# Patient Record
Sex: MALE | Race: WHITE | NOT HISPANIC OR LATINO | Employment: OTHER | URBAN - NONMETROPOLITAN AREA
[De-identification: names, ages, dates, MRNs, and addresses within clinical notes are randomized per-mention and may not be internally consistent; named-entity substitution may affect disease eponyms.]

---

## 2017-01-20 DIAGNOSIS — E11.9 TYPE 2 DIABETES MELLITUS WITHOUT COMPLICATIONS (HCC): ICD-10-CM

## 2017-02-09 ENCOUNTER — OFFICE VISIT (OUTPATIENT)
Dept: FAMILY MEDICINE CLINIC | Facility: CLINIC | Age: 72
End: 2017-02-09

## 2017-02-09 VITALS
OXYGEN SATURATION: 97 % | HEART RATE: 87 BPM | TEMPERATURE: 98.6 F | RESPIRATION RATE: 12 BRPM | WEIGHT: 143 LBS | DIASTOLIC BLOOD PRESSURE: 80 MMHG | HEIGHT: 68 IN | BODY MASS INDEX: 21.67 KG/M2 | SYSTOLIC BLOOD PRESSURE: 160 MMHG

## 2017-02-09 DIAGNOSIS — M54.59 MECHANICAL LOW BACK PAIN: ICD-10-CM

## 2017-02-09 DIAGNOSIS — F17.200 CURRENT SMOKER: ICD-10-CM

## 2017-02-09 DIAGNOSIS — Z00.00 HEALTHCARE MAINTENANCE: ICD-10-CM

## 2017-02-09 DIAGNOSIS — E29.1 ANDROGEN DEFICIENCY: ICD-10-CM

## 2017-02-09 DIAGNOSIS — I10 ESSENTIAL HYPERTENSION: ICD-10-CM

## 2017-02-09 DIAGNOSIS — E78.2 MIXED HYPERLIPIDEMIA: ICD-10-CM

## 2017-02-09 DIAGNOSIS — E78.49 OTHER HYPERLIPIDEMIA: ICD-10-CM

## 2017-02-09 DIAGNOSIS — E11.9 TYPE 2 DIABETES MELLITUS WITHOUT COMPLICATION, WITHOUT LONG-TERM CURRENT USE OF INSULIN (HCC): ICD-10-CM

## 2017-02-09 DIAGNOSIS — I70.0 ATHEROSCLEROSIS OF AORTA (HCC): Primary | ICD-10-CM

## 2017-02-09 DIAGNOSIS — K21.9 GASTROESOPHAGEAL REFLUX DISEASE WITHOUT ESOPHAGITIS: ICD-10-CM

## 2017-02-09 DIAGNOSIS — F41.8 ANXIETY ASSOCIATED WITH DEPRESSION: ICD-10-CM

## 2017-02-09 LAB
ALBUMIN SERPL-MCNC: 4.5 G/DL (ref 3.4–4.8)
ALBUMIN UR-MCNC: 3.5 MG/L
ALBUMIN/GLOB SERPL: 1.8 G/DL (ref 1.5–2.5)
ALP SERPL-CCNC: 57 U/L (ref 46–116)
ALT SERPL W P-5'-P-CCNC: 17 U/L (ref 10–44)
ANION GAP SERPL CALCULATED.3IONS-SCNC: 2.9 MMOL/L (ref 3.6–11.2)
AST SERPL-CCNC: 24 U/L (ref 10–34)
BASOPHILS # BLD AUTO: 0.02 10*3/MM3 (ref 0–0.3)
BASOPHILS NFR BLD AUTO: 0.3 % (ref 0–2)
BILIRUB SERPL-MCNC: 0.5 MG/DL (ref 0.2–1.8)
BUN BLD-MCNC: 11 MG/DL (ref 7–21)
BUN/CREAT SERPL: 14.1 (ref 7–25)
CALCIUM SPEC-SCNC: 9.6 MG/DL (ref 7.7–10)
CHLORIDE SERPL-SCNC: 108 MMOL/L (ref 99–112)
CHOLEST SERPL-MCNC: 146 MG/DL (ref 0–200)
CO2 SERPL-SCNC: 31.1 MMOL/L (ref 24.3–31.9)
CREAT BLD-MCNC: 0.78 MG/DL (ref 0.43–1.29)
DEPRECATED RDW RBC AUTO: 45.4 FL (ref 37–54)
EOSINOPHIL # BLD AUTO: 0.07 10*3/MM3 (ref 0–0.7)
EOSINOPHIL NFR BLD AUTO: 1 % (ref 0–7)
ERYTHROCYTE [DISTWIDTH] IN BLOOD BY AUTOMATED COUNT: 14.2 % (ref 11.5–14.5)
GFR SERPL CREATININE-BSD FRML MDRD: 99 ML/MIN/1.73
GLOBULIN UR ELPH-MCNC: 2.5 GM/DL
GLUCOSE BLD-MCNC: 155 MG/DL (ref 70–110)
HBA1C MFR BLD: 8.3 % (ref 4.5–5.7)
HCT VFR BLD AUTO: 45.2 % (ref 42–52)
HDLC SERPL-MCNC: 61 MG/DL (ref 60–100)
HGB BLD-MCNC: 14.8 G/DL (ref 14–18)
IMM GRANULOCYTES # BLD: 0.01 10*3/MM3 (ref 0–0.03)
IMM GRANULOCYTES NFR BLD: 0.1 % (ref 0–0.5)
LDLC SERPL CALC-MCNC: 73 MG/DL (ref 0–100)
LDLC/HDLC SERPL: 1.2 {RATIO}
LYMPHOCYTES # BLD AUTO: 2.17 10*3/MM3 (ref 1–3)
LYMPHOCYTES NFR BLD AUTO: 29.7 % (ref 16–46)
MCH RBC QN AUTO: 29.1 PG (ref 27–33)
MCHC RBC AUTO-ENTMCNC: 32.7 G/DL (ref 33–37)
MCV RBC AUTO: 88.8 FL (ref 80–94)
MONOCYTES # BLD AUTO: 0.44 10*3/MM3 (ref 0.1–0.9)
MONOCYTES NFR BLD AUTO: 6 % (ref 0–12)
NEUTROPHILS # BLD AUTO: 4.59 10*3/MM3 (ref 1.4–6.5)
NEUTROPHILS NFR BLD AUTO: 62.9 % (ref 40–75)
OSMOLALITY SERPL CALC.SUM OF ELEC: 285.7 MOSM/KG (ref 273–305)
PLATELET # BLD AUTO: 204 10*3/MM3 (ref 130–400)
PMV BLD AUTO: 10.3 FL (ref 6–10)
POTASSIUM BLD-SCNC: 5 MMOL/L (ref 3.5–5.3)
PROT SERPL-MCNC: 7 G/DL (ref 6–8)
RBC # BLD AUTO: 5.09 10*6/MM3 (ref 4.7–6.1)
SODIUM BLD-SCNC: 142 MMOL/L (ref 135–153)
TRIGL SERPL-MCNC: 59 MG/DL (ref 0–150)
VLDLC SERPL-MCNC: 11.8 MG/DL
WBC NRBC COR # BLD: 7.3 10*3/MM3 (ref 4.5–12.5)

## 2017-02-09 PROCEDURE — 85025 COMPLETE CBC W/AUTO DIFF WBC: CPT | Performed by: GENERAL PRACTICE

## 2017-02-09 PROCEDURE — 80061 LIPID PANEL: CPT | Performed by: GENERAL PRACTICE

## 2017-02-09 PROCEDURE — 80053 COMPREHEN METABOLIC PANEL: CPT | Performed by: GENERAL PRACTICE

## 2017-02-09 PROCEDURE — 82043 UR ALBUMIN QUANTITATIVE: CPT | Performed by: GENERAL PRACTICE

## 2017-02-09 PROCEDURE — 99214 OFFICE O/P EST MOD 30 MIN: CPT | Performed by: GENERAL PRACTICE

## 2017-02-09 PROCEDURE — 83036 HEMOGLOBIN GLYCOSYLATED A1C: CPT | Performed by: GENERAL PRACTICE

## 2017-02-09 PROCEDURE — 36415 COLL VENOUS BLD VENIPUNCTURE: CPT | Performed by: GENERAL PRACTICE

## 2017-02-09 RX ORDER — TRAMADOL HYDROCHLORIDE 50 MG/1
50 TABLET ORAL EVERY 6 HOURS PRN
Qty: 360 TABLET | Refills: 1 | Status: SHIPPED | OUTPATIENT
Start: 2017-02-09 | End: 2017-08-14 | Stop reason: SDUPTHER

## 2017-02-09 RX ORDER — LANSOPRAZOLE 30 MG/1
30 CAPSULE, DELAYED RELEASE ORAL DAILY
Qty: 90 CAPSULE | Refills: 3 | Status: SHIPPED | OUTPATIENT
Start: 2017-02-09 | End: 2018-02-12 | Stop reason: SDUPTHER

## 2017-02-09 RX ORDER — SIMVASTATIN 20 MG
20 TABLET ORAL NIGHTLY
Qty: 90 TABLET | Refills: 3 | Status: SHIPPED | OUTPATIENT
Start: 2017-02-09 | End: 2017-09-11 | Stop reason: SDUPTHER

## 2017-02-09 RX ORDER — MELOXICAM 15 MG/1
15 TABLET ORAL DAILY
Qty: 90 TABLET | Refills: 3 | Status: SHIPPED | OUTPATIENT
Start: 2017-02-09 | End: 2017-09-11 | Stop reason: SDUPTHER

## 2017-02-09 RX ORDER — DULOXETIN HYDROCHLORIDE 60 MG/1
120 CAPSULE, DELAYED RELEASE ORAL DAILY
Qty: 180 CAPSULE | Refills: 3 | Status: SHIPPED | OUTPATIENT
Start: 2017-02-09 | End: 2018-08-06 | Stop reason: SDUPTHER

## 2017-02-09 RX ORDER — EZETIMIBE 10 MG/1
10 TABLET ORAL DAILY
Qty: 90 TABLET | Refills: 3 | Status: SHIPPED | OUTPATIENT
Start: 2017-02-09 | End: 2017-09-11 | Stop reason: SDUPTHER

## 2017-02-09 NOTE — PROGRESS NOTES
Subjective   Torin Granados is a 71 y.o. male.     History of Present Illness     Smoking  Since last here been using nicotine gum and has been able to reduce his smoking from 2 packs per day to 2 cigarettes a day!    Diabetes  Current symptoms include none. Patient denies paresthesia of the feet, visual disturbances, polydipsia and polyuria. Evaluation to date has been: fasting blood sugar and hemoglobin A1C. Home sugars: BGs are running  consistent with Hgb A1C. Current treatments: metformin and CLAY - glipizide. Last dilated eye exam within one year.  He has not had any recent lab work     Dyslipidemia  Compliance with treatment has been excellent. The patient exercises daily. He is currently being prescribed the following medication for his dyslipidemia - simvastatin, and ezetimibe. Patient denies side effects associated with his medications.     Hypertension  Home blood pressure readings: not doing. Associated signs and symptoms: none. Patient denies: chest pain, palpitations, dyspnea, orthopnea, paroxysmal nocturnal dyspnea and peripheral edema. Current antihypertensive medications includes lisinopril. Medication compliance: taking as prescribed.     Back Pain  The patient has had chronic back pain. Symptoms have been present for a number of  years and are unchanged.  Onset was related to / precipitated by a remote injury. The pain is located in the bilateral lumbar area and does not radiate. The pain is described as aching and stiffness and occurs intermittently. He is currently in no pain. Symptoms are exacerbated by nothing in particular. Symptoms are improved by gentle exercise/stretches, heat, NSAIDs and tramadol. He has also tried TENS unit, PT treatment and pain management treatment. He has no other symptoms associated with the back pain. He denies any weakness in the right leg, weakness in the left leg, tingling in the right leg, tingling in the left leg, change in bladder function and change in bowel  function    The following portions of the patient's history were reviewed and updated as appropriate: allergies, current medications, past medical history, past social history and problem list.    Review of Systems   Constitutional: Negative for appetite change, chills, fatigue, fever and unexpected weight change.   HENT: Negative for congestion, ear pain, rhinorrhea, sneezing, sore throat and voice change.    Eyes: Negative for visual disturbance.   Respiratory: Negative for cough, shortness of breath and wheezing.    Cardiovascular: Negative for chest pain, palpitations and leg swelling.   Gastrointestinal: Negative for abdominal pain, blood in stool, constipation, diarrhea, nausea and vomiting.   Endocrine: Negative for polydipsia and polyuria.   Genitourinary: Negative for difficulty urinating, dysuria, frequency, hematuria and urgency.   Musculoskeletal: Positive for arthralgias and back pain. Negative for joint swelling, myalgias and neck pain.   Skin: Negative for color change.   Neurological: Negative for tremors, weakness, numbness and headaches.   Psychiatric/Behavioral: Negative for dysphoric mood, sleep disturbance and suicidal ideas. The patient is not nervous/anxious.      Objective   Physical Exam   Constitutional: He is oriented to person, place, and time. He appears well-developed and well-nourished. He is cooperative. He does not have a sickly appearance. No distress.   HENT:   Head: Atraumatic.   Right Ear: Tympanic membrane, external ear and ear canal normal.   Left Ear: Tympanic membrane, external ear and ear canal normal.   Mouth/Throat: Oropharynx is clear and moist.   Eyes: EOM are normal. Pupils are equal, round, and reactive to light. No scleral icterus.   Neck: No JVD present. Carotid bruit is not present. No tracheal deviation present. No thyromegaly present.   Cardiovascular: Normal rate, regular rhythm, S1 normal, S2 normal, normal heart sounds and intact distal pulses.  Exam reveals no  gallop.    No murmur heard.  Pulmonary/Chest: Breath sounds normal. He has no wheezes. He has no rales.   Abdominal: Soft. Normal aorta and bowel sounds are normal. He exhibits no abdominal bruit and no mass. There is no hepatosplenomegaly. There is no tenderness. No hernia.   Musculoskeletal: He exhibits no deformity.        Lumbar back: He exhibits tenderness (mild bilateral lumbar paraspinal muscle tenderness). He exhibits normal range of motion, no bony tenderness, no edema and no deformity.   Negative straight leg raise   Lymphadenopathy:        Head (right side): No submandibular adenopathy present.        Head (left side): No submandibular adenopathy present.     He has no cervical adenopathy.   Neurological: He is alert and oriented to person, place, and time. He has normal strength and normal reflexes. He displays normal reflexes. No cranial nerve deficit. He exhibits normal muscle tone. Coordination normal.   Skin: Skin is warm and dry. No rash noted. He is not diaphoretic. No pallor. Nails show no clubbing.   Psychiatric: He has a normal mood and affect. His behavior is normal.     Assessment/Plan   Problems Addressed this Visit        Cardiovascular and Mediastinum    Atherosclerosis of aorta - Primary    Essential hypertension  Hypertension: elevated today. Evidence of target organ damage: peripheral artery disease.  Encouraged to continue to work on diet and exercise plan.   Continue current medication  Updated lab work drawn   We'll consider modifying his antihypertensive regimen if his blood pressure remains elevated at his return     Relevant Orders    CBC & Differential (Completed)    Comprehensive Metabolic Panel (Completed)    CBC Auto Differential (Completed)    Osmolality, Calculated (Completed)    Mixed hyperlipidemia  As above. Continue current medication.    Relevant Medications    simvastatin (ZOCOR) 20 MG tablet    ezetimibe (ZETIA) 10 MG tablet       Digestive    Gastroesophageal reflux  disease without esophagitis    Relevant Medications    lansoprazole (PREVACID) 30 MG capsule       Endocrine    Type 2 diabetes mellitus without complication, without long-term current use of insulin  Diabetes mellitus Type II, under fair control.   Encouraged to continue to pursue ADA diet  Encouraged aerobic exercise.  Glimepiride will be replaced once more with a trial of Farxiga     Relevant Medications    metFORMIN (GLUCOPHAGE) 1000 MG tablet    dapagliflozin (FARXIGA) 5 MG tablet tablet    Other Relevant Orders    Hemoglobin A1c (Completed)    MicroAlbumin, Urine, Random (Completed)    Androgen deficiency       Nervous and Auditory    Mechanical low back pain  Reminded regarding symptomatic treatment  Will continue current treatment       Relevant Medications    traMADol (ULTRAM) 50 MG tablet    meloxicam (MOBIC) 15 MG tablet       Other    Anxiety associated with depression    Relevant Medications    DULoxetine (CYMBALTA) 60 MG capsule    Current smoker  Doing well.  Encouraged to quit altogether     Healthcare maintenance  Patient was unable to follow through with his low-dose CT of the chest.  This will be rescheduled and he would also like an updated screening colonoscopy arranged     Relevant Orders    Ambulatory Referral to Gastroenterology

## 2017-03-01 ENCOUNTER — HOSPITAL ENCOUNTER (OUTPATIENT)
Dept: CT IMAGING | Facility: HOSPITAL | Age: 72
Discharge: HOME | End: 2017-03-01
Admitting: GENERAL PRACTICE

## 2017-03-01 DIAGNOSIS — Z00.00 HEALTHCARE MAINTENANCE: ICD-10-CM

## 2017-03-01 DIAGNOSIS — Z87.891 PERSONAL HISTORY OF NICOTINE DEPENDENCE: ICD-10-CM

## 2017-03-01 DIAGNOSIS — F17.200 CURRENT SMOKER: ICD-10-CM

## 2017-03-01 PROCEDURE — G0297 LDCT FOR LUNG CA SCREEN: HCPCS

## 2017-03-01 PROCEDURE — 71250 CT THORAX DX C-: CPT | Performed by: RADIOLOGY

## 2017-03-13 ENCOUNTER — CONSULT (OUTPATIENT)
Dept: GASTROENTEROLOGY | Facility: CLINIC | Age: 72
End: 2017-03-13

## 2017-03-13 VITALS
OXYGEN SATURATION: 97 % | BODY MASS INDEX: 21.58 KG/M2 | SYSTOLIC BLOOD PRESSURE: 167 MMHG | DIASTOLIC BLOOD PRESSURE: 87 MMHG | WEIGHT: 142.4 LBS | HEIGHT: 68 IN | HEART RATE: 80 BPM

## 2017-03-13 DIAGNOSIS — Z12.11 ENCOUNTER FOR SCREENING FOR MALIGNANT NEOPLASM OF COLON: Primary | ICD-10-CM

## 2017-03-13 DIAGNOSIS — Z86.010 HISTORY OF COLONIC POLYPS: ICD-10-CM

## 2017-03-13 PROBLEM — Z86.0100 HISTORY OF COLONIC POLYPS: Status: ACTIVE | Noted: 2017-03-13

## 2017-03-13 PROCEDURE — 99202 OFFICE O/P NEW SF 15 MIN: CPT | Performed by: PHYSICIAN ASSISTANT

## 2017-03-13 NOTE — PROGRESS NOTES
Chief Complaint   Patient presents with   • Colon Cancer Screening     history of polyps     Torin Granados is a 71 y.o. male who presents to the office today at the request of Dr. Jan Dudley for Colon Cancer Screening (history of polyps).    HPI  The patient is here to discuss a screening colonoscopy.  Fifteen years ago he had colon polyps removed.  His last colonoscopy was in [place] 8 years ago.  Patient has a history of hemorrhoids.  He denies nausea, vomiting, abdominal pain, constipation, diarrhea, hematochezia, and melena.  Medical, surgical, family, and social histories were reviewed and are listed below.        Review of Systems   Constitutional: Negative.    HENT: Positive for congestion.    Eyes: Negative.    Respiratory: Positive for shortness of breath.    Cardiovascular: Negative.    Gastrointestinal: Negative for abdominal distention, abdominal pain, anal bleeding, blood in stool, constipation, diarrhea, nausea, rectal pain and vomiting.   Endocrine: Negative.    Genitourinary: Negative.    Musculoskeletal: Positive for arthralgias, back pain and myalgias.   Skin: Negative.    Allergic/Immunologic: Positive for environmental allergies.   Neurological: Positive for headaches.   Hematological: Bruises/bleeds easily.   Psychiatric/Behavioral: Positive for sleep disturbance.       ACTIVE PROBLEMS:   Specialty Problems        Gastroenterology Problems    Gastroesophageal reflux disease without esophagitis              PAST MEDICAL HISTORY:  Past Medical History   Diagnosis Date   • Depression    • Hyperthyroidism        SURGICAL HISTORY:  Past Surgical History   Procedure Laterality Date   • Fracture surgery     • Colonoscopy     • Knee surgery         FAMILY HISTORY:  Family History   Problem Relation Age of Onset   • Diabetes Mother    • Heart disease Mother    • Stroke Mother    • Diabetes Father    • Heart disease Father        SOCIAL HISTORY:  Social History   Substance Use Topics   • Smoking  "status: Former Smoker     Packs/day: 1.00     Years: 40.00     Types: Cigarettes     Quit date: 7/1/2016   • Smokeless tobacco: Never Used      Comment: estimated quit date   • Alcohol use No       CURRENT MEDICATION:    Current Outpatient Prescriptions:   •  dapagliflozin (FARXIGA) 5 MG tablet tablet, Take 1 tablet by mouth Every Morning., Disp: 30 tablet, Rfl: 5  •  DULoxetine (CYMBALTA) 60 MG capsule, Take 2 capsules by mouth Daily., Disp: 180 capsule, Rfl: 3  •  ezetimibe (ZETIA) 10 MG tablet, Take 1 tablet by mouth Daily., Disp: 90 tablet, Rfl: 3  •  glimepiride (AMARYL) 4 MG tablet, Take 1 tablet by mouth Every Morning Before Breakfast., Disp: 90 tablet, Rfl: 3  •  lansoprazole (PREVACID) 30 MG capsule, Take 1 capsule by mouth Daily., Disp: 90 capsule, Rfl: 3  •  meloxicam (MOBIC) 15 MG tablet, Take 1 tablet by mouth Daily., Disp: 90 tablet, Rfl: 3  •  metFORMIN (GLUCOPHAGE) 1000 MG tablet, Take 1 tablet by mouth 2 (Two) Times a Day With Meals., Disp: 180 tablet, Rfl: 3  •  simvastatin (ZOCOR) 20 MG tablet, Take 1 tablet by mouth Every Night., Disp: 90 tablet, Rfl: 3  •  Testosterone 20.25 MG/ACT (1.62%) gel, Place 40.5 mg on the skin daily., Disp: 225 g, Rfl: 5  •  traMADol (ULTRAM) 50 MG tablet, Take 1 tablet by mouth Every 6 (Six) Hours As Needed for moderate pain (4-6)., Disp: 360 tablet, Rfl: 1  •  lisinopril (PRINIVIL,ZESTRIL) 10 MG tablet, Take 1 tablet by mouth daily., Disp: 90 tablet, Rfl: 3  •  polyethylene glycol (GoLYTELY) 236 G solution, Starting at 6 pm on day prior to procedure, drink 8 ounces every 15 minutes until all gone or stools are clear. May add flavor packet., Disp: 4000 mL, Rfl: 0    ALLERGIES:  Review of patient's allergies indicates no known allergies.    VISIT VITALS:  Visit Vitals   • /87   • Pulse 80   • Ht 68\" (172.7 cm)   • Wt 142 lb 6.4 oz (64.6 kg)   • SpO2 97%   • BMI 21.65 kg/m2       PHYSICAL EXAMINATION:  Physical Exam   Constitutional: He is oriented to person, " place, and time. He appears well-developed and well-nourished. No distress.   HENT:   Head: Normocephalic and atraumatic.   Right Ear: External ear normal.   Left Ear: External ear normal.   Nose: Nose normal.   Mouth/Throat: Oropharynx is clear and moist. No oropharyngeal exudate.   Eyes: Conjunctivae and EOM are normal. Pupils are equal, round, and reactive to light. Right eye exhibits no discharge. Left eye exhibits no discharge. No scleral icterus.   Neck: Normal range of motion. Neck supple. No JVD present. No tracheal deviation present. No thyromegaly present.   Cardiovascular: Normal rate, regular rhythm, normal heart sounds and intact distal pulses.  Exam reveals no gallop and no friction rub.    No murmur heard.  Pulmonary/Chest: Effort normal and breath sounds normal. No stridor. No respiratory distress. He has no wheezes. He has no rales. He exhibits no tenderness.   Abdominal: Soft. Bowel sounds are normal. He exhibits no distension and no mass. There is no tenderness. There is no rebound and no guarding. No hernia.   Musculoskeletal: He exhibits no edema, tenderness or deformity.   Lymphadenopathy:     He has no cervical adenopathy.   Neurological: He is alert and oriented to person, place, and time. He has normal reflexes. He displays normal reflexes. No cranial nerve deficit. He exhibits normal muscle tone. Coordination normal.   Skin: Skin is warm and dry. No rash noted. He is not diaphoretic. No erythema. No pallor.   Psychiatric: He has a normal mood and affect. His behavior is normal. Judgment and thought content normal.       Assessment/Plan      Diagnosis Plan   1. Encounter for screening for malignant neoplasm of colon  Case request   2. History of colonic polyps  Case request     It is recommended that the patient have a screening colonoscopy due to history of colonic polyps.  Patient voiced understanding and agreement of recommendations.  Return if symptoms worsen or fail to improve.            MICHAEL Blair

## 2017-03-29 ENCOUNTER — HOSPITAL ENCOUNTER (OUTPATIENT)
Facility: HOSPITAL | Age: 72
Setting detail: HOSPITAL OUTPATIENT SURGERY
Discharge: HOME | End: 2017-03-29
Attending: INTERNAL MEDICINE | Admitting: INTERNAL MEDICINE

## 2017-03-29 ENCOUNTER — ANESTHESIA EVENT (OUTPATIENT)
Dept: PERIOP | Facility: HOSPITAL | Age: 72
End: 2017-03-29

## 2017-03-29 ENCOUNTER — ANESTHESIA (OUTPATIENT)
Dept: PERIOP | Facility: HOSPITAL | Age: 72
End: 2017-03-29

## 2017-03-29 VITALS
WEIGHT: 140 LBS | OXYGEN SATURATION: 96 % | HEART RATE: 78 BPM | TEMPERATURE: 98.2 F | HEIGHT: 68 IN | SYSTOLIC BLOOD PRESSURE: 134 MMHG | RESPIRATION RATE: 20 BRPM | BODY MASS INDEX: 21.22 KG/M2 | DIASTOLIC BLOOD PRESSURE: 74 MMHG

## 2017-03-29 LAB — GLUCOSE BLDC GLUCOMTR-MCNC: 107 MG/DL (ref 70–130)

## 2017-03-29 PROCEDURE — 25010000002 PROPOFOL 1000 MG/ML EMULSION: Performed by: NURSE ANESTHETIST, CERTIFIED REGISTERED

## 2017-03-29 PROCEDURE — 25010000002 PROPOFOL 10 MG/ML EMULSION: Performed by: NURSE ANESTHETIST, CERTIFIED REGISTERED

## 2017-03-29 PROCEDURE — 25010000002 MIDAZOLAM PER 1 MG: Performed by: NURSE ANESTHETIST, CERTIFIED REGISTERED

## 2017-03-29 PROCEDURE — G0121 COLON CA SCRN NOT HI RSK IND: HCPCS | Performed by: INTERNAL MEDICINE

## 2017-03-29 PROCEDURE — 25010000002 FENTANYL CITRATE (PF) 100 MCG/2ML SOLUTION: Performed by: NURSE ANESTHETIST, CERTIFIED REGISTERED

## 2017-03-29 RX ORDER — SODIUM CHLORIDE, SODIUM LACTATE, POTASSIUM CHLORIDE, CALCIUM CHLORIDE 600; 310; 30; 20 MG/100ML; MG/100ML; MG/100ML; MG/100ML
125 INJECTION, SOLUTION INTRAVENOUS CONTINUOUS
Status: DISCONTINUED | OUTPATIENT
Start: 2017-03-29 | End: 2017-03-29 | Stop reason: HOSPADM

## 2017-03-29 RX ORDER — LIDOCAINE HYDROCHLORIDE 20 MG/ML
INJECTION, SOLUTION INFILTRATION; PERINEURAL AS NEEDED
Status: DISCONTINUED | OUTPATIENT
Start: 2017-03-29 | End: 2017-03-29 | Stop reason: SURG

## 2017-03-29 RX ORDER — OXYCODONE HYDROCHLORIDE AND ACETAMINOPHEN 5; 325 MG/1; MG/1
1 TABLET ORAL ONCE AS NEEDED
Status: DISCONTINUED | OUTPATIENT
Start: 2017-03-29 | End: 2017-03-29 | Stop reason: HOSPADM

## 2017-03-29 RX ORDER — MEPERIDINE HYDROCHLORIDE 25 MG/ML
12.5 INJECTION INTRAMUSCULAR; INTRAVENOUS; SUBCUTANEOUS
Status: DISCONTINUED | OUTPATIENT
Start: 2017-03-29 | End: 2017-03-29 | Stop reason: HOSPADM

## 2017-03-29 RX ORDER — IPRATROPIUM BROMIDE AND ALBUTEROL SULFATE 2.5; .5 MG/3ML; MG/3ML
3 SOLUTION RESPIRATORY (INHALATION) ONCE AS NEEDED
Status: DISCONTINUED | OUTPATIENT
Start: 2017-03-29 | End: 2017-03-29 | Stop reason: HOSPADM

## 2017-03-29 RX ORDER — SODIUM CHLORIDE 0.9 % (FLUSH) 0.9 %
1-10 SYRINGE (ML) INJECTION AS NEEDED
Status: DISCONTINUED | OUTPATIENT
Start: 2017-03-29 | End: 2017-03-29 | Stop reason: HOSPADM

## 2017-03-29 RX ORDER — MIDAZOLAM HYDROCHLORIDE 1 MG/ML
INJECTION INTRAMUSCULAR; INTRAVENOUS AS NEEDED
Status: DISCONTINUED | OUTPATIENT
Start: 2017-03-29 | End: 2017-03-29 | Stop reason: SURG

## 2017-03-29 RX ORDER — PROPOFOL 10 MG/ML
VIAL (ML) INTRAVENOUS AS NEEDED
Status: DISCONTINUED | OUTPATIENT
Start: 2017-03-29 | End: 2017-03-29 | Stop reason: SURG

## 2017-03-29 RX ORDER — FENTANYL CITRATE 50 UG/ML
50 INJECTION, SOLUTION INTRAMUSCULAR; INTRAVENOUS
Status: DISCONTINUED | OUTPATIENT
Start: 2017-03-29 | End: 2017-03-29 | Stop reason: HOSPADM

## 2017-03-29 RX ORDER — FENTANYL CITRATE 50 UG/ML
INJECTION, SOLUTION INTRAMUSCULAR; INTRAVENOUS AS NEEDED
Status: DISCONTINUED | OUTPATIENT
Start: 2017-03-29 | End: 2017-03-29 | Stop reason: SURG

## 2017-03-29 RX ORDER — ONDANSETRON 2 MG/ML
4 INJECTION INTRAMUSCULAR; INTRAVENOUS ONCE AS NEEDED
Status: DISCONTINUED | OUTPATIENT
Start: 2017-03-29 | End: 2017-03-29 | Stop reason: HOSPADM

## 2017-03-29 RX ADMIN — FENTANYL CITRATE 100 MCG: 50 INJECTION INTRAMUSCULAR; INTRAVENOUS at 08:32

## 2017-03-29 RX ADMIN — SODIUM CHLORIDE, POTASSIUM CHLORIDE, SODIUM LACTATE AND CALCIUM CHLORIDE: 600; 310; 30; 20 INJECTION, SOLUTION INTRAVENOUS at 08:32

## 2017-03-29 RX ADMIN — PROPOFOL 30 MG: 10 INJECTION, EMULSION INTRAVENOUS at 08:35

## 2017-03-29 RX ADMIN — PROPOFOL 150 MCG/KG/MIN: 10 INJECTION, EMULSION INTRAVENOUS at 08:35

## 2017-03-29 RX ADMIN — LIDOCAINE HYDROCHLORIDE 60 MG: 20 INJECTION, SOLUTION INFILTRATION; PERINEURAL at 08:35

## 2017-03-29 RX ADMIN — MIDAZOLAM HYDROCHLORIDE 2 MG: 1 INJECTION, SOLUTION INTRAMUSCULAR; INTRAVENOUS at 08:32

## 2017-03-29 NOTE — PLAN OF CARE
Problem: GI Endoscopy (Adult)  Goal: Signs and Symptoms of Listed Potential Problems Will be Absent or Manageable (GI Endoscopy)  Outcome: Ongoing (interventions implemented as appropriate)    03/29/17 0790   GI Endoscopy   Problems Assessed (GI Endoscopy) all   Problems Present (GI Endoscopy) none

## 2017-03-29 NOTE — ANESTHESIA POSTPROCEDURE EVALUATION
Patient: Torin Granados    Procedure Summary     Date Anesthesia Start Anesthesia Stop Room / Location    03/29/17 0832 0851 [facility] / [facility]       Procedure Diagnosis Surgeon Provider    Colonoscopy  CPTCODE: 12775 (N/A ) History of colonic polyps; Encounter for screening for malignant neoplasm of colon  (History of colonic polyps [Z86.010]; Encounter for screening for malignant neoplasm of colon [Z12.11]) MD Sammy Dickson III, MD          Anesthesia Type: general  Last vitals  BP      Temp 98.2 °F (36.8 °C) (03/29/17 0853)    Pulse 82 (03/29/17 0853)   Resp 20 (03/29/17 0853)    SpO2 95 % (03/29/17 0853)      Post Anesthesia Care and Evaluation    Patient location during evaluation: bedside  Patient participation: complete - patient participated  Level of consciousness: awake and alert  Pain score: 1  Pain management: adequate  Airway patency: patent  Anesthetic complications: No anesthetic complications  PONV Status: none  Cardiovascular status: acceptable  Respiratory status: acceptable  Hydration status: acceptable

## 2017-03-29 NOTE — PLAN OF CARE
Problem: GI Endoscopy (Adult)  Goal: Signs and Symptoms of Listed Potential Problems Will be Absent or Manageable (GI Endoscopy)  Outcome: Ongoing (interventions implemented as appropriate)    03/29/17 0841   GI Endoscopy   Problems Assessed (GI Endoscopy) all   Problems Present (GI Endoscopy) none

## 2017-03-29 NOTE — PLAN OF CARE
Problem: Patient Care Overview (Adult)  Goal: Plan of Care Review  Outcome: Ongoing (interventions implemented as appropriate)    03/29/17 4504   Coping/Psychosocial Response Interventions   Plan Of Care Reviewed With patient   Patient Care Overview   Progress progress toward functional goals as expected

## 2017-03-29 NOTE — ANESTHESIA PREPROCEDURE EVALUATION
Anesthesia Evaluation     NPO Status: > 8 hours   Airway   Mallampati: II  TM distance: >3 FB  Neck ROM: full  no difficulty expected  Dental    (+) poor dentition    Pulmonary - normal exam   Cardiovascular     (+) hypertension,       Neuro/Psych  GI/Hepatic/Renal/Endo    (+)  GERD, diabetes mellitus, hyperthyroidism    Musculoskeletal     Abdominal  - normal exam   Substance History      OB/GYN          Other                                    Anesthesia Plan    ASA 3     general     intravenous induction   Anesthetic plan and risks discussed with patient.

## 2017-03-29 NOTE — H&P (VIEW-ONLY)
Chief Complaint   Patient presents with   • Colon Cancer Screening     history of polyps     Torin Granados is a 71 y.o. male who presents to the office today at the request of Dr. Jan Dudley for Colon Cancer Screening (history of polyps).    HPI  The patient is here to discuss a screening colonoscopy.  Fifteen years ago he had colon polyps removed.  His last colonoscopy was in [place] 8 years ago.  Patient has a history of hemorrhoids.  He denies nausea, vomiting, abdominal pain, constipation, diarrhea, hematochezia, and melena.  Medical, surgical, family, and social histories were reviewed and are listed below.        Review of Systems   Constitutional: Negative.    HENT: Positive for congestion.    Eyes: Negative.    Respiratory: Positive for shortness of breath.    Cardiovascular: Negative.    Gastrointestinal: Negative for abdominal distention, abdominal pain, anal bleeding, blood in stool, constipation, diarrhea, nausea, rectal pain and vomiting.   Endocrine: Negative.    Genitourinary: Negative.    Musculoskeletal: Positive for arthralgias, back pain and myalgias.   Skin: Negative.    Allergic/Immunologic: Positive for environmental allergies.   Neurological: Positive for headaches.   Hematological: Bruises/bleeds easily.   Psychiatric/Behavioral: Positive for sleep disturbance.       ACTIVE PROBLEMS:   Specialty Problems        Gastroenterology Problems    Gastroesophageal reflux disease without esophagitis              PAST MEDICAL HISTORY:  Past Medical History   Diagnosis Date   • Depression    • Hyperthyroidism        SURGICAL HISTORY:  Past Surgical History   Procedure Laterality Date   • Fracture surgery     • Colonoscopy     • Knee surgery         FAMILY HISTORY:  Family History   Problem Relation Age of Onset   • Diabetes Mother    • Heart disease Mother    • Stroke Mother    • Diabetes Father    • Heart disease Father        SOCIAL HISTORY:  Social History   Substance Use Topics   • Smoking  "status: Former Smoker     Packs/day: 1.00     Years: 40.00     Types: Cigarettes     Quit date: 7/1/2016   • Smokeless tobacco: Never Used      Comment: estimated quit date   • Alcohol use No       CURRENT MEDICATION:    Current Outpatient Prescriptions:   •  dapagliflozin (FARXIGA) 5 MG tablet tablet, Take 1 tablet by mouth Every Morning., Disp: 30 tablet, Rfl: 5  •  DULoxetine (CYMBALTA) 60 MG capsule, Take 2 capsules by mouth Daily., Disp: 180 capsule, Rfl: 3  •  ezetimibe (ZETIA) 10 MG tablet, Take 1 tablet by mouth Daily., Disp: 90 tablet, Rfl: 3  •  glimepiride (AMARYL) 4 MG tablet, Take 1 tablet by mouth Every Morning Before Breakfast., Disp: 90 tablet, Rfl: 3  •  lansoprazole (PREVACID) 30 MG capsule, Take 1 capsule by mouth Daily., Disp: 90 capsule, Rfl: 3  •  meloxicam (MOBIC) 15 MG tablet, Take 1 tablet by mouth Daily., Disp: 90 tablet, Rfl: 3  •  metFORMIN (GLUCOPHAGE) 1000 MG tablet, Take 1 tablet by mouth 2 (Two) Times a Day With Meals., Disp: 180 tablet, Rfl: 3  •  simvastatin (ZOCOR) 20 MG tablet, Take 1 tablet by mouth Every Night., Disp: 90 tablet, Rfl: 3  •  Testosterone 20.25 MG/ACT (1.62%) gel, Place 40.5 mg on the skin daily., Disp: 225 g, Rfl: 5  •  traMADol (ULTRAM) 50 MG tablet, Take 1 tablet by mouth Every 6 (Six) Hours As Needed for moderate pain (4-6)., Disp: 360 tablet, Rfl: 1  •  lisinopril (PRINIVIL,ZESTRIL) 10 MG tablet, Take 1 tablet by mouth daily., Disp: 90 tablet, Rfl: 3  •  polyethylene glycol (GoLYTELY) 236 G solution, Starting at 6 pm on day prior to procedure, drink 8 ounces every 15 minutes until all gone or stools are clear. May add flavor packet., Disp: 4000 mL, Rfl: 0    ALLERGIES:  Review of patient's allergies indicates no known allergies.    VISIT VITALS:  Visit Vitals   • /87   • Pulse 80   • Ht 68\" (172.7 cm)   • Wt 142 lb 6.4 oz (64.6 kg)   • SpO2 97%   • BMI 21.65 kg/m2       PHYSICAL EXAMINATION:  Physical Exam   Constitutional: He is oriented to person, " place, and time. He appears well-developed and well-nourished. No distress.   HENT:   Head: Normocephalic and atraumatic.   Right Ear: External ear normal.   Left Ear: External ear normal.   Nose: Nose normal.   Mouth/Throat: Oropharynx is clear and moist. No oropharyngeal exudate.   Eyes: Conjunctivae and EOM are normal. Pupils are equal, round, and reactive to light. Right eye exhibits no discharge. Left eye exhibits no discharge. No scleral icterus.   Neck: Normal range of motion. Neck supple. No JVD present. No tracheal deviation present. No thyromegaly present.   Cardiovascular: Normal rate, regular rhythm, normal heart sounds and intact distal pulses.  Exam reveals no gallop and no friction rub.    No murmur heard.  Pulmonary/Chest: Effort normal and breath sounds normal. No stridor. No respiratory distress. He has no wheezes. He has no rales. He exhibits no tenderness.   Abdominal: Soft. Bowel sounds are normal. He exhibits no distension and no mass. There is no tenderness. There is no rebound and no guarding. No hernia.   Musculoskeletal: He exhibits no edema, tenderness or deformity.   Lymphadenopathy:     He has no cervical adenopathy.   Neurological: He is alert and oriented to person, place, and time. He has normal reflexes. He displays normal reflexes. No cranial nerve deficit. He exhibits normal muscle tone. Coordination normal.   Skin: Skin is warm and dry. No rash noted. He is not diaphoretic. No erythema. No pallor.   Psychiatric: He has a normal mood and affect. His behavior is normal. Judgment and thought content normal.       Assessment/Plan      Diagnosis Plan   1. Encounter for screening for malignant neoplasm of colon  Case request   2. History of colonic polyps  Case request     It is recommended that the patient have a screening colonoscopy due to history of colonic polyps.  Patient voiced understanding and agreement of recommendations.  Return if symptoms worsen or fail to improve.            MICHAEL Blair

## 2017-03-29 NOTE — OP NOTE
03/29/17    COLONOSCOPY  Procedure Note    Torin Granados  3/29/2017    Pre-op Diagnosis: History of colon polyps, colon cancer screening, last colonoscopy was performed about 8 years ago      Post-op Diagnosis: Diverticulosis, sigmoid        Anesthesia: Per Anesthesia service, general anesthesia      Estimated Blood Loss: None      Findings: Rectal examination revealed no mass.  The prostate was somewhat prominent.  Colonoscopy was performed to the cecum, confirmed by identification of typical cecal anatomy.  Bowel preparation was adequate.  Scope was retroflexed within the rectum.  All areas were examined carefully.  Moderate diverticulosis was found in the sigmoid colon without obvious evidence of diverticulitis.  No other abnormality was seen.  The examination well and there were no immediate complications.  He left the OR in stable and satisfactory condition.      Complications: None      Recommendations:   Findings were discussed with the patient  Repeat screening/surveillance colonoscopy in about 5 years      Rudy Velez III, MD     Date: 3/29/2017  Time: 8:51 AM     CC:  Dr. Jan Dudley

## 2017-06-08 ENCOUNTER — OFFICE VISIT (OUTPATIENT)
Dept: FAMILY MEDICINE CLINIC | Facility: CLINIC | Age: 72
End: 2017-06-08

## 2017-06-08 VITALS
WEIGHT: 140 LBS | HEART RATE: 87 BPM | OXYGEN SATURATION: 95 % | DIASTOLIC BLOOD PRESSURE: 80 MMHG | HEIGHT: 68 IN | BODY MASS INDEX: 21.22 KG/M2 | SYSTOLIC BLOOD PRESSURE: 125 MMHG | RESPIRATION RATE: 12 BRPM | TEMPERATURE: 98.8 F

## 2017-06-08 DIAGNOSIS — I70.0 ATHEROSCLEROSIS OF AORTA (HCC): Primary | ICD-10-CM

## 2017-06-08 DIAGNOSIS — F17.200 CURRENT SMOKER: ICD-10-CM

## 2017-06-08 DIAGNOSIS — I25.10 CORONARY ARTERY CALCIFICATION SEEN ON CT SCAN: ICD-10-CM

## 2017-06-08 DIAGNOSIS — E78.2 MIXED HYPERLIPIDEMIA: ICD-10-CM

## 2017-06-08 DIAGNOSIS — F41.8 ANXIETY ASSOCIATED WITH DEPRESSION: ICD-10-CM

## 2017-06-08 DIAGNOSIS — I10 ESSENTIAL HYPERTENSION: ICD-10-CM

## 2017-06-08 DIAGNOSIS — E29.1 ANDROGEN DEFICIENCY: ICD-10-CM

## 2017-06-08 DIAGNOSIS — K21.9 GASTROESOPHAGEAL REFLUX DISEASE WITHOUT ESOPHAGITIS: ICD-10-CM

## 2017-06-08 DIAGNOSIS — M54.59 MECHANICAL LOW BACK PAIN: ICD-10-CM

## 2017-06-08 DIAGNOSIS — Z00.00 HEALTHCARE MAINTENANCE: ICD-10-CM

## 2017-06-08 DIAGNOSIS — Z86.010 HISTORY OF COLONIC POLYPS: ICD-10-CM

## 2017-06-08 DIAGNOSIS — E11.9 TYPE 2 DIABETES MELLITUS WITHOUT COMPLICATION, WITHOUT LONG-TERM CURRENT USE OF INSULIN (HCC): ICD-10-CM

## 2017-06-08 PROBLEM — Z12.11 ENCOUNTER FOR SCREENING FOR MALIGNANT NEOPLASM OF COLON: Status: RESOLVED | Noted: 2017-03-13 | Resolved: 2017-06-08

## 2017-06-08 PROCEDURE — 99214 OFFICE O/P EST MOD 30 MIN: CPT | Performed by: GENERAL PRACTICE

## 2017-06-08 RX ORDER — BLOOD SUGAR DIAGNOSTIC
STRIP MISCELLANEOUS
Refills: 5 | COMMUNITY
Start: 2017-05-19 | End: 2020-01-31

## 2017-06-08 RX ORDER — BLOOD-GLUCOSE METER
EACH MISCELLANEOUS
Refills: 0 | COMMUNITY
Start: 2017-03-08 | End: 2020-01-31

## 2017-06-08 RX ORDER — LANCETS
EACH MISCELLANEOUS
Refills: 3 | COMMUNITY
Start: 2017-05-19 | End: 2020-01-31

## 2017-06-08 RX ORDER — TESTOSTERONE 16.2 MG/G
40.5 GEL TRANSDERMAL DAILY
Qty: 225 G | Refills: 5 | Status: SHIPPED | OUTPATIENT
Start: 2017-06-08 | End: 2018-01-03 | Stop reason: SDUPTHER

## 2017-06-08 RX ORDER — TESTOSTERONE 16.2 MG/G
GEL TRANSDERMAL
Qty: 225 G | Refills: 1 | Status: CANCELLED | OUTPATIENT
Start: 2017-06-08

## 2017-06-08 NOTE — PROGRESS NOTES
Subjective   Torin Granados is a 71 y.o. male.     History of Present Illness     Diabetes  Current symptoms include none. Patient denies paresthesia of the feet, visual disturbances, polydipsia, polyuria, hypoglycemia and foot ulcerations. Evaluation to date has been: hemoglobin A1C. Home sugars: have generally been in the mid 100s fasting. Current treatments: metformin and CLAY - glipizide.  He took farxiga for one month until running out of samples with no apparent side effects but resumed glipizide afterward as no prescription was apparently available at his pharmacy.  Last dilated eye exam within one year. Most recent hemoglobin A1c   Lab Results   Component Value Date    HGBA1C 8.30 (H) 02/09/2017    HGBA1C 8.10 (H) 07/28/2016    HGBA1C 7.2 (H) 01/10/2014      Dyslipidemia  Compliance with treatment has been excellent. The patient exercises daily. He is currently being prescribed the following medication for his dyslipidemia - simvastatin, ezetimibe. Patient denies side effects associated with his medications. Most recent lipids include  Lab Results   Component Value Date    TRIG 59 02/09/2017    TRIG 67 01/10/2014    HDL 61 02/09/2017    HDL 53 (L) 01/10/2014    LDLCALC 73 02/09/2017    LDL 66 01/10/2014     Hypertension  Home blood pressure readings: not doing. Associated signs and symptoms: none. Patient denies: chest pain, palpitations, dyspnea, orthopnea, paroxysmal nocturnal dyspnea and peripheral edema. Current antihypertensive medications includes lisinopril. Medication compliance: taking as prescribed. Most recent creatinine   Lab Results   Component Value Date    CREATININE 0.78 02/09/2017     Back Pain  The patient has had chronic back pain. Symptoms have been present for a number of  years and are unchanged.  Onset was related to / precipitated by a remote injury. The pain is located in the bilateral lumbar area and does not radiate. The pain is described as aching and stiffness and occurs  intermittently. He is currently in no pain. Symptoms are exacerbated by nothing in particular. Symptoms are improved by gentle exercise/stretches, heat, NSAIDs and tramadol. He has also tried TENS unit, PT treatment and pain management treatment. He has no other symptoms associated with the back pain. He denies any weakness in the right leg, weakness in the left leg, tingling in the right leg, tingling in the left leg, change in bladder function and change in bowel function    Imaging  Low dose CT of the chest performed on 3/1/17 was reported as showing moderate COPD, mild coronary artery calcifications, and cholelithiasis    The following portions of the patient's history were reviewed and updated as appropriate: allergies, current medications, past medical history, past social history and problem list.    Review of Systems   Constitutional: Negative for appetite change, chills, fatigue, fever and unexpected weight change.   HENT: Negative for congestion, ear pain, rhinorrhea, sneezing, sore throat and voice change.    Eyes: Negative for visual disturbance.   Respiratory: Negative for cough, shortness of breath and wheezing.    Cardiovascular: Negative for chest pain, palpitations and leg swelling.   Gastrointestinal: Negative for abdominal pain, blood in stool, constipation, diarrhea, nausea and vomiting.   Endocrine: Negative for polydipsia and polyuria.   Genitourinary: Negative for difficulty urinating, dysuria, frequency, hematuria and urgency.   Musculoskeletal: Positive for arthralgias and back pain. Negative for joint swelling, myalgias and neck pain.   Skin: Negative for color change.   Neurological: Negative for tremors, weakness, numbness and headaches.   Psychiatric/Behavioral: Negative for dysphoric mood, sleep disturbance and suicidal ideas. The patient is not nervous/anxious.      Objective   Physical Exam   Constitutional: He is oriented to person, place, and time. He appears well-developed and  well-nourished. He is cooperative. He does not have a sickly appearance. No distress.   Bright and in good spirits. No apparent distress. No pallor, jaundice, diaphoresis, or cyanosis.   HENT:   Head: Atraumatic.   Right Ear: Tympanic membrane, external ear and ear canal normal.   Left Ear: Tympanic membrane, external ear and ear canal normal.   Mouth/Throat: Oropharynx is clear and moist.   Eyes: EOM are normal. Pupils are equal, round, and reactive to light. No scleral icterus.   Neck: No JVD present. Carotid bruit is not present. No tracheal deviation present. No thyromegaly present.   Cardiovascular: Normal rate, regular rhythm, S1 normal, S2 normal, normal heart sounds and intact distal pulses.  Exam reveals no gallop.    No murmur heard.  Pulmonary/Chest: Breath sounds normal. He has no wheezes. He has no rales.   Abdominal: Soft. Normal aorta and bowel sounds are normal. He exhibits no abdominal bruit and no mass. There is no hepatosplenomegaly. There is no tenderness. No hernia.   Musculoskeletal: He exhibits no deformity.   Lymphadenopathy:        Head (right side): No submandibular adenopathy present.        Head (left side): No submandibular adenopathy present.     He has no cervical adenopathy.   Neurological: He is alert and oriented to person, place, and time. He has normal strength and normal reflexes. He displays normal reflexes. No cranial nerve deficit. He exhibits normal muscle tone. Coordination normal.   Skin: Skin is warm and dry. No rash noted. He is not diaphoretic. No pallor. Nails show no clubbing.   Psychiatric: He has a normal mood and affect. His behavior is normal.     Assessment/Plan   Problems Addressed this Visit        Cardiovascular and Mediastinum    Atherosclerosis of aorta   Reminded of the importance of risk factor modification.  Encouraged remain off tobacco    Essential hypertension  Hypertension: at goal. Evidence of target organ damage: atherosclerosis of the aorta and  coronary artery calcifications on imaging.  Reminded of the importance of salt avoidance.  Continue current medication    Mixed hyperlipidemia  As above. Continue current medication.    Coronary artery calcification seen on CT scan       Digestive    Gastroesophageal reflux disease without esophagitis       Endocrine    Type 2 diabetes mellitus without complication, without long-term current use of insulin  Diabetes mellitus Type II, under fair control.   Encouraged to continue to pursue ADA diet  Encouraged aerobic exercise.  Farxiga will be resumed in place of glipizide   Updated labs will be drawn at his return in 3 months    Relevant Medications    dapagliflozin (FARXIGA) 5 MG tablet tablet    Androgen deficiency    Relevant Medications    Testosterone 20.25 MG/ACT (1.62%) gel       Nervous and Auditory    Mechanical low back pain       Other    Anxiety associated with depression    Former smoker  Encouraged remain off tobacco    Healthcare maintenance  Reminded to get a flu shot when available. Hepatitis C screen and a DEXA scan will be discussed at his return     History of colonic polyps  Recent colonoscopy revealed diverticulosis but was otherwise normal.  He was advised to have his next study in 5 years

## 2017-08-14 DIAGNOSIS — E11.9 TYPE 2 DIABETES MELLITUS WITHOUT COMPLICATION, WITHOUT LONG-TERM CURRENT USE OF INSULIN (HCC): ICD-10-CM

## 2017-08-14 DIAGNOSIS — M54.59 MECHANICAL LOW BACK PAIN: ICD-10-CM

## 2017-08-14 RX ORDER — TRAMADOL HYDROCHLORIDE 50 MG/1
50 TABLET ORAL EVERY 6 HOURS PRN
Qty: 360 TABLET | Refills: 1 | Status: SHIPPED | OUTPATIENT
Start: 2017-08-14 | End: 2018-04-05 | Stop reason: SDUPTHER

## 2017-08-21 ENCOUNTER — TELEPHONE (OUTPATIENT)
Dept: FAMILY MEDICINE CLINIC | Facility: CLINIC | Age: 72
End: 2017-08-21

## 2017-08-21 NOTE — TELEPHONE ENCOUNTER
----- Message from Jan Dudley MD sent at 8/19/2017  9:31 AM EDT -----  Regarding: RE: Pt called with reaction to Farxiga  Patient can stop farxiga  Mariam emailed a script to his pharm for trulicity shots once weekly  If he needs to be walked through his first injection or if he has any difficulty getting it though his insurance he should let us know      ----- Message -----     From: Jan Hicks MA     Sent: 8/17/2017  10:43 AM       To: Jan Dudley MD  Subject: Pt called with reaction to Farxiga               Pt is wanting to know if you could change the Farxiga over to another med. He said he had yeast infection really bad. Please advise.  Thanks  Jan ALSTON

## 2017-09-11 ENCOUNTER — OFFICE VISIT (OUTPATIENT)
Dept: FAMILY MEDICINE CLINIC | Facility: CLINIC | Age: 72
End: 2017-09-11

## 2017-09-11 VITALS
BODY MASS INDEX: 20.31 KG/M2 | WEIGHT: 134 LBS | OXYGEN SATURATION: 98 % | SYSTOLIC BLOOD PRESSURE: 125 MMHG | HEART RATE: 81 BPM | TEMPERATURE: 97.6 F | DIASTOLIC BLOOD PRESSURE: 80 MMHG | HEIGHT: 68 IN | RESPIRATION RATE: 12 BRPM

## 2017-09-11 DIAGNOSIS — I70.0 ATHEROSCLEROSIS OF AORTA (HCC): Primary | ICD-10-CM

## 2017-09-11 DIAGNOSIS — I10 ESSENTIAL HYPERTENSION: ICD-10-CM

## 2017-09-11 DIAGNOSIS — M54.59 MECHANICAL LOW BACK PAIN: ICD-10-CM

## 2017-09-11 DIAGNOSIS — K21.9 GASTROESOPHAGEAL REFLUX DISEASE WITHOUT ESOPHAGITIS: ICD-10-CM

## 2017-09-11 DIAGNOSIS — I25.10 CORONARY ARTERY CALCIFICATION SEEN ON CT SCAN: ICD-10-CM

## 2017-09-11 DIAGNOSIS — Z00.00 HEALTHCARE MAINTENANCE: ICD-10-CM

## 2017-09-11 DIAGNOSIS — F41.8 ANXIETY ASSOCIATED WITH DEPRESSION: ICD-10-CM

## 2017-09-11 DIAGNOSIS — E78.49 OTHER HYPERLIPIDEMIA: ICD-10-CM

## 2017-09-11 DIAGNOSIS — E78.2 MIXED HYPERLIPIDEMIA: ICD-10-CM

## 2017-09-11 DIAGNOSIS — F17.200 CURRENT SMOKER: ICD-10-CM

## 2017-09-11 DIAGNOSIS — E11.9 TYPE 2 DIABETES MELLITUS WITHOUT COMPLICATION, WITHOUT LONG-TERM CURRENT USE OF INSULIN (HCC): ICD-10-CM

## 2017-09-11 DIAGNOSIS — F43.21 GRIEF REACTION: ICD-10-CM

## 2017-09-11 PROCEDURE — 99214 OFFICE O/P EST MOD 30 MIN: CPT | Performed by: GENERAL PRACTICE

## 2017-09-11 RX ORDER — MELOXICAM 15 MG/1
15 TABLET ORAL DAILY
Qty: 90 TABLET | Refills: 3 | Status: SHIPPED | OUTPATIENT
Start: 2017-09-11 | End: 2018-11-26 | Stop reason: SDUPTHER

## 2017-09-11 RX ORDER — EZETIMIBE 10 MG/1
10 TABLET ORAL DAILY
Qty: 90 TABLET | Refills: 3 | Status: SHIPPED | OUTPATIENT
Start: 2017-09-11 | End: 2018-10-18 | Stop reason: SDUPTHER

## 2017-09-11 RX ORDER — SIMVASTATIN 20 MG
20 TABLET ORAL NIGHTLY
Qty: 90 TABLET | Refills: 3 | Status: SHIPPED | OUTPATIENT
Start: 2017-09-11 | End: 2018-09-24 | Stop reason: SDUPTHER

## 2017-09-11 RX ORDER — LISINOPRIL 10 MG/1
10 TABLET ORAL DAILY
Qty: 90 TABLET | Refills: 3 | Status: SHIPPED | OUTPATIENT
Start: 2017-09-11 | End: 2018-10-10 | Stop reason: SDUPTHER

## 2017-09-11 NOTE — PROGRESS NOTES
Subjective   Torin Granados is a 71 y.o. male.     History of Present Illness     Diabetes  Current symptoms include none. Patient denies paresthesia of the feet, visual disturbances, polydipsia, polyuria, hypoglycemia and foot ulcerations. Evaluation to date has been: hemoglobin A1C. Home sugars: have generally been in the mid 100s fasting. Current treatments: metformin and GLP agonist - trulicity.  He had to stop farxiga due to intolerable side effects. He has not experienced any problems with the trulicity thus far  Last dilated eye exam within one year. He has not had any recent labs     Dyslipidemia  Compliance with treatment has been excellent. The patient exercises daily. He is currently being prescribed the following medication for his dyslipidemia - simvastatin, ezetimibe. Patient denies side effects associated with his medications.     Hypertension  Home blood pressure readings: not doing. Associated signs and symptoms: none. Patient denies: chest pain, palpitations, dyspnea, orthopnea, paroxysmal nocturnal dyspnea and peripheral edema. Current antihypertensive medications includes lisinopril. Medication compliance: taking as prescribed.     Depression  His mother  since her was last here. This has been difficult but he feels that he is coping. He remains off alcohol. He denies any suicidal ideation. He has a supportive family and group of friends    Back Pain  The patient has had chronic back pain. Symptoms have been present for a number of  years and are unchanged.  Onset was related to / precipitated by a remote injury. The pain is located in the bilateral lumbar area and does not radiate. The pain is described as aching and stiffness and occurs intermittently. He is currently in no pain. Symptoms are exacerbated by nothing in particular. Symptoms are improved by gentle exercise/stretches, heat, NSAIDs and tramadol. He has also tried TENS unit, PT treatment and pain management treatment. He has no  other symptoms associated with the back pain. He denies any weakness in the right leg, weakness in the left leg, tingling in the right leg, tingling in the left leg, change in bladder function and change in bowel function    The following portions of the patient's history were reviewed and updated as appropriate: allergies, current medications, past medical history, past social history and problem list.    Review of Systems   Constitutional: Negative for appetite change, chills, fatigue, fever and unexpected weight change.   HENT: Negative for congestion, ear pain, rhinorrhea, sneezing, sore throat and voice change.    Eyes: Negative for visual disturbance.   Respiratory: Negative for cough, shortness of breath and wheezing.    Cardiovascular: Negative for chest pain, palpitations and leg swelling.   Gastrointestinal: Negative for abdominal pain, blood in stool, constipation, diarrhea, nausea and vomiting.   Endocrine: Negative for polydipsia and polyuria.   Genitourinary: Negative for difficulty urinating, dysuria, frequency, hematuria and urgency.   Musculoskeletal: Positive for arthralgias and back pain. Negative for joint swelling, myalgias and neck pain.   Skin: Negative for color change.   Neurological: Negative for tremors, weakness, numbness and headaches.   Psychiatric/Behavioral: Positive for dysphoric mood. Negative for sleep disturbance and suicidal ideas. The patient is not nervous/anxious.      Objective   Physical Exam   Constitutional: He is oriented to person, place, and time. He appears well-developed and well-nourished. He is cooperative. He does not have a sickly appearance. No distress.   Bright and in fair spirits. No apparent distress. No pallor, jaundice, diaphoresis, or cyanosis.   HENT:   Head: Atraumatic.   Right Ear: Tympanic membrane, external ear and ear canal normal.   Left Ear: Tympanic membrane, external ear and ear canal normal.   Mouth/Throat: Oropharynx is clear and moist.   Eyes:  EOM are normal. Pupils are equal, round, and reactive to light. No scleral icterus.   Neck: No JVD present. Carotid bruit is not present. No tracheal deviation present. No thyromegaly present.   Cardiovascular: Normal rate, regular rhythm, S1 normal, S2 normal, normal heart sounds and intact distal pulses.  Exam reveals no gallop.    No murmur heard.  Pulmonary/Chest: Breath sounds normal. He has no wheezes. He has no rales.   Abdominal: Soft. Normal aorta and bowel sounds are normal. He exhibits no abdominal bruit and no mass. There is no hepatosplenomegaly. There is no tenderness. No hernia.   Musculoskeletal: He exhibits no deformity.   Lymphadenopathy:        Head (right side): No submandibular adenopathy present.        Head (left side): No submandibular adenopathy present.     He has no cervical adenopathy.   Neurological: He is alert and oriented to person, place, and time. He has normal strength and normal reflexes. He displays normal reflexes. No cranial nerve deficit. He exhibits normal muscle tone. Coordination normal.   Skin: Skin is warm and dry. No rash noted. He is not diaphoretic. No pallor. Nails show no clubbing.   Psychiatric: He has a normal mood and affect. His behavior is normal. Thought content normal.     Assessment/Plan   Problems Addressed this Visit        Cardiovascular and Mediastinum    Atherosclerosis of aorta  Reminded regarding risk factor modification with an emphasis on tobacco cessation.    Essential hypertension  Hypertension: at goal. Evidence of target organ damage: atherosclerosis of the aorta on imaging.  Encouraged to continue to work on diet and exercise plan.   Continue current medication  Fasting labs will be updated just prior to his return in 3-4 months    Relevant Medications    lisinopril (PRINIVIL,ZESTRIL) 10 MG tablet    Other Relevant Orders    CBC & Differential    Comprehensive Metabolic Panel    Mixed hyperlipidemia  As above.   Continue current medication.     Relevant Medications    ezetimibe (ZETIA) 10 MG tablet    simvastatin (ZOCOR) 20 MG tablet    Coronary artery calcification seen on CT scan       Digestive    Gastroesophageal reflux disease without esophagitis       Endocrine    Type 2 diabetes mellitus without complication, without long-term current use of insulin  Diabetes mellitus Type II, under unknown control.   Encouraged to continue to pursue ADA diet  Encouraged aerobic exercise.  Reminded to get yearly retinal exam.    Relevant Orders    Hemoglobin A1c    MicroAlbumin, Urine, Random       Nervous and Auditory    Mechanical low back pain    Relevant Medications    meloxicam (MOBIC) 15 MG tablet       Other    Anxiety associated with depression    Relevant Orders    TSH    Current smoker    Healthcare maintenance    Relevant Orders    Hepatitis C Antibody    Grief reaction  Uncomplicated at present  Supportive therapy  Encouraged to report if any worse or if any new symptoms or concerns.

## 2017-12-04 ENCOUNTER — LAB (OUTPATIENT)
Dept: FAMILY MEDICINE CLINIC | Facility: CLINIC | Age: 72
End: 2017-12-04

## 2017-12-04 DIAGNOSIS — I10 ESSENTIAL HYPERTENSION: ICD-10-CM

## 2017-12-04 DIAGNOSIS — E11.9 TYPE 2 DIABETES MELLITUS WITHOUT COMPLICATION, WITHOUT LONG-TERM CURRENT USE OF INSULIN (HCC): ICD-10-CM

## 2017-12-04 DIAGNOSIS — F41.8 ANXIETY ASSOCIATED WITH DEPRESSION: ICD-10-CM

## 2017-12-04 DIAGNOSIS — Z00.00 HEALTHCARE MAINTENANCE: ICD-10-CM

## 2017-12-04 DIAGNOSIS — E78.49 OTHER HYPERLIPIDEMIA: ICD-10-CM

## 2017-12-05 LAB
ALBUMIN SERPL-MCNC: 4.6 G/DL (ref 3.4–4.8)
ALBUMIN/GLOB SERPL: 1.9 G/DL (ref 1.5–2.5)
ALP SERPL-CCNC: 65 U/L (ref 40–129)
ALT SERPL-CCNC: 22 U/L (ref 10–44)
AST SERPL-CCNC: 25 U/L (ref 10–34)
BASOPHILS # BLD AUTO: 0.01 10*3/MM3 (ref 0–0.3)
BASOPHILS NFR BLD AUTO: 0.1 % (ref 0–2)
BILIRUB SERPL-MCNC: 0.5 MG/DL (ref 0.2–1.8)
BUN SERPL-MCNC: 15 MG/DL (ref 7–21)
BUN/CREAT SERPL: 18.1 (ref 7–25)
CALCIUM SERPL-MCNC: 9.4 MG/DL (ref 7.7–10)
CHLORIDE SERPL-SCNC: 103 MMOL/L (ref 99–112)
CHOLEST SERPL-MCNC: 132 MG/DL (ref 0–200)
CO2 SERPL-SCNC: 27.1 MMOL/L (ref 24.3–31.9)
CREAT SERPL-MCNC: 0.83 MG/DL (ref 0.43–1.29)
EOSINOPHIL # BLD AUTO: 0.04 10*3/MM3 (ref 0–0.7)
EOSINOPHIL NFR BLD AUTO: 0.6 % (ref 0–7)
ERYTHROCYTE [DISTWIDTH] IN BLOOD BY AUTOMATED COUNT: 14.2 % (ref 11.5–14.5)
GFR SERPLBLD CREATININE-BSD FMLA CKD-EPI: 111 ML/MIN/1.73
GFR SERPLBLD CREATININE-BSD FMLA CKD-EPI: 92 ML/MIN/1.73
GLOBULIN SER CALC-MCNC: 2.4 GM/DL
GLUCOSE SERPL-MCNC: 162 MG/DL (ref 70–110)
HBA1C MFR BLD: 8.1 % (ref 4.5–5.7)
HCT VFR BLD AUTO: 44.9 % (ref 42–52)
HCV AB S/CO SERPL IA: <0.1 S/CO RATIO (ref 0–0.9)
HDLC SERPL-MCNC: 50 MG/DL (ref 60–100)
HGB BLD-MCNC: 15.3 G/DL (ref 14–18)
IMM GRANULOCYTES # BLD: 0.01 10*3/MM3 (ref 0–0.03)
IMM GRANULOCYTES NFR BLD: 0.1 % (ref 0–0.5)
LDLC SERPL CALC-MCNC: 71 MG/DL (ref 0–100)
LYMPHOCYTES # BLD AUTO: 2.21 10*3/MM3 (ref 1–3)
LYMPHOCYTES NFR BLD AUTO: 33.1 % (ref 16–46)
MCH RBC QN AUTO: 31 PG (ref 27–33)
MCHC RBC AUTO-ENTMCNC: 34.1 G/DL (ref 33–37)
MCV RBC AUTO: 90.9 FL (ref 80–94)
MICROALBUMIN UR-MCNC: <3 UG/ML
MONOCYTES # BLD AUTO: 0.42 10*3/MM3 (ref 0.1–0.9)
MONOCYTES NFR BLD AUTO: 6.3 % (ref 0–12)
NEUTROPHILS # BLD AUTO: 3.98 10*3/MM3 (ref 1.4–6.5)
NEUTROPHILS NFR BLD AUTO: 59.8 % (ref 40–75)
PLATELET # BLD AUTO: 218 10*3/MM3 (ref 130–400)
POTASSIUM SERPL-SCNC: 4.5 MMOL/L (ref 3.5–5.3)
PROT SERPL-MCNC: 7 G/DL (ref 6–8)
RBC # BLD AUTO: 4.94 10*6/MM3 (ref 4.7–6.1)
SODIUM SERPL-SCNC: 136 MMOL/L (ref 135–153)
TRIGL SERPL-MCNC: 55 MG/DL (ref 0–150)
TSH SERPL DL<=0.005 MIU/L-ACNC: 1.27 MIU/ML (ref 0.55–4.78)
VLDLC SERPL CALC-MCNC: 11 MG/DL
WBC # BLD AUTO: 6.67 10*3/MM3 (ref 4.5–12.5)

## 2018-01-03 DIAGNOSIS — E29.1 ANDROGEN DEFICIENCY: ICD-10-CM

## 2018-01-03 RX ORDER — TESTOSTERONE 16.2 MG/G
40.5 GEL TRANSDERMAL DAILY
Qty: 225 G | Refills: 5 | Status: SHIPPED | OUTPATIENT
Start: 2018-01-03 | End: 2018-10-18 | Stop reason: SDUPTHER

## 2018-01-15 ENCOUNTER — OFFICE VISIT (OUTPATIENT)
Dept: FAMILY MEDICINE CLINIC | Facility: CLINIC | Age: 73
End: 2018-01-15

## 2018-01-15 VITALS
TEMPERATURE: 97.3 F | WEIGHT: 131 LBS | BODY MASS INDEX: 19.85 KG/M2 | HEIGHT: 68 IN | DIASTOLIC BLOOD PRESSURE: 65 MMHG | SYSTOLIC BLOOD PRESSURE: 130 MMHG | HEART RATE: 76 BPM | OXYGEN SATURATION: 98 % | RESPIRATION RATE: 12 BRPM

## 2018-01-15 DIAGNOSIS — I10 ESSENTIAL HYPERTENSION: ICD-10-CM

## 2018-01-15 DIAGNOSIS — I25.10 CORONARY ARTERY CALCIFICATION SEEN ON CT SCAN: ICD-10-CM

## 2018-01-15 DIAGNOSIS — F17.200 CURRENT SMOKER: ICD-10-CM

## 2018-01-15 DIAGNOSIS — M54.59 MECHANICAL LOW BACK PAIN: ICD-10-CM

## 2018-01-15 DIAGNOSIS — I70.0 ATHEROSCLEROSIS OF AORTA (HCC): Primary | ICD-10-CM

## 2018-01-15 DIAGNOSIS — E78.2 MIXED HYPERLIPIDEMIA: ICD-10-CM

## 2018-01-15 DIAGNOSIS — M81.0 AGE-RELATED OSTEOPOROSIS WITHOUT CURRENT PATHOLOGICAL FRACTURE: ICD-10-CM

## 2018-01-15 DIAGNOSIS — Z00.00 HEALTHCARE MAINTENANCE: ICD-10-CM

## 2018-01-15 DIAGNOSIS — K21.9 GASTROESOPHAGEAL REFLUX DISEASE WITHOUT ESOPHAGITIS: ICD-10-CM

## 2018-01-15 DIAGNOSIS — Z86.010 HISTORY OF COLONIC POLYPS: ICD-10-CM

## 2018-01-15 DIAGNOSIS — E11.9 TYPE 2 DIABETES MELLITUS WITHOUT COMPLICATION, WITHOUT LONG-TERM CURRENT USE OF INSULIN (HCC): ICD-10-CM

## 2018-01-15 DIAGNOSIS — Z87.891 PERSONAL HISTORY OF NICOTINE DEPENDENCE: ICD-10-CM

## 2018-01-15 DIAGNOSIS — F41.8 ANXIETY ASSOCIATED WITH DEPRESSION: ICD-10-CM

## 2018-01-15 PROCEDURE — 99214 OFFICE O/P EST MOD 30 MIN: CPT | Performed by: GENERAL PRACTICE

## 2018-01-15 NOTE — PROGRESS NOTES
Subjective   Torin Granados is a 72 y.o. male.     History of Present Illness     Diabetes  Current symptoms include none. Patient denies paresthesia of the feet, visual disturbances, polydipsia, polyuria, hypoglycemia and foot ulcerations. Evaluation to date has been: hemoglobin A1C. Home sugars: have generally been in the mid 100s fasting. Current treatments: metformin and GLP agonist - trulicity.  He denies any apparent side effects.  Most recent hemoglobin A1c  Lab Results   Component Value Date    HGBA1C 8.10 (H) 12/04/2017      Dyslipidemia  Compliance with treatment has been excellent. The patient exercises daily. He is currently being prescribed the following medication for his dyslipidemia - simvastatin and ezetimibe. Patient denies side effects associated with his medications. Most recent lipid profile  Lab Results   Component Value Date    CHOL 146 02/09/2017    CHLPL 132 12/04/2017    TRIG 55 12/04/2017    HDL 50 (L) 12/04/2017    LDLCALC 73 02/09/2017    LDL 71 12/04/2017     Hypertension  Home blood pressure readings: not doing. Associated signs and symptoms: none. Patient denies: chest pain, palpitations, dyspnea, orthopnea, paroxysmal nocturnal dyspnea and peripheral edema. Current antihypertensive medications includes lisinopril. Medication compliance: taking as prescribed.   Lab Results   Component Value Date    CREATININE 0.83 12/04/2017     Back Pain  The patient has had chronic back pain. Symptoms have been present for a number of  years and are unchanged.  Onset was related to / precipitated by a remote injury. The pain is located in the bilateral lumbar area and does not radiate. The pain is described as aching and stiffness and occurs intermittently. He is currently in no pain. Symptoms are exacerbated by nothing in particular. Symptoms are improved by gentle exercise/stretches, heat, NSAIDs and tramadol. He has also tried TENS unit, PT treatment and pain management treatment. He has no other  symptoms associated with the back pain. He denies any weakness in the right leg, weakness in the left leg, tingling in the right leg, tingling in the left leg, change in bladder function and change in bowel function    The following portions of the patient's history were reviewed and updated as appropriate: allergies, current medications, past medical history, past social history and problem list.    Review of Systems   Constitutional: Negative for appetite change, chills, fatigue, fever and unexpected weight change.   HENT: Negative for congestion, ear pain, rhinorrhea, sneezing, sore throat and voice change.    Eyes: Negative for visual disturbance.   Respiratory: Negative for cough, shortness of breath and wheezing.    Cardiovascular: Negative for chest pain, palpitations and leg swelling.   Gastrointestinal: Negative for abdominal pain, blood in stool, constipation, diarrhea, nausea and vomiting.   Endocrine: Negative for polydipsia and polyuria.   Genitourinary: Negative for difficulty urinating, dysuria, frequency, hematuria and urgency.   Musculoskeletal: Positive for arthralgias and back pain. Negative for joint swelling, myalgias and neck pain.   Skin: Negative for color change.   Neurological: Negative for tremors, weakness, numbness and headaches.   Psychiatric/Behavioral: Negative for dysphoric mood, sleep disturbance and suicidal ideas. The patient is not nervous/anxious.      Objective   Physical Exam   Constitutional: He is oriented to person, place, and time. He appears well-developed and well-nourished. He is cooperative. He does not have a sickly appearance. No distress.   Bright and in fair spirits. No apparent distress. No pallor, jaundice, diaphoresis, or cyanosis.   HENT:   Head: Atraumatic.   Right Ear: Tympanic membrane, external ear and ear canal normal.   Left Ear: Tympanic membrane, external ear and ear canal normal.   Mouth/Throat: Oropharynx is clear and moist.   Eyes: EOM are normal.  Pupils are equal, round, and reactive to light. No scleral icterus.   Neck: No JVD present. Carotid bruit is not present. No tracheal deviation present. No thyromegaly present.   Cardiovascular: Normal rate, regular rhythm, S1 normal, S2 normal, normal heart sounds and intact distal pulses.  Exam reveals no gallop.    No murmur heard.  Pulmonary/Chest: Breath sounds normal. He has no wheezes. He has no rales.   Abdominal: Soft. Normal aorta and bowel sounds are normal. He exhibits no abdominal bruit and no mass. There is no hepatosplenomegaly. There is no tenderness. No hernia.   Musculoskeletal: He exhibits no deformity.   Lymphadenopathy:        Head (right side): No submandibular adenopathy present.        Head (left side): No submandibular adenopathy present.     He has no cervical adenopathy.   Neurological: He is alert and oriented to person, place, and time. He has normal strength and normal reflexes. He displays normal reflexes. No cranial nerve deficit. He exhibits normal muscle tone. Coordination normal.   Skin: Skin is warm and dry. No rash noted. He is not diaphoretic. No pallor. Nails show no clubbing.   Psychiatric: He has a normal mood and affect. His behavior is normal. Thought content normal.     Assessment/Plan   Problems Addressed this Visit        Cardiovascular and Mediastinum    Atherosclerosis of aorta  Reminded regarding risk factor modification with an emphasis on tobacco cessation.    Essential hypertension  Encouraged to continue to work on his diet and exercise plan.  Continue current medication  Fasting lab work will be updated just prior to his return in 3-4 months     Relevant Orders    CBC & Differential    Comprehensive Metabolic Panel    Mixed hyperlipidemia  As above.   Continue current medication.    Relevant Orders    Lipid Panel    Coronary artery calcification seen on CT scan  As above.        Digestive    Gastroesophageal reflux disease without esophagitis       Endocrine     Type 2 diabetes mellitus without complication, without long-term current use of insulin  Diabetes mellitus Type II, under fair control.   Encouraged to continue to pursue ADA diet  Encouraged aerobic exercise.  Increased dose of GLP agonist; see medication orders.  Reminded to get yearly retinal exam.    Relevant Medications    Dulaglutide (TRULICITY) 1.5 MG/0.5ML solution pen-injector    Other Relevant Orders    Hemoglobin A1c    MicroAlbumin, Urine, Random - Urine, Clean Catch       Nervous and Auditory    Mechanical low back pain  Reminded regarding symptomatic treatment.   Continue current medication       Other    Anxiety associated with depression    Current smoker    Relevant Orders    CT Chest Low Dose Wo    Healthcare maintenance  Reviewed the potential benefits of shingrix. Prescription written.  We'll arrange an updated low-dose CT of the chest for lung cancer screening this spring along with a screening DEXA scan     Relevant Medications    Zoster Vac Recomb Adjuvanted (SHINGRIX) 50 MCG reconstituted suspension    Other Relevant Orders    CT Chest Low Dose Wo    DEXA Bone Density Axial    History of colonic polyps

## 2018-01-26 ENCOUNTER — HOSPITAL ENCOUNTER (OUTPATIENT)
Dept: CT IMAGING | Facility: HOSPITAL | Age: 73
Discharge: HOME | End: 2018-01-26

## 2018-02-12 DIAGNOSIS — K21.9 GASTROESOPHAGEAL REFLUX DISEASE WITHOUT ESOPHAGITIS: ICD-10-CM

## 2018-02-12 RX ORDER — LANSOPRAZOLE 30 MG/1
CAPSULE, DELAYED RELEASE ORAL
Qty: 90 CAPSULE | Refills: 3 | Status: SHIPPED | OUTPATIENT
Start: 2018-02-12 | End: 2019-02-11 | Stop reason: SDUPTHER

## 2018-02-28 DIAGNOSIS — E11.9 TYPE 2 DIABETES MELLITUS WITHOUT COMPLICATION, WITHOUT LONG-TERM CURRENT USE OF INSULIN (HCC): ICD-10-CM

## 2018-03-02 ENCOUNTER — HOSPITAL ENCOUNTER (OUTPATIENT)
Dept: BONE DENSITY | Facility: HOSPITAL | Age: 73
Discharge: HOME | End: 2018-03-02

## 2018-03-02 ENCOUNTER — HOSPITAL ENCOUNTER (OUTPATIENT)
Dept: CT IMAGING | Facility: HOSPITAL | Age: 73
Discharge: HOME | End: 2018-03-02
Admitting: GENERAL PRACTICE

## 2018-03-02 DIAGNOSIS — F17.200 CURRENT SMOKER: ICD-10-CM

## 2018-03-02 DIAGNOSIS — Z00.00 HEALTHCARE MAINTENANCE: ICD-10-CM

## 2018-03-02 DIAGNOSIS — Z87.891 PERSONAL HISTORY OF NICOTINE DEPENDENCE: ICD-10-CM

## 2018-03-02 DIAGNOSIS — M81.0 AGE-RELATED OSTEOPOROSIS WITHOUT CURRENT PATHOLOGICAL FRACTURE: ICD-10-CM

## 2018-03-02 PROCEDURE — G0297 LDCT FOR LUNG CA SCREEN: HCPCS

## 2018-03-02 PROCEDURE — 71250 CT THORAX DX C-: CPT | Performed by: RADIOLOGY

## 2018-03-02 PROCEDURE — 77080 DXA BONE DENSITY AXIAL: CPT

## 2018-03-02 PROCEDURE — 77080 DXA BONE DENSITY AXIAL: CPT | Performed by: RADIOLOGY

## 2018-03-02 NOTE — PROGRESS NOTES
Sent letter to pt about the following per Dr. Dudley. [facility]    -- Please let patient know that his chest ct was ok - will be due again in one year

## 2018-04-05 DIAGNOSIS — M54.59 MECHANICAL LOW BACK PAIN: ICD-10-CM

## 2018-04-05 RX ORDER — TRAMADOL HYDROCHLORIDE 50 MG/1
TABLET ORAL
Qty: 360 TABLET | Refills: 1 | Status: SHIPPED | OUTPATIENT
Start: 2018-04-05 | End: 2018-10-10 | Stop reason: SDUPTHER

## 2018-04-09 ENCOUNTER — LAB (OUTPATIENT)
Dept: FAMILY MEDICINE CLINIC | Facility: CLINIC | Age: 73
End: 2018-04-09

## 2018-04-09 DIAGNOSIS — E78.2 MIXED HYPERLIPIDEMIA: ICD-10-CM

## 2018-04-09 DIAGNOSIS — I10 ESSENTIAL HYPERTENSION: ICD-10-CM

## 2018-04-09 DIAGNOSIS — E11.9 TYPE 2 DIABETES MELLITUS WITHOUT COMPLICATION, WITHOUT LONG-TERM CURRENT USE OF INSULIN (HCC): ICD-10-CM

## 2018-04-10 LAB
ALBUMIN SERPL-MCNC: 4.3 G/DL (ref 3.4–4.8)
ALBUMIN/GLOB SERPL: 2 G/DL (ref 1.5–2.5)
ALP SERPL-CCNC: 54 U/L (ref 40–129)
ALT SERPL-CCNC: 22 U/L (ref 10–44)
AST SERPL-CCNC: 20 U/L (ref 10–34)
BASOPHILS # BLD AUTO: 0.02 10*3/MM3 (ref 0–0.3)
BASOPHILS NFR BLD AUTO: 0.3 % (ref 0–2)
BILIRUB SERPL-MCNC: 0.5 MG/DL (ref 0.2–1.8)
BUN SERPL-MCNC: 13 MG/DL (ref 7–21)
BUN/CREAT SERPL: 17.6 (ref 7–25)
CALCIUM SERPL-MCNC: 8.8 MG/DL (ref 7.7–10)
CHLORIDE SERPL-SCNC: 101 MMOL/L (ref 99–112)
CHOLEST SERPL-MCNC: 140 MG/DL (ref 0–200)
CO2 SERPL-SCNC: 25.7 MMOL/L (ref 24.3–31.9)
CREAT SERPL-MCNC: 0.74 MG/DL (ref 0.43–1.29)
EOSINOPHIL # BLD AUTO: 0.07 10*3/MM3 (ref 0–0.7)
EOSINOPHIL NFR BLD AUTO: 0.9 % (ref 0–7)
ERYTHROCYTE [DISTWIDTH] IN BLOOD BY AUTOMATED COUNT: 13.9 % (ref 11.5–14.5)
GFR SERPLBLD CREATININE-BSD FMLA CKD-EPI: 105 ML/MIN/1.73
GFR SERPLBLD CREATININE-BSD FMLA CKD-EPI: 127 ML/MIN/1.73
GLOBULIN SER CALC-MCNC: 2.2 GM/DL
GLUCOSE SERPL-MCNC: 171 MG/DL (ref 70–110)
HBA1C MFR BLD: 7.9 % (ref 4.5–5.7)
HCT VFR BLD AUTO: 44.4 % (ref 42–52)
HDLC SERPL-MCNC: 55 MG/DL (ref 60–100)
HGB BLD-MCNC: 15.4 G/DL (ref 14–18)
IMM GRANULOCYTES # BLD: 0.01 10*3/MM3 (ref 0–0.03)
IMM GRANULOCYTES NFR BLD: 0.1 % (ref 0–0.5)
LDLC SERPL CALC-MCNC: 75 MG/DL (ref 0–100)
LYMPHOCYTES # BLD AUTO: 2.3 10*3/MM3 (ref 1–3)
LYMPHOCYTES NFR BLD AUTO: 29.2 % (ref 16–46)
MCH RBC QN AUTO: 31.1 PG (ref 27–33)
MCHC RBC AUTO-ENTMCNC: 34.7 G/DL (ref 33–37)
MCV RBC AUTO: 89.7 FL (ref 80–94)
MICROALBUMIN UR-MCNC: <3 UG/ML
MONOCYTES # BLD AUTO: 0.68 10*3/MM3 (ref 0.1–0.9)
MONOCYTES NFR BLD AUTO: 8.6 % (ref 0–12)
NEUTROPHILS # BLD AUTO: 4.81 10*3/MM3 (ref 1.4–6.5)
NEUTROPHILS NFR BLD AUTO: 60.9 % (ref 40–75)
PLATELET # BLD AUTO: 183 10*3/MM3 (ref 130–400)
POTASSIUM SERPL-SCNC: 4.5 MMOL/L (ref 3.5–5.3)
PROT SERPL-MCNC: 6.5 G/DL (ref 6–8)
RBC # BLD AUTO: 4.95 10*6/MM3 (ref 4.7–6.1)
SODIUM SERPL-SCNC: 134 MMOL/L (ref 135–153)
TRIGL SERPL-MCNC: 52 MG/DL (ref 0–150)
VLDLC SERPL CALC-MCNC: 10.4 MG/DL
WBC # BLD AUTO: 7.89 10*3/MM3 (ref 4.5–12.5)

## 2018-04-16 ENCOUNTER — OFFICE VISIT (OUTPATIENT)
Dept: FAMILY MEDICINE CLINIC | Facility: CLINIC | Age: 73
End: 2018-04-16

## 2018-04-16 VITALS
RESPIRATION RATE: 12 BRPM | HEIGHT: 68 IN | DIASTOLIC BLOOD PRESSURE: 80 MMHG | BODY MASS INDEX: 19.85 KG/M2 | HEART RATE: 87 BPM | WEIGHT: 131 LBS | SYSTOLIC BLOOD PRESSURE: 125 MMHG | OXYGEN SATURATION: 96 % | TEMPERATURE: 98.1 F

## 2018-04-16 DIAGNOSIS — M85.89 OSTEOPENIA OF MULTIPLE SITES: ICD-10-CM

## 2018-04-16 DIAGNOSIS — E78.2 MIXED HYPERLIPIDEMIA: ICD-10-CM

## 2018-04-16 DIAGNOSIS — E11.9 TYPE 2 DIABETES MELLITUS WITHOUT COMPLICATION, WITHOUT LONG-TERM CURRENT USE OF INSULIN (HCC): ICD-10-CM

## 2018-04-16 DIAGNOSIS — K21.9 GASTROESOPHAGEAL REFLUX DISEASE WITHOUT ESOPHAGITIS: ICD-10-CM

## 2018-04-16 DIAGNOSIS — M54.59 MECHANICAL LOW BACK PAIN: ICD-10-CM

## 2018-04-16 DIAGNOSIS — I10 ESSENTIAL HYPERTENSION: ICD-10-CM

## 2018-04-16 DIAGNOSIS — I70.0 ATHEROSCLEROSIS OF AORTA (HCC): Primary | ICD-10-CM

## 2018-04-16 DIAGNOSIS — Z00.00 HEALTHCARE MAINTENANCE: ICD-10-CM

## 2018-04-16 DIAGNOSIS — F17.200 CURRENT SMOKER: ICD-10-CM

## 2018-04-16 DIAGNOSIS — I25.10 CORONARY ARTERY CALCIFICATION SEEN ON CT SCAN: ICD-10-CM

## 2018-04-16 DIAGNOSIS — F41.8 ANXIETY ASSOCIATED WITH DEPRESSION: ICD-10-CM

## 2018-04-16 PROCEDURE — 99214 OFFICE O/P EST MOD 30 MIN: CPT | Performed by: GENERAL PRACTICE

## 2018-04-16 NOTE — PROGRESS NOTES
Subjective   Torin Granados is a 72 y.o. male.     History of Present Illness     Diabetes  Current symptoms include none. Patient denies paresthesia of the feet, visual disturbances, polydipsia, polyuria, hypoglycemia and foot ulcerations. Evaluation to date has been: hemoglobin A1C. Home sugars: have generally remained in the mid 100s fasting. Current treatments: metformin and GLP agonist - trulicity.  He started on the 1.5 mg dose of the latter over a month ago and denies any apparent side effects thus far.  Most recent hemoglobin A1c  Lab Results   Component Value Date    HGBA1C 7.90 (H) 04/09/2018      Dyslipidemia  Compliance with treatment has been excellent. The patient exercises daily. He is currently being prescribed the following medication for his dyslipidemia - simvastatin and ezetimibe. Patient denies side effects associated with his medications. Most recent lipid profile  Lab Results   Component Value Date    CHOL 146 02/09/2017    CHLPL 140 04/09/2018    TRIG 52 04/09/2018    HDL 55 (L) 04/09/2018    LDL 75 04/09/2018     Hypertension  Home blood pressure readings: not doing. Associated signs and symptoms: none. Patient denies: chest pain, palpitations, dyspnea, orthopnea, paroxysmal nocturnal dyspnea and peripheral edema. Current antihypertensive medications includes lisinopril. Medication compliance: taking as prescribed.   Lab Results   Component Value Date    CREATININE 0.74 04/09/2018     Back Pain  The patient has had chronic back pain. Symptoms have been present for a number of  years and remain unchanged.  Onset was related to / precipitated by a remote injury. The pain is located in the bilateral lumbar area and does not radiate. The pain is described as aching and stiffness and occurs intermittently. He is currently in no pain. Symptoms are exacerbated by nothing in particular. Symptoms are improved by gentle exercise/stretches, heat, NSAIDs and tramadol. He has also tried TENS unit, PT  treatment and pain management treatment. He has no other symptoms associated with the back pain. He denies any weakness in the right leg, weakness in the left leg, tingling in the right leg, tingling in the left leg, change in bladder function and change in bowel function    Imaging  DEXA scan performed on 1/15/18 returned with a T score as low as -1.9 at the spine    The following portions of the patient's history were reviewed and updated as appropriate: allergies, current medications, past medical history, past social history and problem list.    Review of Systems   Constitutional: Negative for appetite change, chills, fatigue, fever and unexpected weight change.   HENT: Negative for congestion, ear pain, rhinorrhea, sneezing, sore throat and voice change.    Eyes: Negative for visual disturbance.   Respiratory: Negative for cough, shortness of breath and wheezing.    Cardiovascular: Negative for chest pain, palpitations and leg swelling.   Gastrointestinal: Negative for abdominal pain, blood in stool, constipation, diarrhea, nausea and vomiting.   Endocrine: Negative for polydipsia and polyuria.   Genitourinary: Negative for difficulty urinating, dysuria, frequency, hematuria and urgency.   Musculoskeletal: Positive for arthralgias and back pain. Negative for joint swelling, myalgias and neck pain.   Skin: Negative for color change.   Neurological: Negative for tremors, weakness, numbness and headaches.   Psychiatric/Behavioral: Negative for dysphoric mood, sleep disturbance and suicidal ideas. The patient is not nervous/anxious.      Objective   Physical Exam   Constitutional: He is oriented to person, place, and time. He appears well-developed and well-nourished. He is cooperative. He does not have a sickly appearance. No distress.   Bright and in fair spirits. No apparent distress. No pallor, jaundice, diaphoresis, or cyanosis.   HENT:   Head: Atraumatic.   Right Ear: Tympanic membrane, external ear and ear  canal normal.   Left Ear: Tympanic membrane, external ear and ear canal normal.   Mouth/Throat: Oropharynx is clear and moist.   Eyes: EOM are normal. Pupils are equal, round, and reactive to light. No scleral icterus.   Neck: No JVD present. Carotid bruit is not present. No tracheal deviation present. No thyromegaly present.   Cardiovascular: Normal rate, regular rhythm, S1 normal, S2 normal, normal heart sounds and intact distal pulses.  Exam reveals no gallop.    No murmur heard.  Pulmonary/Chest: Breath sounds normal. He has no wheezes. He has no rales.   Abdominal: Soft. Normal aorta and bowel sounds are normal. He exhibits no abdominal bruit and no mass. There is no hepatosplenomegaly. There is no tenderness. No hernia.   Musculoskeletal: He exhibits no deformity.    Torin had a diabetic foot exam performed today.  Lymphadenopathy:        Head (right side): No submandibular adenopathy present.        Head (left side): No submandibular adenopathy present.     He has no cervical adenopathy.   Neurological: He is alert and oriented to person, place, and time. He has normal strength and normal reflexes. He displays normal reflexes. No cranial nerve deficit. He exhibits normal muscle tone. Coordination normal.   Skin: Skin is warm and dry. No rash noted. He is not diaphoretic. No pallor. Nails show no clubbing.   Psychiatric: He has a normal mood and affect. His behavior is normal. Thought content normal.     Assessment/Plan   Problems Addressed this Visit        Cardiovascular and Mediastinum    Atherosclerosis of aorta   Reminded regarding risk factor modification with an emphasis on smoking cessation, diet and exercise.    Essential hypertension  Encouraged to continue to work on his diet and exercise plan.  Continue current medication  Scheduled for updated labs just prior to her return.    Relevant Orders    CBC & Differential    Comprehensive Metabolic Panel    Mixed hyperlipidemia  As above.   Continue  current medication.    Relevant Orders    Lipid Panel    TSH    Coronary artery calcification seen on CT scan       Digestive    Gastroesophageal reflux disease without esophagitis       Endocrine    Type 2 diabetes mellitus without complication, without long-term current use of insulin  Diabetes mellitus Type II, under fair control.   Encouraged to continue to pursue ADA diet  Encouraged aerobic exercise.    Relevant Orders    Hemoglobin A1c    Vitamin B12    MicroAlbumin, Urine, Random - Urine, Clean Catch       Nervous and Auditory    Mechanical low back pain       Musculoskeletal and Integument    Osteopenia of multiple sites  Advise regarding the importance of weightbearing exercise  Vitamin D will be checked with his next labs     Relevant Orders    Vitamin D 25 Hydroxy       Other    Anxiety associated with depression    Current smoker    Healthcare maintenance  Patient received both doses of shingrix since last here    Relevant Orders    Vitamin D 25 Hydroxy

## 2018-07-26 DIAGNOSIS — E11.9 TYPE 2 DIABETES MELLITUS WITHOUT COMPLICATION, WITHOUT LONG-TERM CURRENT USE OF INSULIN (HCC): ICD-10-CM

## 2018-07-26 RX ORDER — DULAGLUTIDE 1.5 MG/.5ML
INJECTION, SOLUTION SUBCUTANEOUS
Qty: 2 ML | Refills: 5 | Status: SHIPPED | OUTPATIENT
Start: 2018-07-26 | End: 2019-03-04 | Stop reason: SDUPTHER

## 2018-08-06 DIAGNOSIS — F41.8 ANXIETY ASSOCIATED WITH DEPRESSION: ICD-10-CM

## 2018-08-06 RX ORDER — DULOXETIN HYDROCHLORIDE 60 MG/1
120 CAPSULE, DELAYED RELEASE ORAL DAILY
Qty: 180 CAPSULE | Refills: 3 | Status: SHIPPED | OUTPATIENT
Start: 2018-08-06 | End: 2019-12-16 | Stop reason: SDUPTHER

## 2018-08-10 ENCOUNTER — LAB (OUTPATIENT)
Dept: FAMILY MEDICINE CLINIC | Facility: CLINIC | Age: 73
End: 2018-08-10

## 2018-08-10 DIAGNOSIS — Z00.00 HEALTHCARE MAINTENANCE: ICD-10-CM

## 2018-08-10 DIAGNOSIS — E78.2 MIXED HYPERLIPIDEMIA: ICD-10-CM

## 2018-08-10 DIAGNOSIS — E11.9 TYPE 2 DIABETES MELLITUS WITHOUT COMPLICATION, WITHOUT LONG-TERM CURRENT USE OF INSULIN (HCC): ICD-10-CM

## 2018-08-10 DIAGNOSIS — M85.89 OSTEOPENIA OF MULTIPLE SITES: ICD-10-CM

## 2018-08-10 DIAGNOSIS — I10 ESSENTIAL HYPERTENSION: ICD-10-CM

## 2018-08-11 LAB
25(OH)D3+25(OH)D2 SERPL-MCNC: 35 NG/ML
ALBUMIN SERPL-MCNC: 4.3 G/DL (ref 3.4–4.8)
ALBUMIN/GLOB SERPL: 2.2 G/DL (ref 1.5–2.5)
ALP SERPL-CCNC: 56 U/L (ref 40–129)
ALT SERPL-CCNC: 21 U/L (ref 10–44)
AST SERPL-CCNC: 21 U/L (ref 10–34)
BASOPHILS # BLD AUTO: 0.02 10*3/MM3 (ref 0–0.3)
BASOPHILS NFR BLD AUTO: 0.2 % (ref 0–2)
BILIRUB SERPL-MCNC: 0.3 MG/DL (ref 0.2–1.8)
BUN SERPL-MCNC: 15 MG/DL (ref 7–21)
BUN/CREAT SERPL: 19.7 (ref 7–25)
CALCIUM SERPL-MCNC: 8.5 MG/DL (ref 7.7–10)
CHLORIDE SERPL-SCNC: 103 MMOL/L (ref 99–112)
CHOLEST SERPL-MCNC: 117 MG/DL (ref 0–200)
CO2 SERPL-SCNC: 27.3 MMOL/L (ref 24.3–31.9)
CREAT SERPL-MCNC: 0.76 MG/DL (ref 0.43–1.29)
EOSINOPHIL # BLD AUTO: 0.14 10*3/MM3 (ref 0–0.7)
EOSINOPHIL NFR BLD AUTO: 1.7 % (ref 0–7)
ERYTHROCYTE [DISTWIDTH] IN BLOOD BY AUTOMATED COUNT: 13.6 % (ref 11.5–14.5)
GLOBULIN SER CALC-MCNC: 2 GM/DL
GLUCOSE SERPL-MCNC: 144 MG/DL (ref 70–110)
HBA1C MFR BLD: 7.6 % (ref 4.5–5.7)
HCT VFR BLD AUTO: 43.6 % (ref 42–52)
HDLC SERPL-MCNC: 48 MG/DL (ref 60–100)
HGB BLD-MCNC: 15.1 G/DL (ref 14–18)
IMM GRANULOCYTES # BLD: 0.02 10*3/MM3 (ref 0–0.03)
IMM GRANULOCYTES NFR BLD: 0.2 % (ref 0–0.5)
LDLC SERPL CALC-MCNC: 60 MG/DL (ref 0–100)
LYMPHOCYTES # BLD AUTO: 2.31 10*3/MM3 (ref 1–3)
LYMPHOCYTES NFR BLD AUTO: 28 % (ref 16–46)
MCH RBC QN AUTO: 31.5 PG (ref 27–33)
MCHC RBC AUTO-ENTMCNC: 34.6 G/DL (ref 33–37)
MCV RBC AUTO: 90.8 FL (ref 80–94)
MICROALBUMIN UR-MCNC: <3 UG/ML
MONOCYTES # BLD AUTO: 0.65 10*3/MM3 (ref 0.1–0.9)
MONOCYTES NFR BLD AUTO: 7.9 % (ref 0–12)
NEUTROPHILS # BLD AUTO: 5.12 10*3/MM3 (ref 1.4–6.5)
NEUTROPHILS NFR BLD AUTO: 62 % (ref 40–75)
PLATELET # BLD AUTO: 207 10*3/MM3 (ref 130–400)
POTASSIUM SERPL-SCNC: 5 MMOL/L (ref 3.5–5.3)
PROT SERPL-MCNC: 6.3 G/DL (ref 6–8)
RBC # BLD AUTO: 4.8 10*6/MM3 (ref 4.7–6.1)
SODIUM SERPL-SCNC: 133 MMOL/L (ref 135–153)
TRIGL SERPL-MCNC: 44 MG/DL (ref 0–150)
TSH SERPL DL<=0.005 MIU/L-ACNC: 1.41 MIU/ML (ref 0.55–4.78)
VIT B12 SERPL-MCNC: 240 PG/ML (ref 211–911)
VLDLC SERPL CALC-MCNC: 8.8 MG/DL
WBC # BLD AUTO: 8.26 10*3/MM3 (ref 4.5–12.5)

## 2018-08-22 DIAGNOSIS — E11.9 TYPE 2 DIABETES MELLITUS WITHOUT COMPLICATION, WITHOUT LONG-TERM CURRENT USE OF INSULIN (HCC): ICD-10-CM

## 2018-08-31 ENCOUNTER — OFFICE VISIT (OUTPATIENT)
Dept: FAMILY MEDICINE CLINIC | Facility: CLINIC | Age: 73
End: 2018-08-31

## 2018-08-31 DIAGNOSIS — I10 ESSENTIAL HYPERTENSION: ICD-10-CM

## 2018-08-31 DIAGNOSIS — F41.8 ANXIETY ASSOCIATED WITH DEPRESSION: ICD-10-CM

## 2018-08-31 DIAGNOSIS — E78.2 MIXED HYPERLIPIDEMIA: ICD-10-CM

## 2018-08-31 DIAGNOSIS — I70.0 ATHEROSCLEROSIS OF AORTA (HCC): Primary | ICD-10-CM

## 2018-08-31 DIAGNOSIS — I25.10 CORONARY ARTERY CALCIFICATION SEEN ON CT SCAN: ICD-10-CM

## 2018-08-31 DIAGNOSIS — M54.59 MECHANICAL LOW BACK PAIN: ICD-10-CM

## 2018-08-31 DIAGNOSIS — F17.200 CURRENT SMOKER: ICD-10-CM

## 2018-08-31 DIAGNOSIS — Z00.00 HEALTHCARE MAINTENANCE: ICD-10-CM

## 2018-08-31 DIAGNOSIS — M85.89 OSTEOPENIA OF MULTIPLE SITES: ICD-10-CM

## 2018-08-31 DIAGNOSIS — E11.9 TYPE 2 DIABETES MELLITUS WITHOUT COMPLICATION, WITHOUT LONG-TERM CURRENT USE OF INSULIN (HCC): ICD-10-CM

## 2018-08-31 DIAGNOSIS — M54.17 LUMBOSACRAL RADICULOPATHY AT S1: ICD-10-CM

## 2018-08-31 DIAGNOSIS — K21.9 GASTROESOPHAGEAL REFLUX DISEASE WITHOUT ESOPHAGITIS: ICD-10-CM

## 2018-08-31 PROCEDURE — 99214 OFFICE O/P EST MOD 30 MIN: CPT | Performed by: GENERAL PRACTICE

## 2018-09-01 VITALS
OXYGEN SATURATION: 97 % | DIASTOLIC BLOOD PRESSURE: 65 MMHG | TEMPERATURE: 98.6 F | RESPIRATION RATE: 12 BRPM | WEIGHT: 131 LBS | HEART RATE: 86 BPM | BODY MASS INDEX: 19.85 KG/M2 | SYSTOLIC BLOOD PRESSURE: 155 MMHG | HEIGHT: 68 IN

## 2018-09-01 PROBLEM — M54.17 LUMBOSACRAL RADICULOPATHY AT S1: Status: ACTIVE | Noted: 2018-09-01

## 2018-09-01 PROBLEM — F43.21 GRIEF REACTION: Status: RESOLVED | Noted: 2017-09-11 | Resolved: 2018-09-01

## 2018-09-24 DIAGNOSIS — E78.49 OTHER HYPERLIPIDEMIA: ICD-10-CM

## 2018-09-24 RX ORDER — SIMVASTATIN 20 MG
20 TABLET ORAL NIGHTLY
Qty: 90 TABLET | Refills: 3 | Status: SHIPPED | OUTPATIENT
Start: 2018-09-24 | End: 2019-09-25 | Stop reason: SDUPTHER

## 2018-10-10 DIAGNOSIS — M54.59 MECHANICAL LOW BACK PAIN: ICD-10-CM

## 2018-10-10 DIAGNOSIS — I10 ESSENTIAL HYPERTENSION: ICD-10-CM

## 2018-10-10 RX ORDER — LISINOPRIL 10 MG/1
TABLET ORAL
Qty: 90 TABLET | Refills: 3 | Status: SHIPPED | OUTPATIENT
Start: 2018-10-10 | End: 2019-10-14 | Stop reason: SDUPTHER

## 2018-10-10 RX ORDER — TRAMADOL HYDROCHLORIDE 50 MG/1
TABLET ORAL
Qty: 360 TABLET | Refills: 1 | Status: SHIPPED | OUTPATIENT
Start: 2018-10-10 | End: 2019-04-11 | Stop reason: SDUPTHER

## 2018-10-18 DIAGNOSIS — E29.1 ANDROGEN DEFICIENCY: ICD-10-CM

## 2018-10-18 DIAGNOSIS — E78.49 OTHER HYPERLIPIDEMIA: ICD-10-CM

## 2018-10-18 RX ORDER — TESTOSTERONE 16.2 MG/G
GEL TRANSDERMAL
Qty: 225 G | Refills: 5 | Status: SHIPPED | OUTPATIENT
Start: 2018-10-18 | End: 2019-05-30 | Stop reason: SDUPTHER

## 2018-10-18 RX ORDER — EZETIMIBE 10 MG/1
TABLET ORAL
Qty: 90 TABLET | Refills: 3 | Status: SHIPPED | OUTPATIENT
Start: 2018-10-18 | End: 2019-07-26 | Stop reason: SDUPTHER

## 2018-11-26 DIAGNOSIS — M54.59 MECHANICAL LOW BACK PAIN: ICD-10-CM

## 2018-11-27 RX ORDER — MELOXICAM 15 MG/1
TABLET ORAL
Qty: 90 TABLET | Refills: 3 | Status: SHIPPED | OUTPATIENT
Start: 2018-11-27 | End: 2019-02-22

## 2018-11-30 ENCOUNTER — OFFICE VISIT (OUTPATIENT)
Dept: FAMILY MEDICINE CLINIC | Facility: CLINIC | Age: 73
End: 2018-11-30

## 2018-11-30 DIAGNOSIS — I25.10 CORONARY ARTERY CALCIFICATION SEEN ON CT SCAN: ICD-10-CM

## 2018-11-30 DIAGNOSIS — M54.59 MECHANICAL LOW BACK PAIN: ICD-10-CM

## 2018-11-30 DIAGNOSIS — F41.8 ANXIETY ASSOCIATED WITH DEPRESSION: ICD-10-CM

## 2018-11-30 DIAGNOSIS — F17.200 CURRENT SMOKER: ICD-10-CM

## 2018-11-30 DIAGNOSIS — M85.89 OSTEOPENIA OF MULTIPLE SITES: ICD-10-CM

## 2018-11-30 DIAGNOSIS — K21.9 GASTROESOPHAGEAL REFLUX DISEASE WITHOUT ESOPHAGITIS: ICD-10-CM

## 2018-11-30 DIAGNOSIS — I70.0 ATHEROSCLEROSIS OF AORTA (HCC): ICD-10-CM

## 2018-11-30 DIAGNOSIS — Z23 ENCOUNTER FOR IMMUNIZATION: ICD-10-CM

## 2018-11-30 DIAGNOSIS — Z00.00 HEALTHCARE MAINTENANCE: ICD-10-CM

## 2018-11-30 DIAGNOSIS — E11.9 TYPE 2 DIABETES MELLITUS WITHOUT COMPLICATION, WITHOUT LONG-TERM CURRENT USE OF INSULIN (HCC): ICD-10-CM

## 2018-11-30 DIAGNOSIS — E78.2 MIXED HYPERLIPIDEMIA: Primary | ICD-10-CM

## 2018-11-30 DIAGNOSIS — I10 ESSENTIAL HYPERTENSION: ICD-10-CM

## 2018-11-30 PROCEDURE — 90632 HEPA VACCINE ADULT IM: CPT | Performed by: GENERAL PRACTICE

## 2018-11-30 PROCEDURE — 90471 IMMUNIZATION ADMIN: CPT | Performed by: GENERAL PRACTICE

## 2018-11-30 PROCEDURE — 99214 OFFICE O/P EST MOD 30 MIN: CPT | Performed by: GENERAL PRACTICE

## 2018-12-01 VITALS
HEART RATE: 89 BPM | OXYGEN SATURATION: 98 % | BODY MASS INDEX: 19.25 KG/M2 | TEMPERATURE: 98 F | WEIGHT: 127 LBS | DIASTOLIC BLOOD PRESSURE: 75 MMHG | SYSTOLIC BLOOD PRESSURE: 125 MMHG | HEIGHT: 68 IN | RESPIRATION RATE: 12 BRPM

## 2019-02-11 DIAGNOSIS — K21.9 GASTROESOPHAGEAL REFLUX DISEASE WITHOUT ESOPHAGITIS: ICD-10-CM

## 2019-02-11 RX ORDER — LANSOPRAZOLE 30 MG/1
CAPSULE, DELAYED RELEASE ORAL
Qty: 90 CAPSULE | Refills: 3 | Status: SHIPPED | OUTPATIENT
Start: 2019-02-11 | End: 2019-11-18 | Stop reason: SDUPTHER

## 2019-02-14 ENCOUNTER — LAB (OUTPATIENT)
Dept: FAMILY MEDICINE CLINIC | Facility: CLINIC | Age: 74
End: 2019-02-14

## 2019-02-14 DIAGNOSIS — E11.9 TYPE 2 DIABETES MELLITUS WITHOUT COMPLICATION, WITHOUT LONG-TERM CURRENT USE OF INSULIN (HCC): ICD-10-CM

## 2019-02-14 DIAGNOSIS — E78.2 MIXED HYPERLIPIDEMIA: ICD-10-CM

## 2019-02-14 DIAGNOSIS — I10 ESSENTIAL HYPERTENSION: ICD-10-CM

## 2019-02-15 LAB
ALBUMIN SERPL-MCNC: 4.8 G/DL (ref 3.4–4.8)
ALBUMIN/GLOB SERPL: 2.2 G/DL (ref 1.5–2.5)
ALP SERPL-CCNC: 67 U/L (ref 40–129)
ALT SERPL-CCNC: 19 U/L (ref 10–44)
AST SERPL-CCNC: 21 U/L (ref 10–34)
BASOPHILS # BLD AUTO: 0.02 10*3/MM3 (ref 0–0.3)
BASOPHILS NFR BLD AUTO: 0.3 % (ref 0–2)
BILIRUB SERPL-MCNC: 0.5 MG/DL (ref 0.2–1.8)
BUN SERPL-MCNC: 15 MG/DL (ref 7–21)
BUN/CREAT SERPL: 17.9 (ref 7–25)
CALCIUM SERPL-MCNC: 9.2 MG/DL (ref 7.7–10)
CHLORIDE SERPL-SCNC: 102 MMOL/L (ref 99–112)
CHOLEST SERPL-MCNC: 149 MG/DL (ref 0–200)
CO2 SERPL-SCNC: 26.6 MMOL/L (ref 24.3–31.9)
CREAT SERPL-MCNC: 0.84 MG/DL (ref 0.43–1.29)
EOSINOPHIL # BLD AUTO: 0.07 10*3/MM3 (ref 0–0.7)
EOSINOPHIL NFR BLD AUTO: 0.9 % (ref 0–7)
ERYTHROCYTE [DISTWIDTH] IN BLOOD BY AUTOMATED COUNT: 12.8 % (ref 11.5–14.5)
GLOBULIN SER CALC-MCNC: 2.2 GM/DL
GLUCOSE SERPL-MCNC: 162 MG/DL (ref 70–110)
HBA1C MFR BLD: 8.5 % (ref 4.5–5.7)
HCT VFR BLD AUTO: 46.2 % (ref 42–52)
HDLC SERPL-MCNC: 57 MG/DL (ref 60–100)
HGB BLD-MCNC: 15.9 G/DL (ref 14–18)
IMM GRANULOCYTES # BLD AUTO: 0.01 10*3/MM3 (ref 0–0.03)
IMM GRANULOCYTES NFR BLD AUTO: 0.1 % (ref 0–0.5)
LDLC SERPL CALC-MCNC: 78 MG/DL (ref 0–100)
LYMPHOCYTES # BLD AUTO: 2.36 10*3/MM3 (ref 1–3)
LYMPHOCYTES NFR BLD AUTO: 30.6 % (ref 16–46)
MCH RBC QN AUTO: 31 PG (ref 27–33)
MCHC RBC AUTO-ENTMCNC: 34.4 G/DL (ref 33–37)
MCV RBC AUTO: 90.1 FL (ref 80–94)
MICROALBUMIN UR-MCNC: 3.9 UG/ML
MONOCYTES # BLD AUTO: 0.52 10*3/MM3 (ref 0.1–0.9)
MONOCYTES NFR BLD AUTO: 6.8 % (ref 0–12)
NEUTROPHILS # BLD AUTO: 4.72 10*3/MM3 (ref 1.4–6.5)
NEUTROPHILS NFR BLD AUTO: 61.3 % (ref 40–75)
PLATELET # BLD AUTO: 235 10*3/MM3 (ref 130–400)
POTASSIUM SERPL-SCNC: 4.8 MMOL/L (ref 3.5–5.3)
PROT SERPL-MCNC: 7 G/DL (ref 6–8)
RBC # BLD AUTO: 5.13 10*6/MM3 (ref 4.7–6.1)
SODIUM SERPL-SCNC: 133 MMOL/L (ref 135–153)
TRIGL SERPL-MCNC: 71 MG/DL (ref 0–150)
TSH SERPL DL<=0.005 MIU/L-ACNC: 2.42 MIU/ML (ref 0.55–4.78)
VLDLC SERPL CALC-MCNC: 14.2 MG/DL
WBC # BLD AUTO: 7.7 10*3/MM3 (ref 4.5–12.5)

## 2019-02-22 ENCOUNTER — OFFICE VISIT (OUTPATIENT)
Dept: FAMILY MEDICINE CLINIC | Facility: CLINIC | Age: 74
End: 2019-02-22

## 2019-02-22 DIAGNOSIS — F17.200 CURRENT SMOKER: ICD-10-CM

## 2019-02-22 DIAGNOSIS — M85.89 OSTEOPENIA OF MULTIPLE SITES: ICD-10-CM

## 2019-02-22 DIAGNOSIS — M54.59 MECHANICAL LOW BACK PAIN: ICD-10-CM

## 2019-02-22 DIAGNOSIS — Z87.891 PERSONAL HISTORY OF NICOTINE DEPENDENCE: ICD-10-CM

## 2019-02-22 DIAGNOSIS — R10.13 DYSPEPSIA: ICD-10-CM

## 2019-02-22 DIAGNOSIS — E78.2 MIXED HYPERLIPIDEMIA: Primary | ICD-10-CM

## 2019-02-22 DIAGNOSIS — Z00.00 HEALTHCARE MAINTENANCE: ICD-10-CM

## 2019-02-22 DIAGNOSIS — I25.10 CORONARY ARTERY CALCIFICATION SEEN ON CT SCAN: ICD-10-CM

## 2019-02-22 DIAGNOSIS — E11.9 TYPE 2 DIABETES MELLITUS WITHOUT COMPLICATION, WITHOUT LONG-TERM CURRENT USE OF INSULIN (HCC): ICD-10-CM

## 2019-02-22 DIAGNOSIS — I70.0 ATHEROSCLEROSIS OF AORTA (HCC): ICD-10-CM

## 2019-02-22 DIAGNOSIS — K21.9 GASTROESOPHAGEAL REFLUX DISEASE WITHOUT ESOPHAGITIS: ICD-10-CM

## 2019-02-22 DIAGNOSIS — I10 ESSENTIAL HYPERTENSION: ICD-10-CM

## 2019-02-22 DIAGNOSIS — M54.17 LUMBOSACRAL RADICULOPATHY AT S1: ICD-10-CM

## 2019-02-22 DIAGNOSIS — F41.8 ANXIETY ASSOCIATED WITH DEPRESSION: ICD-10-CM

## 2019-02-22 PROCEDURE — 99214 OFFICE O/P EST MOD 30 MIN: CPT | Performed by: GENERAL PRACTICE

## 2019-02-22 PROCEDURE — 36415 COLL VENOUS BLD VENIPUNCTURE: CPT | Performed by: GENERAL PRACTICE

## 2019-02-22 NOTE — PROGRESS NOTES
Subjective   Torin Granados is a 73 y.o. male.     History of Present Illness     Abdominal Pain  Returns with an approximate one month history of intermittent abdominal pain. Episodes have been occurring daily, most frequently in the morning. Episodes generally last until he takes an antiacid. The pain is described as a burning epigastric ache. This does not radiate and has been unassociated with any other symptoms. He denies any change in his appetite, difficulty swallowing, nausea, vomiting, change in his bowel habits, hematochezia or melena and there's no history of any fever, chills, or night sweats. He has lost 6 lbs over the last year. He recalls having similar pain years ago when under a lot of stress but denies any more then usual as of late    Diabetes  Current symptoms include numbness and tingling of the feet Patient denies visual disturbances, polydipsia, polyuria, hypoglycemia and foot ulcerations. Evaluation to date has been: hemoglobin A1C. Home sugars: have generally remained in the mid 100s fasting. Current treatments: metformin and GLP agonist - trulicity. He remains active but has not been following his diet as carefully through winter  Lab Results   Component Value Date    HGBA1C 8.50 (H) 02/14/2019     Dyslipidemia  Compliance with treatment has been good. He is currently being prescribed the following medication for his dyslipidemia - simvastatin and ezetimibe. Patient denies side effects associated with his medications.   Lab Results   Component Value Date    CHOL 146 02/09/2017    CHLPL 149 02/14/2019    TRIG 71 02/14/2019    HDL 57 (L) 02/14/2019    LDL 78 02/14/2019     Hypertension  Home blood pressure readings: not doing. Associated signs and symptoms: none. Patient denies: chest pain, palpitations, dyspnea, orthopnea, paroxysmal nocturnal dyspnea and peripheral edema. Current antihypertensive medications includes lisinopril. Medication compliance: taking as prescribed.   Lab Results    Component Value Date    CREATININE 0.84 02/14/2019     The following portions of the patient's history were reviewed and updated as appropriate: allergies, current medications, past medical history, past social history and problem list.    Review of Systems   Constitutional: Negative for appetite change, chills, fatigue, fever and unexpected weight change.   HENT: Negative for congestion, ear pain, rhinorrhea, sneezing, sore throat and voice change.    Eyes: Negative for visual disturbance.   Respiratory: Negative for cough, shortness of breath and wheezing.    Cardiovascular: Negative for chest pain, palpitations and leg swelling.   Gastrointestinal: Positive for abdominal pain. Negative for blood in stool, constipation, diarrhea, nausea and vomiting.   Endocrine: Negative for polydipsia and polyuria.   Genitourinary: Negative for difficulty urinating, dysuria, frequency, hematuria and urgency.   Musculoskeletal: Positive for arthralgias and back pain. Negative for joint swelling, myalgias and neck pain.   Skin: Negative for color change.   Neurological: Positive for numbness. Negative for tremors, weakness and headaches.   Psychiatric/Behavioral: Negative for dysphoric mood, sleep disturbance and suicidal ideas. The patient is not nervous/anxious.      Objective   Physical Exam   Constitutional: He is oriented to person, place, and time. He appears well-developed and well-nourished. He is cooperative. He does not have a sickly appearance. No distress.   Bright and in fair spirits. No apparent distress. No pallor, jaundice, diaphoresis, or cyanosis.   HENT:   Head: Atraumatic.   Right Ear: Tympanic membrane, external ear and ear canal normal.   Left Ear: Tympanic membrane, external ear and ear canal normal.   Mouth/Throat: Oropharynx is clear and moist.   Eyes: EOM are normal. Pupils are equal, round, and reactive to light. No scleral icterus.   Neck: No JVD present. Carotid bruit is not present. No tracheal  deviation present. No thyromegaly present.   Cardiovascular: Normal rate, regular rhythm, S1 normal, S2 normal, normal heart sounds and intact distal pulses. Exam reveals no gallop.   No murmur heard.  Pulmonary/Chest: Breath sounds normal. He has no wheezes. He has no rales.   Abdominal: Soft. Normal aorta and bowel sounds are normal. He exhibits no abdominal bruit and no mass. There is no hepatosplenomegaly. There is no tenderness. No hernia.   Musculoskeletal: He exhibits no deformity.     Vascular Status -  His right foot exhibits no edema. His left foot exhibits no edema.  Lymphadenopathy:        Head (right side): No submandibular adenopathy present.        Head (left side): No submandibular adenopathy present.     He has no cervical adenopathy.   Neurological: He is alert and oriented to person, place, and time. He has normal strength. A sensory deficit (decreased vibration sense both feet) is present. No cranial nerve deficit. He exhibits normal muscle tone. Coordination normal.   Skin: Skin is warm and dry. No rash noted. He is not diaphoretic. No pallor. Nails show no clubbing.   Psychiatric: He has a normal mood and affect. His behavior is normal. Thought content normal.     Assessment/Plan   Problems Addressed this Visit        Cardiovascular and Mediastinum    Atherosclerosis of aorta (CMS/HCC)  Reminded regarding risk factor modification with an emphasis on tobacco cessation.  Continue current medication    Essential hypertension   Hypertension: at goal. Evidence of target organ damage: atherosclerosis of the aorta and coronary artery calcifications on imaging.  Encouraged to continue to work on diet and exercise plan.   Continue current medication    Mixed hyperlipidemia  As above.   Continue current medication.    Coronary artery calcification seen on CT scan  As above.        Digestive    Gastroesophageal reflux disease without esophagitis    Dyspepsia  Advised to stop meloxicam for now  Continue  current medication  H pyrlori serology drawn  Encouraged to report if any worse, any new symptoms, or if not resolving over the next several weeks as a contrast CT of the abdomen and referral for EGD will then be made    Relevant Orders    H. Pylori Antibody, IgG (Completed)       Endocrine    Type 2 diabetes mellitus without complication, without long-term current use of insulin (CMS/[facility])  Diabetes mellitus Type II, under inadequate control.   Encouraged to continue to pursue ADA diet  Encouraged aerobic exercise.  Continue current medication  Updated labs will be drawn at return.       Nervous and Auditory    Mechanical low back pain    Lumbosacral radiculopathy at S1       Musculoskeletal and Integument    Osteopenia of multiple sites       Other    Anxiety associated with depression    Current smoker    Relevant Orders    CT Chest Low Dose Wo    Healthcare maintenance  Will arrange an updated low dose CT of the chest  Hep A #2 will be administered at his return    Relevant Orders    CT Chest Low Dose Wo

## 2019-02-23 VITALS
TEMPERATURE: 99.1 F | WEIGHT: 125 LBS | SYSTOLIC BLOOD PRESSURE: 125 MMHG | HEIGHT: 68 IN | OXYGEN SATURATION: 97 % | HEART RATE: 95 BPM | BODY MASS INDEX: 18.94 KG/M2 | DIASTOLIC BLOOD PRESSURE: 65 MMHG | RESPIRATION RATE: 12 BRPM

## 2019-02-23 LAB — H PYLORI IGG SER IA-ACNC: 0.37 INDEX VALUE (ref 0–0.79)

## 2019-03-04 DIAGNOSIS — E11.9 TYPE 2 DIABETES MELLITUS WITHOUT COMPLICATION, WITHOUT LONG-TERM CURRENT USE OF INSULIN (HCC): ICD-10-CM

## 2019-03-04 RX ORDER — DULAGLUTIDE 1.5 MG/.5ML
INJECTION, SOLUTION SUBCUTANEOUS
Qty: 2 ML | Refills: 5 | Status: SHIPPED | OUTPATIENT
Start: 2019-03-04 | End: 2019-09-16 | Stop reason: SDUPTHER

## 2019-03-21 ENCOUNTER — APPOINTMENT (OUTPATIENT)
Dept: CT IMAGING | Facility: HOSPITAL | Age: 74
End: 2019-03-21

## 2019-03-25 ENCOUNTER — HOSPITAL ENCOUNTER (OUTPATIENT)
Dept: CT IMAGING | Facility: HOSPITAL | Age: 74
Discharge: HOME | End: 2019-03-25
Admitting: GENERAL PRACTICE

## 2019-03-25 DIAGNOSIS — Z87.891 PERSONAL HISTORY OF NICOTINE DEPENDENCE: ICD-10-CM

## 2019-03-25 DIAGNOSIS — Z00.00 HEALTHCARE MAINTENANCE: ICD-10-CM

## 2019-03-25 DIAGNOSIS — F17.200 CURRENT SMOKER: ICD-10-CM

## 2019-03-25 PROCEDURE — G0297 LDCT FOR LUNG CA SCREEN: HCPCS

## 2019-03-25 PROCEDURE — G0297 LDCT FOR LUNG CA SCREEN: HCPCS | Performed by: RADIOLOGY

## 2019-04-11 DIAGNOSIS — M54.59 MECHANICAL LOW BACK PAIN: ICD-10-CM

## 2019-04-11 RX ORDER — TRAMADOL HYDROCHLORIDE 50 MG/1
TABLET ORAL
Qty: 360 TABLET | Refills: 1 | Status: SHIPPED | OUTPATIENT
Start: 2019-04-11 | End: 2020-02-17

## 2019-05-17 DIAGNOSIS — E11.9 TYPE 2 DIABETES MELLITUS WITHOUT COMPLICATION, WITHOUT LONG-TERM CURRENT USE OF INSULIN (HCC): ICD-10-CM

## 2019-05-30 ENCOUNTER — OFFICE VISIT (OUTPATIENT)
Dept: FAMILY MEDICINE CLINIC | Facility: CLINIC | Age: 74
End: 2019-05-30

## 2019-05-30 VITALS
HEART RATE: 80 BPM | OXYGEN SATURATION: 99 % | WEIGHT: 128 LBS | TEMPERATURE: 98.1 F | HEIGHT: 68 IN | BODY MASS INDEX: 19.4 KG/M2 | RESPIRATION RATE: 12 BRPM | SYSTOLIC BLOOD PRESSURE: 130 MMHG | DIASTOLIC BLOOD PRESSURE: 80 MMHG

## 2019-05-30 DIAGNOSIS — R10.13 DYSPEPSIA: ICD-10-CM

## 2019-05-30 DIAGNOSIS — Z00.00 HEALTHCARE MAINTENANCE: ICD-10-CM

## 2019-05-30 DIAGNOSIS — I70.0 ATHEROSCLEROSIS OF AORTA (HCC): ICD-10-CM

## 2019-05-30 DIAGNOSIS — F17.200 CURRENT SMOKER: ICD-10-CM

## 2019-05-30 DIAGNOSIS — E78.2 MIXED HYPERLIPIDEMIA: Primary | ICD-10-CM

## 2019-05-30 DIAGNOSIS — E29.1 ANDROGEN DEFICIENCY: ICD-10-CM

## 2019-05-30 DIAGNOSIS — K21.9 GASTROESOPHAGEAL REFLUX DISEASE WITHOUT ESOPHAGITIS: ICD-10-CM

## 2019-05-30 DIAGNOSIS — F41.8 ANXIETY ASSOCIATED WITH DEPRESSION: ICD-10-CM

## 2019-05-30 DIAGNOSIS — I25.10 CORONARY ARTERY CALCIFICATION SEEN ON CT SCAN: ICD-10-CM

## 2019-05-30 DIAGNOSIS — I10 ESSENTIAL HYPERTENSION: ICD-10-CM

## 2019-05-30 DIAGNOSIS — M85.89 OSTEOPENIA OF MULTIPLE SITES: ICD-10-CM

## 2019-05-30 DIAGNOSIS — E11.9 TYPE 2 DIABETES MELLITUS WITHOUT COMPLICATION, WITHOUT LONG-TERM CURRENT USE OF INSULIN (HCC): ICD-10-CM

## 2019-05-30 DIAGNOSIS — Z86.010 HISTORY OF COLONIC POLYPS: ICD-10-CM

## 2019-05-30 DIAGNOSIS — M54.59 MECHANICAL LOW BACK PAIN: ICD-10-CM

## 2019-05-30 PROCEDURE — 96160 PT-FOCUSED HLTH RISK ASSMT: CPT | Performed by: GENERAL PRACTICE

## 2019-05-30 PROCEDURE — G0439 PPPS, SUBSEQ VISIT: HCPCS | Performed by: GENERAL PRACTICE

## 2019-05-30 PROCEDURE — 99397 PER PM REEVAL EST PAT 65+ YR: CPT | Performed by: GENERAL PRACTICE

## 2019-05-30 RX ORDER — TESTOSTERONE 16.2 MG/G
2 GEL TRANSDERMAL DAILY
Qty: 225 G | Refills: 5 | Status: SHIPPED | OUTPATIENT
Start: 2019-05-30 | End: 2020-01-15

## 2019-05-30 NOTE — PATIENT INSTRUCTIONS
Heart Disease Prevention  Heart disease is a leading cause of death. There are many things you can do to help prevent heart disease.  Be physically active  Physical activity is good for your heart. It helps control your blood pressure, cholesterol levels, and weight. Try to be physically active every day. Ask your health care provider what activities are best for you.  Be a healthy weight  Extra weight can strain your heart and affect your blood pressure and cholesterol levels. Lose weight with diet and exercise if recommended by your health care provider.  Eat heart-healthy foods  Follow a healthy eating plan as recommended by your health care provider or dietitian. Heart-healthy foods include:  · High-fiber foods. These include oat bran, oatmeal, and whole-grain breads and cereals.  · Fruits and vegetables.    Avoid:  · Alcohol.  · Fried foods.  · Foods high in saturated fat. These include meats, butter, whole dairy products, shortening, and coconut or palm oil.  · Salty foods. These include canned food, luncheon meat, salty snacks, and fast food.    Keep your cholesterol levels under control  Cholesterol is a substance that is used for many important functions. When your cholesterol levels are high, cholesterol can stick to the insides of your blood vessels, making them narrow or clog. This can lead to chest pain (angina) and a heart attack.  Keep your cholesterol levels under control as recommended by your health care provider. Have your cholesterol checked at least once a year. Target cholesterol levels (in mg/dL) for most people are:  · Total cholesterol below 200.  · LDL cholesterol below 100.  · HDL cholesterol above 40 in men and above 50 in women.  · Triglycerides below 150.    Keep your blood pressure under control  Having high blood pressure (hypertension) puts you at risk for stroke and other forms of heart disease. Keep your blood pressure under control as recommended by your health care provider. Ask  your health care provider if you need treatment to lower your blood pressure. If you are 18-39 years of age, have your blood pressure checked every 3-5 years. If you are 40 years of age or older, have your blood pressure checked every year.  Do not use tobacco products  Tobacco smoke can damage your heart and blood vessels. Do not use any tobacco products including cigarettes, chewing tobacco, or electronic cigarettes. If you need help quitting, ask your health care provider.  Take medicines as directed  Take medicines only as directed by your health care provider. Ask your health care provider whether you should take an aspirin every day. Taking aspirin can help reduce your risk of heart disease and stroke.  Where to find more information:  To find out more about heart disease, visit the American Heart Association's website at www.americanheart.org  This information is not intended to replace advice given to you by your health care provider. Make sure you discuss any questions you have with your health care provider.  Document Released: 08/01/2005 Document Revised: 05/17/2017 Document Reviewed: 02/11/2015  [employer] Interactive Patient Education © 2019 [employer]      Smoking Tobacco Information, Adult  Smoking tobacco will very likely harm your health. Tobacco contains a poisonous (toxic), colorless chemical called nicotine. Nicotine affects the brain and makes tobacco addictive. This change in your brain can make it hard to stop smoking. Tobacco also has other toxic chemicals that can hurt your body and raise your risk of many cancers.  How can smoking tobacco affect me?  Smoking tobacco can increase your chances of having serious health conditions, such as:  · Cancer. Smoking is most commonly associated with lung cancer, but can lead to cancer in other parts of the body.  · Chronic obstructive pulmonary disease (COPD). This is a long-term lung condition that makes it hard to breathe. It also gets worse over  time.  · High blood pressure (hypertension), heart disease, stroke, or heart attack.  · Lung infections, such as pneumonia.  · Cataracts. This is when the lenses in the eyes become clouded.  · Digestive problems. This may include peptic ulcers, heartburn, and gastroesophageal reflux disease (GERD).  · Oral health problems, such as gum disease and tooth loss.  · Loss of taste and smell.    Smoking can affect your appearance by causing:  · Wrinkles.  · Yellow or stained teeth, fingers, and fingernails.    Smoking tobacco can also affect your social life.  · Many workplaces, restaurants, hotels, and public places are tobacco-free. This means that you may experience challenges in finding places to smoke when away from home.  · The cost of a smoking habit can be expensive. Expenses for someone who smokes come in two ways:  ? You spend money on a regular basis to buy tobacco.  ? Your health care costs in the long-term are higher if you smoke.  · Tobacco smoke can also affect the health of those around you. Children of smokers have greater chances of:  ? Sudden infant death syndrome (SIDS).  ? Ear infections.  ? Lung infections.    What lifestyle changes can be made?  · Do not start smoking. Quit if you already do.  · To quit smoking:  ? Make a plan to quit smoking and commit yourself to it. Look for programs to help you and ask your health care provider for recommendations and ideas.  ? Talk with your health care provider about using nicotine replacement medicines to help you quit. Medicine replacement medicines include gum, lozenges, patches, sprays, or pills.  ? Do not replace cigarette smoking with electronic cigarettes, which are commonly called e-cigarettes. The safety of e-cigarettes is not known, and some may contain harmful chemicals.  ? Avoid places, people, or situations that tempt you to smoke.  ? If you try to quit but return to smoking, don't give up hope. It is very common for people to try a number of times  before they fully succeed. When you feel ready again, give it another try.  · Quitting smoking might affect the way you eat as well as your weight. Be prepared to monitor your eating habits. Get support in planning and following a healthy diet.  · Ask your health care provider about having regular tests (screenings) to check for cancer. This may include blood tests, imaging tests, and other tests.  · Exercise regularly. Consider taking walks, joining a gym, or doing yoga or exercise classes.  · Develop skills to manage your stress. These skills include meditation.  What are the benefits of quitting smoking?  By quitting smoking, you may:  · Lower your risk of getting cancer and other diseases caused by smoking.  · Live longer.  · Breathe better.  · Lower your blood pressure and heart rate.  · Stop your addiction to tobacco.  · Stop creating secondhand smoke that hurts other people.  · Improve your sense of taste and smell.  · Look better over time, due to having fewer wrinkles and less staining.    What can happen if changes are not made?  If you do not stop smoking, you may:  · Get cancer and other diseases.  · Develop COPD or other long-term (chronic) lung conditions.  · Develop serious problems with your heart and blood vessels (cardiovascular system).  · Need more tests to screen for problems caused by smoking.  · Have higher, long-term healthcare costs from medicines or treatments related to smoking.  · Continue to have worsening changes in your lungs, mouth, and nose.    Where to find support  To get support to quit smoking, consider:  · Asking your health care provider for more information and resources.  · Taking classes to learn more about quitting smoking.  · Looking for local organizations that offer resources about quitting smoking.  · Joining a support group for people who want to quit smoking in your local community.    Where to find more information  You may find more information about quitting  smoking from:  · HelpGuide.org: www.helpguide.org/articles/addictions/how-to-quit-smoking.htm  · Smokefree.gov: smokefree.gov  · American Lung Association: www.lung.org    Contact a health care provider if:  · You have problems breathing.  · Your lips, nose, or fingers turn blue.  · You have chest pain.  · You are coughing up blood.  · You feel faint or you pass out.  · You have other noticeable changes that cause you to worry.  Summary  · Smoking tobacco can negatively affect your health, the health of those around you, your finances, and your social life.  · Do not start smoking. Quit if you already do. If you need help quitting, ask your health care provider.  · Think about joining a support group for people who want to quit smoking in your local community. There are many effective programs that will help you to quit this behavior.  This information is not intended to replace advice given to you by your health care provider. Make sure you discuss any questions you have with your health care provider.  Document Released: 01/02/2018 Document Revised: 01/02/2018 Document Reviewed: 01/02/2018  [employer] Interactive Patient Education © 2019 [employer]

## 2019-05-30 NOTE — PROGRESS NOTES
QUICK REFERENCE INFORMATION:  The ABCs of the Annual Wellness Visit    Subsequent Medicare Wellness Visit     HEALTH RISK ASSESSMENT    : 1945    Recent Hospitalizations:  No hospitalization(s) within the last year..    Current Medical Providers:  Patient Care Team:  Jan Dudley MD as PCP - General (Family Medicine)    Smoking Status:  Social History     Tobacco Use   Smoking Status Smoker   • Packs/day: 1.00   • Years: 40.00   • Pack years: 40.00   • Types: Cigarettes   • Last attempt to quit: 2016   • Years since quittin   Smokeless Tobacco Never Used   Tobacco Comment    estimated quit date       Alcohol Consumption:  Social History     Substance and Sexual Activity   Alcohol Use No       Depression Screen:   PHQ-2/PHQ-9 Depression Screening 2019   Little interest or pleasure in doing things 0   Feeling down, depressed, or hopeless 0   Trouble falling or staying asleep, or sleeping too much 0   Feeling tired or having little energy 0   Poor appetite or overeating 0   Feeling bad about yourself - or that you are a failure or have let yourself or your family down 0   Trouble concentrating on things, such as reading the newspaper or watching television 0   Moving or speaking so slowly that other people could have noticed. Or the opposite - being so fidgety or restless that you have been moving around a lot more than usual 0   Thoughts that you would be better off dead, or of hurting yourself in some way 0   Total Score 0   If you checked off any problems, how difficult have these problems made it for you to do your work, take care of things at home, or get along with other people? Not difficult at all       Health Habits and Functional and Cognitive Screening:  Functional & Cognitive Status 2019   Do you have difficulty preparing food and eating? No   Do you have difficulty bathing yourself, getting dressed or grooming yourself? No   Do you have difficulty using the toilet? No    Do you have difficulty moving around from place to place? No   Do you have trouble with steps or getting out of a bed or a chair? No   In the past year have you fallen or experienced a near fall? No   Current Diet Well Balanced Diet   Dental Exam Up to date   Eye Exam Up to date   Exercise (times per week) 0 times per week   Current Exercise Activities Include None   Do you need help using the phone?  No   Are you deaf or do you have serious difficulty hearing?  Yes   Do you need help with transportation? No   Do you need help shopping? No   Do you need help preparing meals?  No   Do you need help with housework?  No   Do you need help with laundry? No   Do you need help taking your medications? No   Do you need help managing money? No   Do you ever drive or ride in a car without wearing a seat belt? No   Have you felt unusual stress, anger or loneliness in the last month? No   Who do you live with? Alone   If you need help, do you have trouble finding someone available to you? No   Have you been bothered in the last four weeks by sexual problems? No   Do you have difficulty concentrating, remembering or making decisions? No     Does the patient have evidence of cognitive impairment? No    Asiprin use counseling: Taking ASA appropriately as indicated    Recent Lab Results:    Lab Results   Component Value Date     (H) 02/14/2019     Lab Results   Component Value Date    HGBA1C 8.50 (H) 02/14/2019     Lab Results   Component Value Date    CHOL 146 02/09/2017    TRIG 71 02/14/2019    HDL 57 (L) 02/14/2019    VLDL 14.2 02/14/2019    LDLHDL 1.20 02/09/2017     Age-appropriate Screening Schedule:  Refer to the list below for future screening recommendations based on patient's age, sex and/or medical conditions. Orders for these recommended tests are listed in the plan section. The patient has been provided with a written plan.    Health Maintenance   Topic Date Due   • INFLUENZA VACCINE  08/01/2019   •  HEMOGLOBIN A1C  08/14/2019   • DIABETIC FOOT EXAM  09/01/2019   • LIPID PANEL  02/14/2020   • URINE MICROALBUMIN  02/14/2020   • DXA SCAN  03/02/2020   • DIABETIC EYE EXAM  03/28/2020   • COLONOSCOPY  03/29/2022   • TDAP/TD VACCINES (2 - Td) 01/01/2024   • PNEUMOCOCCAL VACCINES (65+ LOW/MEDIUM RISK)  Completed   • ZOSTER VACCINE  Completed        Subjective   History of Present Illness    Torin Granados is a 73 y.o. male who presents for an Annual Wellness Visit.    Abdominal Pain  He continues to have intermittent abdominal pain. Episodes occur daily, most frequently in the morning. Episodes generally last until he takes an antiacid. The pain is described as a burning epigastric ache. This does not radiate and has been unassociated with any other symptoms. He denies any change in his appetite, difficulty swallowing, nausea, vomiting, change in his bowel habits, hematochezia or melena and there's no history of any fever, chills, or night sweats. He has regained 3 pounds since last here. He recalls having similar pain years ago when under a lot of stress but denies any more then usual as of late.  He is on no NSAIDs at present.  He is taking his lansoprazole as prescribed.  H. pylori antibodies drawn on 2/22/2019 were negative    Diabetes  Current symptoms include numbness and tingling of the feet Patient denies visual disturbances, polydipsia, polyuria, hypoglycemia and foot ulcerations. Evaluation to date has been: hemoglobin A1C. Home sugars: have generally remained in the mid 100s fasting. Current treatments: metformin and GLP agonist - trulicity. He remains quite active    Dyslipidemia  Compliance with treatment has been good. He is currently being prescribed the following medication for his dyslipidemia - simvastatin and ezetimibe. Patient denies side effects associated with his medications.     Hypertension  Home blood pressure readings: not doing. Associated signs and symptoms: none. Patient denies: chest  pain, palpitations, dyspnea, orthopnea, paroxysmal nocturnal dyspnea and peripheral edema. Current antihypertensive medications includes lisinopril. Medication compliance: taking as prescribed.     The following portions of the patient's history were reviewed and updated as appropriate: allergies, current medications, past family history, past medical history, past social history, past surgical history and problem list.    Outpatient Medications Prior to Visit   Medication Sig Dispense Refill   • ACCU-CHEK CLAUDIA PLUS test strip   5   • ACCU-CHEK SOFTCLIX LANCETS lancets   3   • Blood Glucose Monitoring Suppl (ACCU-CHEK CLAUDIA PLUS) W/DEVICE kit   0   • DULoxetine (CYMBALTA) 60 MG capsule TAKE 2 CAPSULES BY MOUTH DAILY. 180 capsule 3   • ezetimibe (ZETIA) 10 MG tablet TAKE ONE TABLET BY MOUTH EVERY DAY 90 tablet 3   • lansoprazole (PREVACID) 30 MG capsule TAKE 1 CAPSULE BY MOUTH DAILY. 90 capsule 3   • lisinopril (PRINIVIL,ZESTRIL) 10 MG tablet TAKE ONE TABLET BY MOUTH EVERY DAY 90 tablet 3   • metFORMIN (GLUCOPHAGE) 1000 MG tablet TAKE 1 TABLET BY MOUTH TWO TIMES A DAY WITH A MEAL 180 tablet 3   • simvastatin (ZOCOR) 20 MG tablet TAKE 1 TABLET BY MOUTH EVERY NIGHT. 90 tablet 3   • traMADol (ULTRAM) 50 MG tablet TAKE 1 TABLET BY MOUTH EVERY 6 HOURS AS NEEDED FOR MODERATE PAIN 360 tablet 1   • TRULICITY 1.5 MG/0.5ML solution pen-injector INJECT 1.5 MG UNDER THE SKIN 1 TIME PER WEEK 2 mL 5   • Zoster Vac Recomb Adjuvanted (SHINGRIX) 50 MCG reconstituted suspension Inject 50 mcg into the shoulder, thigh, or buttocks See Admin Instructions. Repeat in 2-6 months 1 each 1   • ANDROGEL PUMP 20.25 MG/ACT (1.62%) gel APPLY TWO PUMPS ON THE SKIN DAILY 225 g 5     No facility-administered medications prior to visit.        Patient Active Problem List   Diagnosis   • Gastroesophageal reflux disease without esophagitis   • Anxiety associated with depression   • Mechanical low back pain   • Atherosclerosis of aorta (CMS/HCC)   •  Current smoker   • Essential hypertension   • Mixed hyperlipidemia   • Type 2 diabetes mellitus without complication, without long-term current use of insulin (CMS/[facility])   • Androgen deficiency   • Healthcare maintenance   • History of colonic polyps   • Coronary artery calcification seen on CT scan   • Osteopenia of multiple sites   • Lumbosacral radiculopathy at S1   • Dyspepsia       Advance Care Planning:  Patient has an advance directive - a copy has not been provided. Have asked the patient to send this to us to add to record    Identification of Risk Factors:  Risk factors include: cardiovascular risk and tobacco use.    Review of Systems   Constitutional: Negative for appetite change, chills, fatigue, fever and unexpected weight change.   HENT: Negative for congestion, ear pain, rhinorrhea, sneezing, sore throat and voice change.    Eyes: Negative for visual disturbance.   Respiratory: Negative for cough, shortness of breath and wheezing.    Cardiovascular: Negative for chest pain, palpitations and leg swelling.   Gastrointestinal: Positive for abdominal pain. Negative for blood in stool, constipation, diarrhea, nausea and vomiting.   Endocrine: Negative for polydipsia and polyuria.   Genitourinary: Negative for difficulty urinating, dysuria, frequency, hematuria and urgency.   Musculoskeletal: Positive for arthralgias and back pain. Negative for joint swelling, myalgias and neck pain.   Skin: Negative for color change.   Neurological: Positive for numbness. Negative for tremors, weakness and headaches.   Psychiatric/Behavioral: Negative for dysphoric mood, sleep disturbance and suicidal ideas. The patient is not nervous/anxious.      Compared to one year ago, the patient feels his physical health is the same.  Compared to one year ago, the patient feels his mental health is the same.    Objective     Physical Exam   Constitutional: He is oriented to person, place, and time. He appears well-developed and  "well-nourished. He is cooperative. He does not have a sickly appearance. No distress.   Bright and in fair spirits. No apparent distress. No pallor, jaundice, diaphoresis, or cyanosis.   HENT:   Head: Atraumatic.   Right Ear: Tympanic membrane, external ear and ear canal normal.   Left Ear: Tympanic membrane, external ear and ear canal normal.   Mouth/Throat: Oropharynx is clear and moist.   Eyes: EOM are normal. Pupils are equal, round, and reactive to light. No scleral icterus.   Neck: No JVD present. Carotid bruit is not present. No tracheal deviation present. No thyromegaly present.   Cardiovascular: Normal rate, regular rhythm, S1 normal, S2 normal, normal heart sounds and intact distal pulses. Exam reveals no gallop.   No murmur heard.  Pulmonary/Chest: Breath sounds normal. He has no wheezes. He has no rales.   Abdominal: Soft. Normal aorta and bowel sounds are normal. He exhibits no abdominal bruit and no mass. There is no hepatosplenomegaly. There is no tenderness. No hernia.   Musculoskeletal: He exhibits no deformity.     Vascular Status -  His right foot exhibits no edema. His left foot exhibits no edema.  Lymphadenopathy:        Head (right side): No submandibular adenopathy present.        Head (left side): No submandibular adenopathy present.     He has no cervical adenopathy.   Neurological: He is alert and oriented to person, place, and time. He has normal strength. A sensory deficit (decreased vibration sense both feet) is present. No cranial nerve deficit. He exhibits normal muscle tone. Coordination normal.   Skin: Skin is warm and dry. No rash noted. He is not diaphoretic. No pallor. Nails show no clubbing.   Psychiatric: He has a normal mood and affect. His behavior is normal. Thought content normal.       Vitals:    05/30/19 1416   BP: 130/80   Pulse: 80   Resp: 12   Temp: 98.1 °F (36.7 °C)   TempSrc: Temporal   SpO2: 99%   Weight: 58.1 kg (128 lb)   Height: 172.7 cm (68\")       Patient's Body " mass index is 19.46 kg/m². BMI is below normal parameters. Recommendations include: continued monitoring.      Assessment/Plan   Patient Self-Management and Personalized Health Advice  The patient has been provided with information about: diet, exercise, prevention of cardiac or vascular disease and tobacco cessation and preventive services including:   · Exercise counseling provided, Nutrition counseling provided, hepatitis A #2.    Visit Diagnoses:    ICD-10-CM ICD-9-CM   1. Mixed hyperlipidemia  Encouraged to continue to work on his diet and exercise plan.  Continue current medication E78.2 272.2   2. Essential hypertension  As above.   Continue current medication. I10 401.9   3. Coronary artery calcification seen on CT scan  Reminded regarding risk factor modification with an emphasis on tobacco cessation.  Continue current medication I25.10 414.00   4. Atherosclerosis of aorta (CMS/HCC)  As above. I70.0 440.0   5. Gastroesophageal reflux disease without esophagitis  Reminded regarding lifestyle modification.  Continue current medication. K21.9 530.81   6. Type 2 diabetes mellitus without complication, without long-term current use of insulin (CMS/HCC)  Diabetes mellitus Type II, under inadequate control.   Encouraged to continue to pursue ADA diet  Encouraged aerobic exercise.  Continue current medication  Updated labs arranged E11.9 250.00   7. Mechanical low back pain M54.5 724.2   8. Osteopenia of multiple sites M85.89 733.90   9. History of colonic polyps Z86.010 V12.72   10. Healthcare maintenance  Hepatitis a #2 will be administered at return. Z00.00 V70.0   11. Current smoker F17.200 305.1   12. Anxiety associated with depression F41.8 300.4   13. Dyspepsia  Reminded to avoid NSAIDs and to continue his PPI  Scheduled for a CT of the abdomen and pelvis  We will arrange a surgery assessment with a view towards an EGD  Encouraged to report if any worse or if any new symptoms or concerns. R10.13 536.8   14.  Androgen deficiency E29.1 257.2       Orders Placed This Encounter   Procedures   • CT Abdomen Pelvis With Contrast     Standing Status:   Future     Standing Expiration Date:   5/30/2020     Order Specific Question:   Will Oral Contrast be needed for this procedure?     Answer:   Yes   • Comprehensive Metabolic Panel     Standing Status:   Future   • Hemoglobin A1c     Standing Status:   Future   • Testosterone (Free & Total), LC / MS     Standing Status:   Future     Standing Expiration Date:   5/30/2020   • Ambulatory Referral to General Surgery     Referral Priority:   Routine     Referral Type:   Consultation     Referral Reason:   Specialty Services Required     Referred to Provider:   Keny Hines MD     Requested Specialty:   General Surgery     Number of Visits Requested:   1       Outpatient Encounter Medications as of 5/30/2019   Medication Sig Dispense Refill   • ACCU-CHEK CLAUDIA PLUS test strip   5   • ACCU-CHEK SOFTCLIX LANCETS lancets   3   • Blood Glucose Monitoring Suppl (ACCU-CHEK CLAUDIA PLUS) W/DEVICE kit   0   • DULoxetine (CYMBALTA) 60 MG capsule TAKE 2 CAPSULES BY MOUTH DAILY. 180 capsule 3   • ezetimibe (ZETIA) 10 MG tablet TAKE ONE TABLET BY MOUTH EVERY DAY 90 tablet 3   • lansoprazole (PREVACID) 30 MG capsule TAKE 1 CAPSULE BY MOUTH DAILY. 90 capsule 3   • lisinopril (PRINIVIL,ZESTRIL) 10 MG tablet TAKE ONE TABLET BY MOUTH EVERY DAY 90 tablet 3   • metFORMIN (GLUCOPHAGE) 1000 MG tablet TAKE 1 TABLET BY MOUTH TWO TIMES A DAY WITH A MEAL 180 tablet 3   • simvastatin (ZOCOR) 20 MG tablet TAKE 1 TABLET BY MOUTH EVERY NIGHT. 90 tablet 3   • Testosterone (ANDROGEL PUMP) 20.25 MG/ACT (1.62%) gel Apply 2 Pump topically to the appropriate area as directed Daily. 225 g 5   • traMADol (ULTRAM) 50 MG tablet TAKE 1 TABLET BY MOUTH EVERY 6 HOURS AS NEEDED FOR MODERATE PAIN 360 tablet 1   • TRULICITY 1.5 MG/0.5ML solution pen-injector INJECT 1.5 MG UNDER THE SKIN 1 TIME PER WEEK 2 mL 5   • Zoster  Vac Recomb Adjuvanted (SHINGRIX) 50 MCG reconstituted suspension Inject 50 mcg into the shoulder, thigh, or buttocks See Admin Instructions. Repeat in 2-6 months 1 each 1   • [DISCONTINUED] ANDROGEL PUMP 20.25 MG/ACT (1.62%) gel APPLY TWO PUMPS ON THE SKIN DAILY 225 g 5     No facility-administered encounter medications on file as of 5/30/2019.        Reviewed use of high risk medication in the elderly: not applicable  Reviewed for potential of harmful drug interactions in the elderly: not applicable    Follow Up:  Return in about 3 months (around 8/30/2019).     An After Visit Summary and PPPS with all of these plans were given to the patient.

## 2019-06-04 ENCOUNTER — RESULTS ENCOUNTER (OUTPATIENT)
Dept: FAMILY MEDICINE CLINIC | Facility: CLINIC | Age: 74
End: 2019-06-04

## 2019-06-04 DIAGNOSIS — E29.1 ANDROGEN DEFICIENCY: ICD-10-CM

## 2019-06-19 ENCOUNTER — OFFICE VISIT (OUTPATIENT)
Dept: SURGERY | Facility: CLINIC | Age: 74
End: 2019-06-19

## 2019-06-19 VITALS
SYSTOLIC BLOOD PRESSURE: 153 MMHG | WEIGHT: 118 LBS | DIASTOLIC BLOOD PRESSURE: 68 MMHG | BODY MASS INDEX: 17.88 KG/M2 | HEIGHT: 68 IN

## 2019-06-19 DIAGNOSIS — R10.13 DYSPEPSIA: Primary | ICD-10-CM

## 2019-06-19 DIAGNOSIS — Z86.010 HISTORY OF COLONIC POLYPS: ICD-10-CM

## 2019-06-19 PROCEDURE — 99203 OFFICE O/P NEW LOW 30 MIN: CPT | Performed by: SURGERY

## 2019-06-21 NOTE — PROGRESS NOTES
Subjective   Torin Granados is a 73 y.o. male is being seen for consultation today at the request of Jan Dudley MD    Torin Granados is a 73 y.o. male With occasional dysphasia and reflux.  This is controlled with PPI therapy and he is scheduled for imaging next week.  He would like to defer surgical evaluation or EGD until imaging is performed.  No unintentional weight loss.        Past Medical History:   Diagnosis Date   • Anxiety associated with depression    • Arthritis    • Coronary artery calcification seen on CT scan 2017   • Depression    • Diabetes mellitus (CMS/HCC)    • Disease of thyroid gland    • Hypertension        Family History   Problem Relation Age of Onset   • Diabetes Mother    • Heart disease Mother    • Stroke Mother    • Diabetes Father    • Heart disease Father        Social History     Socioeconomic History   • Marital status:      Spouse name: emmie   • Number of children: 1   • Years of education: 20   • Highest education level: Not on file   Occupational History   • Occupation: retired   Tobacco Use   • Smoking status: Former Smoker     Packs/day: 1.00     Years: 40.00     Pack years: 40.00     Types: Cigarettes     Last attempt to quit: 2016     Years since quittin.9   • Smokeless tobacco: Never Used   • Tobacco comment: estimated quit date   Substance and Sexual Activity   • Alcohol use: No   • Drug use: No   • Sexual activity: Defer       Past Surgical History:   Procedure Laterality Date   • COLONOSCOPY      over 5years ago   • COLONOSCOPY N/A 3/29/2017    Procedure: Colonoscopy  CPTCODE: 92854;  Surgeon: Rudy Velez III, MD;  Location: [facility];  Service:    • FRACTURE SURGERY     • KNEE SURGERY         Review of Systems   Constitutional: Negative for activity change, appetite change, chills and fever.   HENT: Positive for trouble swallowing. Negative for sore throat.    Eyes: Negative for visual disturbance.   Respiratory: Negative for  "cough and shortness of breath.    Cardiovascular: Negative for chest pain and palpitations.   Gastrointestinal: Negative for abdominal distention, abdominal pain, blood in stool, constipation, diarrhea, nausea and vomiting.   Endocrine: Negative for cold intolerance and heat intolerance.   Genitourinary: Negative for dysuria.   Musculoskeletal: Negative for joint swelling.   Skin: Negative for color change, rash and wound.   Allergic/Immunologic: Negative for immunocompromised state.   Neurological: Negative for dizziness, seizures, weakness and headaches.   Hematological: Negative for adenopathy. Does not bruise/bleed easily.   Psychiatric/Behavioral: Negative for agitation and confusion.         /68   Ht 172.7 cm (68\")   Wt 53.5 kg (118 lb)   BMI 17.94 kg/m²   Objective   Physical Exam   Constitutional: He is oriented to person, place, and time. He appears well-developed.   HENT:   Head: Normocephalic and atraumatic.   Mouth/Throat: Mucous membranes are normal.   Eyes: Conjunctivae are normal. Pupils are equal, round, and reactive to light.   Neck: Neck supple. No JVD present. No tracheal deviation present. No thyromegaly present.   Cardiovascular: Normal rate and regular rhythm. Exam reveals no gallop and no friction rub.   No murmur heard.  Pulmonary/Chest: Effort normal and breath sounds normal.   Abdominal: Soft. He exhibits no distension. There is no splenomegaly or hepatomegaly. There is no tenderness. No hernia.   Musculoskeletal: Normal range of motion. He exhibits no deformity.   Neurological: He is alert and oriented to person, place, and time.   Skin: Skin is warm and dry.   Psychiatric: He has a normal mood and affect.             Assessment   Torin was seen today for difficulty swallowing.    Diagnoses and all orders for this visit:    Dyspepsia    History of colonic polyps      Torin Granados is a 73 y.o. male with dysphasia of unknown etiology.  CT the abdomen pelvis is scheduled and he " will follow-up after imaging.He is aware of symptoms that should prompt return to care and will continue PPI therapy.     Patient's Body mass index is 17.94 kg/m². BMI is within normal parameters. No follow-up required..    I advised Edward of the risks of continuing to use tobacco, and I provided him with tobacco cessation educational materials in the After Visit Summary.     During this visit, I spent   minutes counseling the patient regarding tobacco cessation.

## 2019-06-24 ENCOUNTER — HOSPITAL ENCOUNTER (OUTPATIENT)
Dept: CT IMAGING | Facility: HOSPITAL | Age: 74
Discharge: HOME | End: 2019-06-24
Admitting: GENERAL PRACTICE

## 2019-06-24 DIAGNOSIS — R10.13 DYSPEPSIA: ICD-10-CM

## 2019-06-24 LAB — CREAT BLDA-MCNC: 0.9 MG/DL (ref 0.6–1.3)

## 2019-06-24 PROCEDURE — 0 IOVERSOL 68 % SOLUTION: Performed by: GENERAL PRACTICE

## 2019-06-24 PROCEDURE — 74177 CT ABD & PELVIS W/CONTRAST: CPT

## 2019-06-24 PROCEDURE — 74177 CT ABD & PELVIS W/CONTRAST: CPT | Performed by: RADIOLOGY

## 2019-06-24 PROCEDURE — 82565 ASSAY OF CREATININE: CPT

## 2019-06-24 RX ADMIN — IOVERSOL 80 ML: 678 INJECTION INTRA-ARTERIAL; INTRAVENOUS at 10:18

## 2019-06-26 ENCOUNTER — PRIOR AUTHORIZATION (OUTPATIENT)
Dept: FAMILY MEDICINE CLINIC | Facility: CLINIC | Age: 74
End: 2019-06-26

## 2019-06-26 NOTE — PROGRESS NOTES
Spoke with pt about the following per Dr. Dudley.     -- Please let patient know that his CT did not show a clear cause for his abdominal pain -he should follow-up with his appointment with Dr. Hines with a view toward endoscopy

## 2019-07-03 ENCOUNTER — LAB (OUTPATIENT)
Dept: LAB | Facility: HOSPITAL | Age: 74
End: 2019-07-03

## 2019-07-03 ENCOUNTER — OFFICE VISIT (OUTPATIENT)
Dept: SURGERY | Facility: CLINIC | Age: 74
End: 2019-07-03

## 2019-07-03 VITALS — BODY MASS INDEX: 17.88 KG/M2 | HEIGHT: 68 IN | WEIGHT: 118 LBS

## 2019-07-03 DIAGNOSIS — E11.9 TYPE 2 DIABETES MELLITUS WITHOUT COMPLICATION, WITHOUT LONG-TERM CURRENT USE OF INSULIN (HCC): ICD-10-CM

## 2019-07-03 DIAGNOSIS — K21.9 GASTROESOPHAGEAL REFLUX DISEASE WITHOUT ESOPHAGITIS: Primary | ICD-10-CM

## 2019-07-03 DIAGNOSIS — R10.13 DYSPEPSIA: ICD-10-CM

## 2019-07-03 LAB
ALBUMIN SERPL-MCNC: 4.7 G/DL (ref 3.5–5.2)
ALBUMIN/GLOB SERPL: 2 G/DL
ALP SERPL-CCNC: 69 U/L (ref 39–117)
ALT SERPL W P-5'-P-CCNC: 14 U/L (ref 1–41)
ANION GAP SERPL CALCULATED.3IONS-SCNC: 12.4 MMOL/L (ref 5–15)
AST SERPL-CCNC: 15 U/L (ref 1–40)
BILIRUB SERPL-MCNC: 0.4 MG/DL (ref 0.2–1.2)
BUN BLD-MCNC: 9 MG/DL (ref 8–23)
BUN/CREAT SERPL: 10.6 (ref 7–25)
CALCIUM SPEC-SCNC: 9.7 MG/DL (ref 8.6–10.5)
CHLORIDE SERPL-SCNC: 99 MMOL/L (ref 98–107)
CO2 SERPL-SCNC: 25.6 MMOL/L (ref 22–29)
CREAT BLD-MCNC: 0.85 MG/DL (ref 0.76–1.27)
GFR SERPL CREATININE-BSD FRML MDRD: 89 ML/MIN/1.73
GLOBULIN UR ELPH-MCNC: 2.4 GM/DL
GLUCOSE BLD-MCNC: 180 MG/DL (ref 65–99)
HBA1C MFR BLD: 8.04 % (ref 4.8–5.6)
POTASSIUM BLD-SCNC: 5.1 MMOL/L (ref 3.5–5.2)
PROT SERPL-MCNC: 7.1 G/DL (ref 6–8.5)
SODIUM BLD-SCNC: 137 MMOL/L (ref 136–145)

## 2019-07-03 PROCEDURE — 36415 COLL VENOUS BLD VENIPUNCTURE: CPT | Performed by: GENERAL PRACTICE

## 2019-07-03 PROCEDURE — 84402 ASSAY OF FREE TESTOSTERONE: CPT | Performed by: GENERAL PRACTICE

## 2019-07-03 PROCEDURE — 99213 OFFICE O/P EST LOW 20 MIN: CPT | Performed by: SURGERY

## 2019-07-03 PROCEDURE — 84403 ASSAY OF TOTAL TESTOSTERONE: CPT | Performed by: GENERAL PRACTICE

## 2019-07-03 PROCEDURE — 80053 COMPREHEN METABOLIC PANEL: CPT

## 2019-07-03 PROCEDURE — 83036 HEMOGLOBIN GLYCOSYLATED A1C: CPT

## 2019-07-04 NOTE — PROGRESS NOTES
Subjective   Torin Granados is a 73 y.o. male is being seen for consultation today at the request of Jan Dudley MD    Torin Granados is a 73 y.o. male For reflux and abdominal pain.  The patient symptoms have improved with PPI therapy.  CT scan performed shows a possible area of colonic thickening and the patient has outpatient colonoscopy follow-up scheduled at this time.  No current abdominal pain or surgical intervention required.        Past Medical History:   Diagnosis Date   • Anxiety associated with depression    • Arthritis    • Coronary artery calcification seen on CT scan 6/8/2017   • Depression    • Diabetes mellitus (CMS/HCC)    • Disease of thyroid gland    • Hypertension        Family History   Problem Relation Age of Onset   • Diabetes Mother    • Heart disease Mother    • Stroke Mother    • Diabetes Father    • Heart disease Father        Social History     Socioeconomic History   • Marital status:      Spouse name: emmie   • Number of children: 1   • Years of education: 20   • Highest education level: Not on file   Occupational History   • Occupation: retired   Tobacco Use   • Smoking status: Former Smoker     Packs/day: 1.00     Years: 40.00     Pack years: 40.00     Types: Cigarettes     Last attempt to quit: 7/1/2016     Years since quitting: 3.0   • Smokeless tobacco: Never Used   • Tobacco comment: estimated quit date   Substance and Sexual Activity   • Alcohol use: No   • Drug use: No   • Sexual activity: Defer       Past Surgical History:   Procedure Laterality Date   • COLONOSCOPY      over 5years ago   • COLONOSCOPY N/A 3/29/2017    Procedure: Colonoscopy  CPTCODE: 80816;  Surgeon: Rudy Velez III, MD;  Location: [facility];  Service:    • FRACTURE SURGERY     • KNEE SURGERY         Review of Systems   Constitutional: Negative for activity change, appetite change, chills and fever.   HENT: Negative for sore throat and trouble swallowing.    Eyes: Negative for  "visual disturbance.   Respiratory: Negative for cough and shortness of breath.    Cardiovascular: Negative for chest pain and palpitations.   Gastrointestinal: Negative for abdominal distention, abdominal pain, blood in stool, constipation, diarrhea, nausea and vomiting.   Endocrine: Negative for cold intolerance and heat intolerance.   Genitourinary: Negative for dysuria.   Musculoskeletal: Negative for joint swelling.   Skin: Negative for color change, rash and wound.   Allergic/Immunologic: Negative for immunocompromised state.   Neurological: Negative for dizziness, seizures, weakness and headaches.   Hematological: Negative for adenopathy. Does not bruise/bleed easily.   Psychiatric/Behavioral: Negative for agitation and confusion.         Ht 172.7 cm (68\")   Wt 53.5 kg (118 lb)   BMI 17.94 kg/m²   Objective   Physical Exam   Constitutional: He is oriented to person, place, and time. He appears well-developed.   HENT:   Head: Normocephalic and atraumatic.   Mouth/Throat: Mucous membranes are normal.   Eyes: Conjunctivae are normal. Pupils are equal, round, and reactive to light.   Neck: Neck supple. No JVD present. No tracheal deviation present. No thyromegaly present.   Cardiovascular: Normal rate and regular rhythm. Exam reveals no gallop and no friction rub.   No murmur heard.  Pulmonary/Chest: Effort normal and breath sounds normal.   Abdominal: Soft. He exhibits no distension. There is no splenomegaly or hepatomegaly. There is no tenderness. No hernia.   Musculoskeletal: Normal range of motion. He exhibits no deformity.   Neurological: He is alert and oriented to person, place, and time.   Skin: Skin is warm and dry.   Psychiatric: He has a normal mood and affect.             Assessment   Torin was seen today for dyspepsia.    Diagnoses and all orders for this visit:    Gastroesophageal reflux disease without esophagitis      Torin Granados is a 73 y.o. male with dyspepsia improving with PPI therapy.  " CT the abdomen pelvis shows no other surgical abnormalities however the patient does have an area of the colon that appears to have an area of narrowing and thickening that will require colonoscopy for which she has outpatient follow-up.    Patient's Body mass index is 17.94 kg/m². BMI is within normal parameters. No follow-up required..

## 2019-07-08 LAB
TESTOST FREE SERPL-MCNC: 3.6 PG/ML (ref 6.6–18.1)
TESTOST SERPL-MCNC: 390.7 NG/DL (ref 264–916)

## 2019-07-26 DIAGNOSIS — E78.49 OTHER HYPERLIPIDEMIA: ICD-10-CM

## 2019-07-26 RX ORDER — EZETIMIBE 10 MG/1
TABLET ORAL
Qty: 90 TABLET | Refills: 3 | Status: SHIPPED | OUTPATIENT
Start: 2019-07-26 | End: 2020-10-05

## 2019-09-16 DIAGNOSIS — E11.9 TYPE 2 DIABETES MELLITUS WITHOUT COMPLICATION, WITHOUT LONG-TERM CURRENT USE OF INSULIN (HCC): ICD-10-CM

## 2019-09-16 RX ORDER — DULAGLUTIDE 1.5 MG/.5ML
INJECTION, SOLUTION SUBCUTANEOUS
Qty: 2 ML | Refills: 5 | Status: SHIPPED | OUTPATIENT
Start: 2019-09-16 | End: 2020-02-03

## 2019-09-17 ENCOUNTER — OFFICE VISIT (OUTPATIENT)
Dept: FAMILY MEDICINE CLINIC | Facility: CLINIC | Age: 74
End: 2019-09-17

## 2019-09-17 DIAGNOSIS — M85.89 OSTEOPENIA OF MULTIPLE SITES: ICD-10-CM

## 2019-09-17 DIAGNOSIS — Z23 ENCOUNTER FOR IMMUNIZATION: ICD-10-CM

## 2019-09-17 DIAGNOSIS — Z00.00 HEALTHCARE MAINTENANCE: ICD-10-CM

## 2019-09-17 DIAGNOSIS — E78.2 MIXED HYPERLIPIDEMIA: Primary | ICD-10-CM

## 2019-09-17 DIAGNOSIS — E29.1 ANDROGEN DEFICIENCY: ICD-10-CM

## 2019-09-17 DIAGNOSIS — M54.59 MECHANICAL LOW BACK PAIN: ICD-10-CM

## 2019-09-17 DIAGNOSIS — I10 ESSENTIAL HYPERTENSION: ICD-10-CM

## 2019-09-17 DIAGNOSIS — J44.9 MIXED TYPE COPD (CHRONIC OBSTRUCTIVE PULMONARY DISEASE) (HCC): ICD-10-CM

## 2019-09-17 DIAGNOSIS — K21.9 GASTROESOPHAGEAL REFLUX DISEASE WITHOUT ESOPHAGITIS: ICD-10-CM

## 2019-09-17 DIAGNOSIS — F41.8 ANXIETY ASSOCIATED WITH DEPRESSION: ICD-10-CM

## 2019-09-17 DIAGNOSIS — E11.9 TYPE 2 DIABETES MELLITUS WITHOUT COMPLICATION, WITHOUT LONG-TERM CURRENT USE OF INSULIN (HCC): ICD-10-CM

## 2019-09-17 DIAGNOSIS — R10.13 DYSPEPSIA: ICD-10-CM

## 2019-09-17 DIAGNOSIS — I70.0 ATHEROSCLEROSIS OF AORTA (HCC): ICD-10-CM

## 2019-09-17 DIAGNOSIS — I25.10 CORONARY ARTERY CALCIFICATION SEEN ON CT SCAN: ICD-10-CM

## 2019-09-17 DIAGNOSIS — F17.200 CURRENT SMOKER: ICD-10-CM

## 2019-09-17 PROCEDURE — 99214 OFFICE O/P EST MOD 30 MIN: CPT | Performed by: GENERAL PRACTICE

## 2019-09-17 PROCEDURE — G0008 ADMIN INFLUENZA VIRUS VAC: HCPCS | Performed by: GENERAL PRACTICE

## 2019-09-17 PROCEDURE — 90674 CCIIV4 VAC NO PRSV 0.5 ML IM: CPT | Performed by: GENERAL PRACTICE

## 2019-09-17 RX ORDER — ALBUTEROL SULFATE 90 UG/1
2 AEROSOL, METERED RESPIRATORY (INHALATION) EVERY 4 HOURS PRN
Qty: 1 INHALER | Refills: 0 | Status: SHIPPED | OUTPATIENT
Start: 2019-09-17 | End: 2020-02-17

## 2019-09-17 RX ORDER — DOXYCYCLINE HYCLATE 100 MG/1
100 CAPSULE ORAL 2 TIMES DAILY
Qty: 20 CAPSULE | Refills: 0 | Status: SHIPPED | OUTPATIENT
Start: 2019-09-17 | End: 2019-09-27

## 2019-09-17 RX ORDER — MONTELUKAST SODIUM 10 MG/1
10 TABLET ORAL DAILY
Qty: 30 TABLET | Refills: 5 | Status: SHIPPED | OUTPATIENT
Start: 2019-09-17 | End: 2020-01-13 | Stop reason: SDUPTHER

## 2019-09-17 NOTE — PROGRESS NOTES
Subjective   Torin Granados is a 73 y.o. male.     History of Present Illness     Cough  Returns with a 2 to 3 week history of sneezing, nasal congestion, postnasal drip, cough productive of whitish-yellowish sputum, and mild shortness of breath with exertion.  There is no history of any other upper respiratory tract symptoms and he denies any chest pain or hemoptysis.  There is no history of any fever, chills, or night sweats.  Unfortunately he continues to smoke 1 pack/day.  Low-dose CT of the chest performed on 3/25/2019 was reported as showing a number of small, stable, benign-appearing nodules, moderate COPD, granulomatous changes of the right hilum and lung, mild coronary artery calcifications, and cholelithiasis.    Abdominal Pain  Seen by Dr. burroughs since last here.  Was advised to take tramadol only when absolutely needed and with this he has had little or no further abdominal pain. He continues to deny any change in his appetite, difficulty swallowing, nausea, vomiting, change in his bowel habits, hematochezia or melena and there's no history of any fever, chills, or night sweats. He has largely regained the weight he had lost over the last year.  Contrast CT of the abdomen and pelvis performed on 6/24/2019 was reported as showing moderate constipation, diverticulosis of the splenic and descending colon, focal segment of narrowing of the splenic flexure felt to be most likely due to spasm or incomplete distention, cholelithiasis, a small fat-containing right inguinal hernia, prostatic enlargement with central calcifications, and moderate to severe atherosclerosis.  Colonoscopy performed on 3/29/2017 revealed sigmoid diverticulosis but was otherwise unremarkable.    Diabetes  Current symptoms include numbness and tingling of the feet Patient denies visual disturbances, polydipsia, polyuria, hypoglycemia and foot ulcerations. Evaluation to date has been: hemoglobin A1C. Home sugars: have generally remained in  the mid 100s fasting. Current treatments: metformin and GLP agonist - trulicity. He remains active but admits that there is still room for improvement in his diet  Lab Results   Component Value Date    HGBA1C 8.04 (H) 07/03/2019     Dyslipidemia  Compliance with treatment has been good. He is currently being prescribed the following medication for his dyslipidemia - simvastatin and ezetimibe. Patient denies side effects associated with his medications.     Hypertension  Home blood pressure readings: not doing. Associated signs and symptoms: none. Patient denies: chest pain, palpitations, dyspnea, orthopnea, paroxysmal nocturnal dyspnea and peripheral edema. Current antihypertensive medications includes lisinopril. Medication compliance: taking as prescribed.   Lab Results   Component Value Date    CREATININE 0.85 07/03/2019     Lab Results   Component Value Date    K 5.1 07/03/2019     The following portions of the patient's history were reviewed and updated as appropriate: allergies, current medications, past medical history, past social history and problem list.    Review of Systems   Constitutional: Negative for appetite change, chills, fatigue, fever and unexpected weight change.   HENT: Positive for congestion, postnasal drip, rhinorrhea and sneezing. Negative for ear pain, sore throat and voice change.    Eyes: Negative for visual disturbance.   Respiratory: Positive for cough and shortness of breath. Negative for wheezing.    Cardiovascular: Negative for chest pain, palpitations and leg swelling.   Gastrointestinal: Negative for abdominal pain, blood in stool, constipation, diarrhea, nausea and vomiting.   Endocrine: Negative for polydipsia and polyuria.   Genitourinary: Negative for difficulty urinating, dysuria, frequency, hematuria and urgency.   Musculoskeletal: Positive for arthralgias and back pain. Negative for joint swelling, myalgias and neck pain.   Skin: Negative for color change.   Neurological:  Positive for numbness. Negative for tremors, weakness and headaches.   Psychiatric/Behavioral: Negative for dysphoric mood, sleep disturbance and suicidal ideas. The patient is not nervous/anxious.      Objective   Physical Exam   Constitutional: He is oriented to person, place, and time. He appears well-developed and well-nourished. He is cooperative. He does not have a sickly appearance. No distress.   Bright and in fair spirits. No apparent distress. No pallor, jaundice, diaphoresis, or cyanosis.   HENT:   Head: Atraumatic.   Right Ear: Tympanic membrane, external ear and ear canal normal.   Left Ear: Tympanic membrane, external ear and ear canal normal.   Mouth/Throat: Oropharynx is clear and moist.   Eyes: EOM are normal. Pupils are equal, round, and reactive to light. No scleral icterus.   Neck: No JVD present. Carotid bruit is not present. No tracheal deviation present. No thyromegaly present.   Cardiovascular: Normal rate, regular rhythm, S1 normal, S2 normal, normal heart sounds and intact distal pulses. Exam reveals no gallop.   No murmur heard.  Pulmonary/Chest: He has wheezes (expiration somewhat prolonged on forced vital capacity). He has no rales.   Abdominal: Soft. Normal aorta and bowel sounds are normal. He exhibits no abdominal bruit and no mass. There is no hepatosplenomegaly. There is no tenderness. No hernia.   Musculoskeletal: He exhibits no deformity.     Vascular Status -  His right foot exhibits no edema. His left foot exhibits no edema.  Lymphadenopathy:        Head (right side): No submandibular adenopathy present.        Head (left side): No submandibular adenopathy present.     He has no cervical adenopathy.   Neurological: He is alert and oriented to person, place, and time. He has normal strength. A sensory deficit (decreased vibration sense both feet) is present. No cranial nerve deficit. He exhibits normal muscle tone. Coordination normal.   Skin: Skin is warm and dry. No rash noted.  He is not diaphoretic. No pallor. Nails show no clubbing.   Psychiatric: He has a normal mood and affect. His behavior is normal. Thought content normal.     Assessment/Plan   Problems Addressed this Visit        Cardiovascular and Mediastinum    Atherosclerosis of aorta (CMS/[facility])  Reminded regarding risk factor modification with an emphasis on tobacco cessation.  Continue current medication    Essential hypertension   Hypertension: at goal. Evidence of target organ damage: atherosclerosis of the aorta and carotid arteries on imaging.  Encouraged to continue to work on diet and exercise plan.   Continue current medication  Scheduled for updated labs just prior to his return    Relevant Orders    CBC & Differential    Comprehensive Metabolic Panel    Mixed hyperlipidemia   As above.   Continue current medication.    Relevant Orders    Lipid Panel    TSH    Coronary artery calcification seen on CT scan       Respiratory    Mixed type COPD (chronic obstructive pulmonary disease) (CMS/[facility])  With recent exacerbation likely associated with allergic rhinitis and/or a URTI  Reminded of the importance of smoking cessation  Trial of montelukast along with a short course of doxycycline, breo, and albuterol as needed  Encouraged to report if any worse, any new symptoms, or if not resolving over the next several weeks    Relevant Medications    doxycycline (VIBRAMYCIN) 100 MG capsule    albuterol sulfate  (90 Base) MCG/ACT inhaler  breo 200    montelukast (SINGULAIR) 10 MG tablet       Digestive    Gastroesophageal reflux disease without esophagitis    Dyspepsia  Resolved  Patient aware of the results of his CT uninterested in an updated colonoscopy at present but will report if he should experience any new symptoms       Endocrine    Type 2 diabetes mellitus without complication, without long-term current use of insulin (CMS/[facility])  Diabetes mellitus Type II, under inadequate control.   Encouraged to continue to pursue ADA  diet  Encouraged aerobic exercise.  Continue current medication    Relevant Orders    Hemoglobin A1c    MicroAlbumin, Urine, Random - Urine, Clean Catch    Androgen deficiency       Nervous and Auditory    Mechanical low back pain       Musculoskeletal and Integument    Osteopenia of multiple sites  Encouraged to continue to pursue weight bearing activities while exercising joint protection.    Relevant Orders    Vitamin D 25 Hydroxy       Other    Anxiety associated with depression    Current smoker    Healthcare maintenance  Recommended a flu shot  Prescription written for hepatitis A #2.    Relevant Orders    Flucelvax Quad=>4Years (PFS) (Completed)

## 2019-09-18 VITALS
TEMPERATURE: 98.9 F | BODY MASS INDEX: 19.7 KG/M2 | WEIGHT: 130 LBS | DIASTOLIC BLOOD PRESSURE: 70 MMHG | SYSTOLIC BLOOD PRESSURE: 120 MMHG | OXYGEN SATURATION: 91 % | HEIGHT: 68 IN | RESPIRATION RATE: 12 BRPM | HEART RATE: 88 BPM

## 2019-09-25 DIAGNOSIS — E78.49 OTHER HYPERLIPIDEMIA: ICD-10-CM

## 2019-09-25 RX ORDER — SIMVASTATIN 20 MG
20 TABLET ORAL NIGHTLY
Qty: 90 TABLET | Refills: 3 | Status: SHIPPED | OUTPATIENT
Start: 2019-09-25 | End: 2020-07-08

## 2019-10-14 DIAGNOSIS — I10 ESSENTIAL HYPERTENSION: ICD-10-CM

## 2019-10-14 RX ORDER — LISINOPRIL 10 MG/1
TABLET ORAL
Qty: 90 TABLET | Refills: 3 | Status: SHIPPED | OUTPATIENT
Start: 2019-10-14 | End: 2020-10-05

## 2019-11-18 DIAGNOSIS — K21.9 GASTROESOPHAGEAL REFLUX DISEASE WITHOUT ESOPHAGITIS: ICD-10-CM

## 2019-11-18 RX ORDER — LANSOPRAZOLE 30 MG/1
CAPSULE, DELAYED RELEASE ORAL
Qty: 90 CAPSULE | Refills: 3 | Status: SHIPPED | OUTPATIENT
Start: 2019-11-18 | End: 2020-12-29

## 2019-12-16 DIAGNOSIS — F41.8 ANXIETY ASSOCIATED WITH DEPRESSION: ICD-10-CM

## 2019-12-16 RX ORDER — DULOXETIN HYDROCHLORIDE 60 MG/1
CAPSULE, DELAYED RELEASE ORAL
Qty: 180 CAPSULE | Refills: 3 | Status: SHIPPED | OUTPATIENT
Start: 2019-12-16 | End: 2020-10-05

## 2020-01-13 DIAGNOSIS — J44.9 MIXED TYPE COPD (CHRONIC OBSTRUCTIVE PULMONARY DISEASE) (HCC): ICD-10-CM

## 2020-01-13 RX ORDER — MONTELUKAST SODIUM 10 MG/1
10 TABLET ORAL DAILY
Qty: 90 TABLET | Refills: 3 | Status: SHIPPED | OUTPATIENT
Start: 2020-01-13 | End: 2020-12-29

## 2020-01-15 DIAGNOSIS — E29.1 ANDROGEN DEFICIENCY: ICD-10-CM

## 2020-01-15 RX ORDER — TESTOSTERONE 16.2 MG/G
GEL TRANSDERMAL
Qty: 225 G | Refills: 5 | Status: SHIPPED | OUTPATIENT
Start: 2020-01-15 | End: 2022-05-31

## 2020-01-20 ENCOUNTER — LAB (OUTPATIENT)
Dept: FAMILY MEDICINE CLINIC | Facility: CLINIC | Age: 75
End: 2020-01-20

## 2020-01-20 DIAGNOSIS — E78.2 MIXED HYPERLIPIDEMIA: ICD-10-CM

## 2020-01-20 DIAGNOSIS — I10 ESSENTIAL HYPERTENSION: ICD-10-CM

## 2020-01-20 DIAGNOSIS — E11.9 TYPE 2 DIABETES MELLITUS WITHOUT COMPLICATION, WITHOUT LONG-TERM CURRENT USE OF INSULIN (HCC): ICD-10-CM

## 2020-01-20 DIAGNOSIS — M85.89 OSTEOPENIA OF MULTIPLE SITES: ICD-10-CM

## 2020-01-20 PROCEDURE — 36415 COLL VENOUS BLD VENIPUNCTURE: CPT | Performed by: GENERAL PRACTICE

## 2020-01-21 LAB
25(OH)D3+25(OH)D2 SERPL-MCNC: 44.3 NG/ML (ref 30–100)
ALBUMIN SERPL-MCNC: 4.7 G/DL (ref 3.5–5.2)
ALBUMIN/GLOB SERPL: 2.8 G/DL
ALP SERPL-CCNC: 65 U/L (ref 39–117)
ALT SERPL-CCNC: 20 U/L (ref 1–41)
AST SERPL-CCNC: 21 U/L (ref 1–40)
BASOPHILS # BLD AUTO: 0.02 10*3/MM3 (ref 0–0.2)
BASOPHILS NFR BLD AUTO: 0.3 % (ref 0–1.5)
BILIRUB SERPL-MCNC: 0.4 MG/DL (ref 0.2–1.2)
BUN SERPL-MCNC: 11 MG/DL (ref 8–23)
BUN/CREAT SERPL: 13.4 (ref 7–25)
CALCIUM SERPL-MCNC: 9.1 MG/DL (ref 8.6–10.5)
CHLORIDE SERPL-SCNC: 92 MMOL/L (ref 98–107)
CHOLEST SERPL-MCNC: 141 MG/DL (ref 0–200)
CO2 SERPL-SCNC: 27 MMOL/L (ref 22–29)
CREAT SERPL-MCNC: 0.82 MG/DL (ref 0.76–1.27)
EOSINOPHIL # BLD AUTO: 0.03 10*3/MM3 (ref 0–0.4)
EOSINOPHIL NFR BLD AUTO: 0.4 % (ref 0.3–6.2)
ERYTHROCYTE [DISTWIDTH] IN BLOOD BY AUTOMATED COUNT: 12.8 % (ref 12.3–15.4)
GLOBULIN SER CALC-MCNC: 1.7 GM/DL
GLUCOSE SERPL-MCNC: 195 MG/DL (ref 65–99)
HBA1C MFR BLD: 8.7 % (ref 4.8–5.6)
HCT VFR BLD AUTO: 44.5 % (ref 37.5–51)
HDLC SERPL-MCNC: 56 MG/DL (ref 40–60)
HGB BLD-MCNC: 14.9 G/DL (ref 13–17.7)
IMM GRANULOCYTES # BLD AUTO: 0.03 10*3/MM3 (ref 0–0.05)
IMM GRANULOCYTES NFR BLD AUTO: 0.4 % (ref 0–0.5)
LDLC SERPL CALC-MCNC: 72 MG/DL (ref 0–100)
LYMPHOCYTES # BLD AUTO: 2.15 10*3/MM3 (ref 0.7–3.1)
LYMPHOCYTES NFR BLD AUTO: 28 % (ref 19.6–45.3)
MCH RBC QN AUTO: 30.3 PG (ref 26.6–33)
MCHC RBC AUTO-ENTMCNC: 33.5 G/DL (ref 31.5–35.7)
MCV RBC AUTO: 90.6 FL (ref 79–97)
MONOCYTES # BLD AUTO: 0.51 10*3/MM3 (ref 0.1–0.9)
MONOCYTES NFR BLD AUTO: 6.6 % (ref 5–12)
NEUTROPHILS # BLD AUTO: 4.94 10*3/MM3 (ref 1.7–7)
NEUTROPHILS NFR BLD AUTO: 64.3 % (ref 42.7–76)
NRBC BLD AUTO-RTO: 0 /100 WBC (ref 0–0.2)
PLATELET # BLD AUTO: 244 10*3/MM3 (ref 140–450)
POTASSIUM SERPL-SCNC: 4.2 MMOL/L (ref 3.5–5.2)
PROT SERPL-MCNC: 6.4 G/DL (ref 6–8.5)
RBC # BLD AUTO: 4.91 10*6/MM3 (ref 4.14–5.8)
SODIUM SERPL-SCNC: 134 MMOL/L (ref 136–145)
TRIGL SERPL-MCNC: 65 MG/DL (ref 0–150)
TSH SERPL DL<=0.005 MIU/L-ACNC: 2.19 UIU/ML (ref 0.27–4.2)
VLDLC SERPL CALC-MCNC: 13 MG/DL
WBC # BLD AUTO: 7.68 10*3/MM3 (ref 3.4–10.8)

## 2020-01-31 ENCOUNTER — OFFICE VISIT (OUTPATIENT)
Dept: FAMILY MEDICINE CLINIC | Facility: CLINIC | Age: 75
End: 2020-01-31

## 2020-01-31 DIAGNOSIS — I10 ESSENTIAL HYPERTENSION: ICD-10-CM

## 2020-01-31 DIAGNOSIS — I25.10 CORONARY ARTERY CALCIFICATION SEEN ON CT SCAN: ICD-10-CM

## 2020-01-31 DIAGNOSIS — Z00.00 HEALTHCARE MAINTENANCE: ICD-10-CM

## 2020-01-31 DIAGNOSIS — F17.200 CURRENT SMOKER: ICD-10-CM

## 2020-01-31 DIAGNOSIS — E11.9 TYPE 2 DIABETES MELLITUS WITHOUT COMPLICATION, WITHOUT LONG-TERM CURRENT USE OF INSULIN (HCC): ICD-10-CM

## 2020-01-31 DIAGNOSIS — F41.8 ANXIETY ASSOCIATED WITH DEPRESSION: ICD-10-CM

## 2020-01-31 DIAGNOSIS — K21.9 GASTROESOPHAGEAL REFLUX DISEASE WITHOUT ESOPHAGITIS: ICD-10-CM

## 2020-01-31 DIAGNOSIS — M54.59 MECHANICAL LOW BACK PAIN: ICD-10-CM

## 2020-01-31 DIAGNOSIS — I70.0 ATHEROSCLEROSIS OF AORTA (HCC): ICD-10-CM

## 2020-01-31 DIAGNOSIS — Z87.891 PERSONAL HISTORY OF NICOTINE DEPENDENCE: ICD-10-CM

## 2020-01-31 DIAGNOSIS — J44.9 MIXED TYPE COPD (CHRONIC OBSTRUCTIVE PULMONARY DISEASE) (HCC): ICD-10-CM

## 2020-01-31 DIAGNOSIS — R10.13 DYSPEPSIA: ICD-10-CM

## 2020-01-31 DIAGNOSIS — L98.9 SKIN LESION: ICD-10-CM

## 2020-01-31 DIAGNOSIS — E78.2 MIXED HYPERLIPIDEMIA: Primary | ICD-10-CM

## 2020-01-31 PROCEDURE — 99214 OFFICE O/P EST MOD 30 MIN: CPT | Performed by: GENERAL PRACTICE

## 2020-01-31 RX ORDER — LANCETS 30 GAUGE
EACH MISCELLANEOUS
Qty: 90 EACH | Refills: 5 | Status: SHIPPED | OUTPATIENT
Start: 2020-01-31 | End: 2020-12-29

## 2020-01-31 RX ORDER — BLOOD-GLUCOSE METER
1 KIT MISCELLANEOUS ONCE
Qty: 1 EACH | Refills: 0 | Status: SHIPPED | OUTPATIENT
Start: 2020-01-31 | End: 2021-02-01

## 2020-01-31 RX ORDER — GLUCOSAMINE HCL/CHONDROITIN SU 500-400 MG
CAPSULE ORAL
Qty: 90 EACH | Refills: 5 | Status: SHIPPED | OUTPATIENT
Start: 2020-01-31 | End: 2020-08-24 | Stop reason: SDUPTHER

## 2020-02-01 VITALS
WEIGHT: 133 LBS | BODY MASS INDEX: 20.16 KG/M2 | HEART RATE: 98 BPM | DIASTOLIC BLOOD PRESSURE: 65 MMHG | OXYGEN SATURATION: 93 % | HEIGHT: 68 IN | TEMPERATURE: 98.9 F | RESPIRATION RATE: 14 BRPM | SYSTOLIC BLOOD PRESSURE: 122 MMHG

## 2020-02-01 PROBLEM — L98.9 SKIN LESION: Status: ACTIVE | Noted: 2020-02-01

## 2020-02-03 DIAGNOSIS — E11.9 TYPE 2 DIABETES MELLITUS WITHOUT COMPLICATION, WITHOUT LONG-TERM CURRENT USE OF INSULIN (HCC): ICD-10-CM

## 2020-02-03 RX ORDER — DULAGLUTIDE 1.5 MG/.5ML
INJECTION, SOLUTION SUBCUTANEOUS
Qty: 2 ML | Refills: 5 | Status: SHIPPED | OUTPATIENT
Start: 2020-02-03 | End: 2020-02-06

## 2020-02-05 ENCOUNTER — PRIOR AUTHORIZATION (OUTPATIENT)
Dept: FAMILY MEDICINE CLINIC | Facility: CLINIC | Age: 75
End: 2020-02-05

## 2020-02-10 DIAGNOSIS — E11.9 TYPE 2 DIABETES MELLITUS WITHOUT COMPLICATION, WITHOUT LONG-TERM CURRENT USE OF INSULIN (HCC): ICD-10-CM

## 2020-02-17 ENCOUNTER — PROCEDURE VISIT (OUTPATIENT)
Dept: FAMILY MEDICINE CLINIC | Facility: CLINIC | Age: 75
End: 2020-02-17

## 2020-02-17 VITALS
DIASTOLIC BLOOD PRESSURE: 80 MMHG | TEMPERATURE: 97.6 F | BODY MASS INDEX: 20.16 KG/M2 | HEIGHT: 68 IN | OXYGEN SATURATION: 94 % | WEIGHT: 133 LBS | HEART RATE: 84 BPM | SYSTOLIC BLOOD PRESSURE: 144 MMHG

## 2020-02-17 DIAGNOSIS — L98.9 SKIN LESION OF FACE: Primary | ICD-10-CM

## 2020-02-17 PROCEDURE — 11104 PUNCH BX SKIN SINGLE LESION: CPT | Performed by: PHYSICIAN ASSISTANT

## 2020-02-17 NOTE — PATIENT INSTRUCTIONS
Wound Care, Adult  Taking care of your wound properly can help to prevent pain, infection, and scarring. It can also help your wound to heal more quickly.  How to care for your wound  Wound care         · Follow instructions from your health care provider about how to take care of your wound. Make sure you:  ? Wash your hands with soap and water before you change the bandage (dressing). If soap and water are not available, use hand .  ? Change your dressing as told by your health care provider.  ? Leave stitches (sutures), skin glue, or adhesive strips in place. These skin closures may need to stay in place for 2 weeks or longer. If adhesive strip edges start to loosen and curl up, you may trim the loose edges. Do not remove adhesive strips completely unless your health care provider tells you to do that.  · Check your wound area every day for signs of infection. Check for:  ? Redness, swelling, or pain.  ? Fluid or blood.  ? Warmth.  ? Pus or a bad smell.  · Ask your health care provider if you should clean the wound with mild soap and water. Doing this may include:  ? Using a clean towel to pat the wound dry after cleaning it. Do not rub or scrub the wound.  ? Applying a cream or ointment. Do this only as told by your health care provider.  ? Covering the incision with a clean dressing.  · Ask your health care provider when you can leave the wound uncovered.  · Keep the dressing dry until your health care provider says it can be removed. Do not take baths, swim, use a hot tub, or do anything that would put the wound underwater until your health care provider approves. Ask your health care provider if you can take showers. You may only be allowed to take sponge baths.  Medicines    · If you were prescribed an antibiotic medicine, cream, or ointment, take or use the antibiotic as told by your health care provider. Do not stop taking or using the antibiotic even if your condition improves.  · Take  over-the-counter and prescription medicines only as told by your health care provider. If you were prescribed pain medicine, take it 30 or more minutes before you do any wound care or as told by your health care provider.  General instructions  · Return to your normal activities as told by your health care provider. Ask your health care provider what activities are safe.  · Do not scratch or pick at the wound.  · Do not use any products that contain nicotine or tobacco, such as cigarettes and e-cigarettes. These may delay wound healing. If you need help quitting, ask your health care provider.  · Keep all follow-up visits as told by your health care provider. This is important.  · Eat a diet that includes protein, vitamin A, vitamin C, and other nutrient-rich foods to help the wound heal.  ? Foods rich in protein include meat, dairy, beans, nuts, and other sources.  ? Foods rich in vitamin A include carrots and dark green, leafy vegetables.  ? Foods rich in vitamin C include citrus, tomatoes, and other fruits and vegetables.  ? Nutrient-rich foods have protein, carbohydrates, fat, vitamins, or minerals. Eat a variety of healthy foods including vegetables, fruits, and whole grains.  Contact a health care provider if:  · You received a tetanus shot and you have swelling, severe pain, redness, or bleeding at the injection site.  · Your pain is not controlled with medicine.  · You have redness, swelling, or pain around the wound.  · You have fluid or blood coming from the wound.  · Your wound feels warm to the touch.  · You have pus or a bad smell coming from the wound.  · You have a fever or chills.  · You are nauseous or you vomit.  · You are dizzy.  Get help right away if:  · You have a red streak going away from your wound.  · The edges of the wound open up and separate.  · Your wound is bleeding, and the bleeding does not stop with gentle pressure.  · You have a rash.  · You faint.  · You have trouble  breathing.  Summary  · Always wash your hands with soap and water before changing your bandage (dressing).  · To help with healing, eat foods that are rich in protein, vitamin A, vitamin C, and other nutrients.  · Check your wound every day for signs of infection. Contact your health care provider if you suspect that your wound is infected.  This information is not intended to replace advice given to you by your health care provider. Make sure you discuss any questions you have with your health care provider.  Document Released: 09/26/2009 Document Revised: 01/29/2019 Document Reviewed: 07/04/2017  [employer] Interactive Patient Education © 2019 [employer]

## 2020-02-25 DIAGNOSIS — C44.319 BASAL CELL CARCINOMA (BCC) OF SKIN OF OTHER PART OF FACE: Primary | ICD-10-CM

## 2020-05-29 ENCOUNTER — LAB (OUTPATIENT)
Dept: FAMILY MEDICINE CLINIC | Facility: CLINIC | Age: 75
End: 2020-05-29

## 2020-05-29 DIAGNOSIS — E11.9 TYPE 2 DIABETES MELLITUS WITHOUT COMPLICATION, WITHOUT LONG-TERM CURRENT USE OF INSULIN (HCC): ICD-10-CM

## 2020-05-30 LAB — HBA1C MFR BLD: 6.7 % (ref 4.8–5.6)

## 2020-06-05 ENCOUNTER — HOSPITAL ENCOUNTER (OUTPATIENT)
Dept: CT IMAGING | Facility: HOSPITAL | Age: 75
Discharge: HOME | End: 2020-06-05
Admitting: GENERAL PRACTICE

## 2020-06-05 DIAGNOSIS — E11.9 TYPE 2 DIABETES MELLITUS WITHOUT COMPLICATION, WITHOUT LONG-TERM CURRENT USE OF INSULIN (HCC): ICD-10-CM

## 2020-06-05 DIAGNOSIS — Z87.891 PERSONAL HISTORY OF NICOTINE DEPENDENCE: ICD-10-CM

## 2020-06-05 PROCEDURE — G0297 LDCT FOR LUNG CA SCREEN: HCPCS | Performed by: RADIOLOGY

## 2020-06-05 PROCEDURE — G0297 LDCT FOR LUNG CA SCREEN: HCPCS

## 2020-06-19 ENCOUNTER — OFFICE VISIT (OUTPATIENT)
Dept: FAMILY MEDICINE CLINIC | Facility: CLINIC | Age: 75
End: 2020-06-19

## 2020-06-19 DIAGNOSIS — Z86.010 HISTORY OF COLONIC POLYPS: ICD-10-CM

## 2020-06-19 DIAGNOSIS — E78.2 MIXED HYPERLIPIDEMIA: Primary | ICD-10-CM

## 2020-06-19 DIAGNOSIS — E11.9 TYPE 2 DIABETES MELLITUS WITHOUT COMPLICATION, WITHOUT LONG-TERM CURRENT USE OF INSULIN (HCC): ICD-10-CM

## 2020-06-19 DIAGNOSIS — I25.10 CORONARY ARTERY CALCIFICATION SEEN ON CT SCAN: ICD-10-CM

## 2020-06-19 DIAGNOSIS — R35.0 BENIGN PROSTATIC HYPERPLASIA WITH URINARY FREQUENCY: ICD-10-CM

## 2020-06-19 DIAGNOSIS — I10 ESSENTIAL HYPERTENSION: ICD-10-CM

## 2020-06-19 DIAGNOSIS — Z00.00 HEALTHCARE MAINTENANCE: ICD-10-CM

## 2020-06-19 DIAGNOSIS — I70.0 ATHEROSCLEROSIS OF AORTA (HCC): ICD-10-CM

## 2020-06-19 DIAGNOSIS — C44.319 BASAL CELL CARCINOMA (BCC) OF SKIN OF OTHER PART OF FACE: ICD-10-CM

## 2020-06-19 DIAGNOSIS — N40.1 BENIGN PROSTATIC HYPERPLASIA WITH URINARY FREQUENCY: ICD-10-CM

## 2020-06-19 DIAGNOSIS — M54.59 MECHANICAL LOW BACK PAIN: ICD-10-CM

## 2020-06-19 DIAGNOSIS — M85.89 OSTEOPENIA OF MULTIPLE SITES: ICD-10-CM

## 2020-06-19 DIAGNOSIS — J44.9 MIXED TYPE COPD (CHRONIC OBSTRUCTIVE PULMONARY DISEASE) (HCC): ICD-10-CM

## 2020-06-19 DIAGNOSIS — F41.8 ANXIETY ASSOCIATED WITH DEPRESSION: ICD-10-CM

## 2020-06-19 DIAGNOSIS — K21.9 GASTROESOPHAGEAL REFLUX DISEASE WITHOUT ESOPHAGITIS: ICD-10-CM

## 2020-06-19 PROBLEM — R10.13 DYSPEPSIA: Status: RESOLVED | Noted: 2019-02-22 | Resolved: 2020-06-19

## 2020-06-19 PROCEDURE — 99442 PR PHYS/QHP TELEPHONE EVALUATION 11-20 MIN: CPT | Performed by: GENERAL PRACTICE

## 2020-06-19 RX ORDER — TAMSULOSIN HYDROCHLORIDE 0.4 MG/1
1 CAPSULE ORAL NIGHTLY
Qty: 90 CAPSULE | Refills: 3 | Status: SHIPPED | OUTPATIENT
Start: 2020-06-19 | End: 2020-10-02 | Stop reason: SDUPTHER

## 2020-06-19 NOTE — PROGRESS NOTES
Subjective   Torin Granados is a 74 y.o. male.     History of Present Illness     This visit has been rescheduled as a phone visit to comply with patient safety concerns in accordance with CDC recommendations. Total time of discussion was 14 minutes.    You have chosen to receive care through a telephone visit. Do you consent to use a telephone visit for your medical care today? Yes    Diabetes  Current symptoms include numbness and tingling of the feet. Patient denies visual disturbances, polydipsia, polyuria, hypoglycemia or foot ulcerations. Evaluation to date has been: hemoglobin A1C. Home sugars: Have generally been in the high double digits to low 100s fasting. Current treatments: metformin and GLP agonist -liraglutide and basal insulin-insulin degludec (together as xultophy 10 units daily). He continues to work on his diet and exercise plan  Lab Results   Component Value Date    HGBA1C 6.70 (H) 05/29/2020     Dyslipidemia  Compliance with treatment has been good. He is currently being prescribed the following medication for his dyslipidemia - simvastatin and ezetimibe. Patient denies side effects associated with his medications.     Hypertension  Home blood pressure readings: not doing. Associated signs and symptoms: none. Patient denies: chest pain, palpitations, dyspnea, orthopnea, paroxysmal nocturnal dyspnea and peripheral edema. Current antihypertensive medications includes lisinopril. Medication compliance: taking as prescribed.     Abdominal Pain  He denies any recent abdominal pain and has been eating well with no difficulty swallowing, nausea, vomiting, change in his bowel habits, hematochezia or melena. There's no history of any fever, chills, or night sweats. Contrast CT of the abdomen and pelvis performed on 6/24/2019 was reported as showing moderate constipation, diverticulosis of the splenic and descending colon, focal segment of narrowing of the splenic flexure felt to be most likely due to  spasm or incomplete distention, cholelithiasis, a small fat-containing right inguinal hernia, prostatic enlargement with central calcifications, and moderate to severe atherosclerosis.  Colonoscopy performed on 3/29/2017 revealed sigmoid diverticulosis but was otherwise unremarkable.    Basal Cell Carcinoma  Pathology on punch biopsy over his right medial nasolabial crease was consistent with a nodular and micronodular BCC.  Referral was made to plastic surgery but apparently an appointment was not finalized due to the current COVID-19 pandemic.    Imaging  Low-dose CT of the chest performed on 6/5/2020 was reported as showing moderate COPD, calcified mediastinal lymph nodes consistent with previous granulomatous disease, mild coronary artery calcifications, and cholelithiasis.  He was advised a one-year follow-up    The following portions of the patient's history were reviewed and updated as appropriate: allergies, current medications, past medical history, past social history and problem list.    Review of Systems   Constitutional: Negative for appetite change, chills, fatigue, fever and unexpected weight change.   HENT: Positive for rhinorrhea and sneezing. Negative for congestion, ear pain, postnasal drip, sore throat and voice change.    Eyes: Negative for visual disturbance.   Respiratory: Negative for cough, shortness of breath and wheezing.    Cardiovascular: Negative for chest pain, palpitations and leg swelling.   Gastrointestinal: Negative for abdominal pain, blood in stool, constipation, diarrhea, nausea and vomiting.   Endocrine: Negative for polydipsia and polyuria.   Genitourinary: Negative for dysuria, hematuria and urgency.        Nocturia x4-5 with occasional hesitancy   Musculoskeletal: Positive for arthralgias and back pain. Negative for myalgias.   Skin: Negative for rash.        Skin lesion face   Neurological: Positive for numbness. Negative for tremors, weakness and headaches.    Psychiatric/Behavioral: Negative for dysphoric mood and sleep disturbance. The patient is not nervous/anxious.      Objective   Physical Exam   Constitutional:   Alert and oriented.  Bright and in good spirits.  No apparent distress or shortness of breath.     Assessment/Plan   Problems Addressed this Visit        Cardiovascular and Mediastinum    Atherosclerosis of aorta (CMS/[facility])  Reminded regarding the importance of risk factor modification.  Continue current medication    Coronary artery calcification seen on CT scan  As above.    Essential hypertension  Encouraged to continue to work on his diet and exercise plan.  Continue current medication    Relevant Orders    CBC & Differential    Comprehensive Metabolic Panel    Mixed hyperlipidemia   As above.   Continue current medication.    Relevant Orders    Lipid Panel       Respiratory    Mixed type COPD (chronic obstructive pulmonary disease) (CMS/[facility])       Digestive    Gastroesophageal reflux disease without esophagitis       Endocrine    Type 2 diabetes mellitus without complication, without long-term current use of insulin (CMS/[facility])  Diabetes mellitus Type II, under excellent control.   Encouraged to continue to pursue ADA diet  Encouraged aerobic exercise.  Continue current medication  Scheduled for updated labs just prior to his return    Relevant Orders    Hemoglobin A1c    MicroAlbumin, Urine, Random - Urine, Clean Catch       Nervous and Auditory    Mechanical low back pain       Musculoskeletal and Integument    Basal cell carcinoma (BCC) of skin of face  Will finalize an appointment with plastic surgery  Patient will report if he does not hear something within a week or if he should have any trouble following up with this    Osteopenia of multiple sites       Genitourinary    Benign prostatic hyperplasia with urinary frequency  Reviewed options and agreed on a trial of tamsulosin  Encouraged to report if any worse, any new symptoms, or if no better  over the next several weeks    Relevant Medications    tamsulosin (FLOMAX) 0.4 MG capsule 24 hr capsule       Other    Anxiety associated with depression  Stable.  Supportive therapy.   Continue current medication.

## 2020-06-20 PROBLEM — C44.310 BASAL CELL CARCINOMA (BCC) OF SKIN OF FACE: Status: ACTIVE | Noted: 2020-02-01

## 2020-06-22 DIAGNOSIS — C44.319 BASAL CELL CARCINOMA (BCC) OF SKIN OF OTHER PART OF FACE: Primary | ICD-10-CM

## 2020-06-22 DIAGNOSIS — C44.90 NON-MELANOMA SKIN CANCER: ICD-10-CM

## 2020-07-08 DIAGNOSIS — E11.9 TYPE 2 DIABETES MELLITUS WITHOUT COMPLICATION, WITHOUT LONG-TERM CURRENT USE OF INSULIN (HCC): ICD-10-CM

## 2020-07-08 DIAGNOSIS — E78.49 OTHER HYPERLIPIDEMIA: ICD-10-CM

## 2020-07-08 RX ORDER — SIMVASTATIN 20 MG
20 TABLET ORAL NIGHTLY
Qty: 90 TABLET | Refills: 3 | Status: SHIPPED | OUTPATIENT
Start: 2020-07-08 | End: 2021-03-22

## 2020-08-24 DIAGNOSIS — E11.9 TYPE 2 DIABETES MELLITUS WITHOUT COMPLICATION, WITHOUT LONG-TERM CURRENT USE OF INSULIN (HCC): ICD-10-CM

## 2020-08-24 RX ORDER — GLUCOSAMINE HCL/CHONDROITIN SU 500-400 MG
CAPSULE ORAL
Qty: 90 EACH | Refills: 5 | Status: SHIPPED | OUTPATIENT
Start: 2020-08-24 | End: 2020-12-29

## 2020-09-14 ENCOUNTER — LAB (OUTPATIENT)
Dept: FAMILY MEDICINE CLINIC | Facility: CLINIC | Age: 75
End: 2020-09-14

## 2020-09-14 DIAGNOSIS — E11.9 TYPE 2 DIABETES MELLITUS WITHOUT COMPLICATION, WITHOUT LONG-TERM CURRENT USE OF INSULIN (HCC): ICD-10-CM

## 2020-09-14 DIAGNOSIS — I10 ESSENTIAL HYPERTENSION: ICD-10-CM

## 2020-09-14 DIAGNOSIS — E78.2 MIXED HYPERLIPIDEMIA: ICD-10-CM

## 2020-09-14 LAB
ALBUMIN SERPL-MCNC: 4.3 G/DL (ref 3.5–5.2)
ALBUMIN/GLOB SERPL: 2.7 G/DL
ALP SERPL-CCNC: 54 U/L (ref 39–117)
ALT SERPL-CCNC: 23 U/L (ref 1–41)
AST SERPL-CCNC: 20 U/L (ref 1–40)
BASOPHILS # BLD AUTO: 0.02 10*3/MM3 (ref 0–0.2)
BASOPHILS NFR BLD AUTO: 0.3 % (ref 0–1.5)
BILIRUB SERPL-MCNC: 0.3 MG/DL (ref 0–1.2)
BUN SERPL-MCNC: 12 MG/DL (ref 8–23)
BUN/CREAT SERPL: 14.5 (ref 7–25)
CALCIUM SERPL-MCNC: 8.6 MG/DL (ref 8.6–10.5)
CHLORIDE SERPL-SCNC: 98 MMOL/L (ref 98–107)
CHOLEST SERPL-MCNC: 128 MG/DL (ref 0–200)
CO2 SERPL-SCNC: 28.1 MMOL/L (ref 22–29)
CREAT SERPL-MCNC: 0.83 MG/DL (ref 0.76–1.27)
EOSINOPHIL # BLD AUTO: 0.06 10*3/MM3 (ref 0–0.4)
EOSINOPHIL NFR BLD AUTO: 0.8 % (ref 0.3–6.2)
ERYTHROCYTE [DISTWIDTH] IN BLOOD BY AUTOMATED COUNT: 12.8 % (ref 12.3–15.4)
GLOBULIN SER CALC-MCNC: 1.6 GM/DL
GLUCOSE SERPL-MCNC: 111 MG/DL (ref 65–99)
HBA1C MFR BLD: 6.8 % (ref 4.8–5.6)
HCT VFR BLD AUTO: 42.5 % (ref 37.5–51)
HDLC SERPL-MCNC: 53 MG/DL (ref 40–60)
HGB BLD-MCNC: 13.9 G/DL (ref 13–17.7)
IMM GRANULOCYTES # BLD AUTO: 0.02 10*3/MM3 (ref 0–0.05)
IMM GRANULOCYTES NFR BLD AUTO: 0.3 % (ref 0–0.5)
LDLC SERPL CALC-MCNC: 64 MG/DL (ref 0–100)
LYMPHOCYTES # BLD AUTO: 2.48 10*3/MM3 (ref 0.7–3.1)
LYMPHOCYTES NFR BLD AUTO: 34.6 % (ref 19.6–45.3)
MCH RBC QN AUTO: 30.8 PG (ref 26.6–33)
MCHC RBC AUTO-ENTMCNC: 32.7 G/DL (ref 31.5–35.7)
MCV RBC AUTO: 94.2 FL (ref 79–97)
MONOCYTES # BLD AUTO: 0.54 10*3/MM3 (ref 0.1–0.9)
MONOCYTES NFR BLD AUTO: 7.5 % (ref 5–12)
NEUTROPHILS # BLD AUTO: 4.04 10*3/MM3 (ref 1.7–7)
NEUTROPHILS NFR BLD AUTO: 56.5 % (ref 42.7–76)
NRBC BLD AUTO-RTO: 0 /100 WBC (ref 0–0.2)
PLATELET # BLD AUTO: 196 10*3/MM3 (ref 140–450)
POTASSIUM SERPL-SCNC: 4.5 MMOL/L (ref 3.5–5.2)
PROT SERPL-MCNC: 5.9 G/DL (ref 6–8.5)
RBC # BLD AUTO: 4.51 10*6/MM3 (ref 4.14–5.8)
SODIUM SERPL-SCNC: 134 MMOL/L (ref 136–145)
TRIGL SERPL-MCNC: 54 MG/DL (ref 0–150)
VLDLC SERPL CALC-MCNC: 10.8 MG/DL
WBC # BLD AUTO: 7.16 10*3/MM3 (ref 3.4–10.8)

## 2020-09-15 LAB — MICROALBUMIN UR-MCNC: <3 UG/ML

## 2020-10-02 ENCOUNTER — OFFICE VISIT (OUTPATIENT)
Dept: FAMILY MEDICINE CLINIC | Facility: CLINIC | Age: 75
End: 2020-10-02

## 2020-10-02 DIAGNOSIS — M85.89 OSTEOPENIA OF MULTIPLE SITES: ICD-10-CM

## 2020-10-02 DIAGNOSIS — R35.0 BENIGN PROSTATIC HYPERPLASIA WITH URINARY FREQUENCY: ICD-10-CM

## 2020-10-02 DIAGNOSIS — Z86.010 HISTORY OF COLONIC POLYPS: ICD-10-CM

## 2020-10-02 DIAGNOSIS — N40.1 BENIGN PROSTATIC HYPERPLASIA WITH URINARY FREQUENCY: ICD-10-CM

## 2020-10-02 DIAGNOSIS — E78.2 MIXED HYPERLIPIDEMIA: ICD-10-CM

## 2020-10-02 DIAGNOSIS — E11.9 TYPE 2 DIABETES MELLITUS WITHOUT COMPLICATION, WITHOUT LONG-TERM CURRENT USE OF INSULIN (HCC): ICD-10-CM

## 2020-10-02 DIAGNOSIS — J44.9 MIXED TYPE COPD (CHRONIC OBSTRUCTIVE PULMONARY DISEASE) (HCC): ICD-10-CM

## 2020-10-02 DIAGNOSIS — I25.10 CORONARY ARTERY CALCIFICATION SEEN ON CT SCAN: Primary | ICD-10-CM

## 2020-10-02 DIAGNOSIS — M54.59 MECHANICAL LOW BACK PAIN: ICD-10-CM

## 2020-10-02 DIAGNOSIS — F41.8 ANXIETY ASSOCIATED WITH DEPRESSION: ICD-10-CM

## 2020-10-02 DIAGNOSIS — I10 ESSENTIAL HYPERTENSION: ICD-10-CM

## 2020-10-02 DIAGNOSIS — K21.9 GASTROESOPHAGEAL REFLUX DISEASE WITHOUT ESOPHAGITIS: ICD-10-CM

## 2020-10-02 DIAGNOSIS — C44.319 BASAL CELL CARCINOMA (BCC) OF SKIN OF OTHER PART OF FACE: ICD-10-CM

## 2020-10-02 DIAGNOSIS — E29.1 ANDROGEN DEFICIENCY: ICD-10-CM

## 2020-10-02 DIAGNOSIS — Z00.00 HEALTHCARE MAINTENANCE: ICD-10-CM

## 2020-10-02 DIAGNOSIS — I70.0 ATHEROSCLEROSIS OF AORTA (HCC): ICD-10-CM

## 2020-10-02 PROCEDURE — G0439 PPPS, SUBSEQ VISIT: HCPCS | Performed by: GENERAL PRACTICE

## 2020-10-02 PROCEDURE — 96160 PT-FOCUSED HLTH RISK ASSMT: CPT | Performed by: GENERAL PRACTICE

## 2020-10-02 RX ORDER — TAMSULOSIN HYDROCHLORIDE 0.4 MG/1
1 CAPSULE ORAL NIGHTLY
Qty: 90 CAPSULE | Refills: 3 | Status: SHIPPED | OUTPATIENT
Start: 2020-10-02 | End: 2021-09-09 | Stop reason: SDUPTHER

## 2020-10-02 NOTE — PROGRESS NOTES
The ABCs of the Annual Wellness Visit  Subsequent Medicare Wellness Visit    Subjective   History of Present Illness:  Torin Granados is a 75 y.o. male who presents for a Subsequent Medicare Wellness Visit.    Diabetes  Current symptoms include numbness and tingling of the feet. Patient denies visual disturbances, polydipsia, polyuria, hypoglycemia or foot ulcerations. Evaluation to date has been: hemoglobin A1C. Home sugars: have remained at goal. Current treatments: metformin and GLP agonist -liraglutide and basal insulin-insulin degludec (together as xultophy 10 units daily). He continues to work on his diet and exercise plan  Lab Results   Component Value Date    HGBA1C 6.80 (H) 09/14/2020     Lab Results   Component Value Date    MICROALBUR <3.0 09/14/2020     Dyslipidemia  Compliance with treatment has been good. He is currently being prescribed the following medication for his dyslipidemia - simvastatin and ezetimibe. Patient denies side effects associated with his medications.   Lab Results   Component Value Date    CHOL 146 02/09/2017    CHLPL 128 09/14/2020    TRIG 54 09/14/2020    HDL 53 09/14/2020    LDL 64 09/14/2020     Hypertension  Home blood pressure readings: not doing. Associated signs and symptoms: none. Patient denies: chest pain, palpitations, dyspnea, orthopnea, paroxysmal nocturnal dyspnea and peripheral edema. Current antihypertensive medications includes lisinopril. Medication compliance: taking as prescribed.   Lab Results   Component Value Date    GLUCOSE 180 (H) 07/03/2019    BUN 12 09/14/2020    CREATININE 0.83 09/14/2020    EGFRIFNONA 91 09/14/2020    EGFRIFAFRI 110 09/14/2020    BCR 14.5 09/14/2020    K 4.5 09/14/2020    CO2 28.1 09/14/2020    CALCIUM 8.6 09/14/2020    PROTENTOTREF 5.9 (L) 09/14/2020    ALBUMIN 4.30 09/14/2020    LABIL2 2.7 09/14/2020    AST 20 09/14/2020    ALT 23 09/14/2020     Abdominal Pain  He denies any abdominal pain since last here and has been eating well  with no difficulty swallowing, nausea, vomiting, change in his bowel habits, hematochezia or melena. There's no history of any fever, chills, or night sweats. Contrast CT of the abdomen and pelvis performed on 6/24/2019 was reported as showing moderate constipation, diverticulosis of the splenic and descending colon, focal segment of narrowing of the splenic flexure felt to be most likely due to spasm or incomplete distention, cholelithiasis, a small fat-containing right inguinal hernia, prostatic enlargement with central calcifications, and moderate to severe atherosclerosis.  Colonoscopy performed on 3/29/2017 revealed sigmoid diverticulosis but was otherwise unremarkable.  Lab Results   Component Value Date    WBC 7.16 09/14/2020    HGB 13.9 09/14/2020    HCT 42.5 09/14/2020    MCV 94.2 09/14/2020     09/14/2020     BPH  He has noted some improvement in his nocturia, hesitancy, and stream since starting on tamsulosin.  He continues to deny any daytime frequency, sense of incomplete voiding, dysuria, or hematuria.    Basal Cell Carcinoma  Pathology on punch biopsy over his right medial nasolabial crease was consistent with a nodular and micronodular BCC.  Referral was made to plastic surgery but apparently an appointment was not finalized due to the current COVID-19 pandemic.    Imaging  Low-dose CT of the chest performed on 6/5/2020 was reported as showing moderate COPD, calcified mediastinal lymph nodes consistent with previous granulomatous disease, mild coronary artery calcifications, and cholelithiasis.  He was advised a one-year follow-up    HEALTH RISK ASSESSMENT    Recent Hospitalizations:  No hospitalization(s) within the last year.    Current Medical Providers:  Patient Care Team:  Jan Dudley MD as PCP - General (Family Medicine)    Smoking Status:  Social History     Tobacco Use   Smoking Status Former Smoker   • Years: 40.00   • Types: Cigarettes   • Quit date: 7/1/2016   • Years  since quittin.2   Smokeless Tobacco Never Used   Tobacco Comment    estimated quit date       Alcohol Consumption:  Social History     Substance and Sexual Activity   Alcohol Use No       Depression Screen:   PHQ-2/PHQ-9 Depression Screening 10/2/2020   Little interest or pleasure in doing things 0   Feeling down, depressed, or hopeless 1   Trouble falling or staying asleep, or sleeping too much 1   Feeling tired or having little energy 1   Poor appetite or overeating 0   Feeling bad about yourself - or that you are a failure or have let yourself or your family down 0   Trouble concentrating on things, such as reading the newspaper or watching television 1   Moving or speaking so slowly that other people could have noticed. Or the opposite - being so fidgety or restless that you have been moving around a lot more than usual 0   Thoughts that you would be better off dead, or of hurting yourself in some way 0   Total Score 4   If you checked off any problems, how difficult have these problems made it for you to do your work, take care of things at home, or get along with other people? Not difficult at all       Fall Risk Screen:  STEADI Fall Risk Assessment was completed, and patient is at LOW risk for falls.Assessment completed on:10/2/2020    Health Habits and Functional and Cognitive Screening:  Functional & Cognitive Status 10/2/2020   Do you have difficulty preparing food and eating? No   Do you have difficulty bathing yourself, getting dressed or grooming yourself? No   Do you have difficulty using the toilet? No   Do you have difficulty moving around from place to place? No   Do you have trouble with steps or getting out of a bed or a chair? No   Current Diet Well Balanced Diet   Dental Exam Not up to date   Eye Exam Not up to date   Exercise (times per week) 5 times per week   Current Exercises Include Walking   Current Exercise Activities Include -   Do you need help using the phone?  No   Are you deaf or  do you have serious difficulty hearing?  Yes   Do you need help with transportation? No   Do you need help shopping? No   Do you need help preparing meals?  No   Do you need help with housework?  No   Do you need help with laundry? No   Do you need help taking your medications? No   Do you need help managing money? No   Do you ever drive or ride in a car without wearing a seat belt? No   Have you felt unusual stress, anger or loneliness in the last month? No   Who do you live with? Spouse   If you need help, do you have trouble finding someone available to you? No   Have you been bothered in the last four weeks by sexual problems? No   Do you have difficulty concentrating, remembering or making decisions? No     Does the patient have evidence of cognitive impairment? No    Asprin use counseling:Start ASA 81 mg daily     Age-appropriate Screening Schedule:  Refer to the list below for future screening recommendations based on patient's age, sex and/or medical conditions. Orders for these recommended tests are listed in the plan section. The patient has been provided with a written plan.    Health Maintenance   Topic Date Due   • DIABETIC FOOT EXAM  09/01/2019   • DIABETIC EYE EXAM  03/28/2020   • DXA SCAN  03/02/2021   • HEMOGLOBIN A1C  03/14/2021   • LIPID PANEL  09/14/2021   • URINE MICROALBUMIN  09/14/2021   • COLONOSCOPY  03/29/2022   • TDAP/TD VACCINES (2 - Td) 01/01/2024   • INFLUENZA VACCINE  Completed   • ZOSTER VACCINE  Completed          The following portions of the patient's history were reviewed and updated as appropriate: allergies, current medications, past family history, past medical history, past social history, past surgical history and problem list.    Outpatient Medications Prior to Visit   Medication Sig Dispense Refill   • DULoxetine (CYMBALTA) 60 MG capsule TAKE 2 CAPSULES BY MOUTH ONCE DAILY 180 capsule 3   • ezetimibe (ZETIA) 10 MG tablet TAKE ONE TABLET BY MOUTH EVERY DAY 90 tablet 3   •  Glucose Blood (BLOOD GLUCOSE TEST) strip 1 three times daily 90 each 5   • Insulin Degludec-Liraglutide (XULTOPHY) 100-3.6 UNIT-MG/ML solution pen-injector Inject 10 Units under the skin into the appropriate area as directed Daily. 3 pen 3   • Insulin Pen Needle (INSUPEN PEN NEEDLES) 32G X 4 MM misc 1 each Daily. 50 each 5   • Lancets misc 1 three times daily 90 each 5   • lansoprazole (PREVACID) 30 MG capsule TAKE 1 CAPSULE BY MOUTH DAILY. 90 capsule 3   • lisinopril (PRINIVIL,ZESTRIL) 10 MG tablet TAKE ONE TABLET BY MOUTH EVERY DAY 90 tablet 3   • metFORMIN (GLUCOPHAGE) 1000 MG tablet TAKE ONE TABLET BY MOUTH TWO TIMES A DAY WITH A MEAL 180 tablet 3   • montelukast (SINGULAIR) 10 MG tablet Take 1 tablet by mouth Daily. 90 tablet 3   • simvastatin (ZOCOR) 20 MG tablet TAKE 1 TABLET BY MOUTH EVERY NIGHT. 90 tablet 3   • Testosterone 20.25 MG/ACT (1.62%) gel APPLY 2 PUMPS ON THE SKIN ONCE DAILY 225 g 5   • Zoster Vac Recomb Adjuvanted (SHINGRIX) 50 MCG reconstituted suspension Inject 50 mcg into the shoulder, thigh, or buttocks See Admin Instructions. Repeat in 2-6 months 1 each 1   • tamsulosin (FLOMAX) 0.4 MG capsule 24 hr capsule Take 1 capsule by mouth Every Night. 90 capsule 3     No facility-administered medications prior to visit.        Patient Active Problem List   Diagnosis   • Gastroesophageal reflux disease without esophagitis   • Anxiety associated with depression   • Mechanical low back pain   • Atherosclerosis of aorta (CMS/HCC)   • Essential hypertension   • Mixed hyperlipidemia   • Type 2 diabetes mellitus without complication, without long-term current use of insulin (CMS/HCC)   • Androgen deficiency   • Healthcare maintenance   • History of colonic polyps   • Coronary artery calcification seen on CT scan   • Osteopenia of multiple sites   • Lumbosacral radiculopathy at S1   • Mixed type COPD (chronic obstructive pulmonary disease) (CMS/HCC)   • Basal cell carcinoma (BCC) of skin of face   • Benign  "prostatic hyperplasia with urinary frequency       Advanced Care Planning:  ACP discussion was held with the patient during this visit. Patient does not have an advance directive, information provided.    Review of Systems   Constitutional: Negative for appetite change, chills, fatigue, fever and unexpected weight change.   HENT: Positive for rhinorrhea and sneezing. Negative for congestion, ear pain, postnasal drip, sore throat and voice change.    Eyes: Negative for visual disturbance.   Respiratory: Negative for cough, shortness of breath and wheezing.    Cardiovascular: Negative for chest pain, palpitations and leg swelling.   Gastrointestinal: Negative for abdominal pain, blood in stool, constipation, diarrhea, nausea and vomiting.   Endocrine: Negative for polydipsia and polyuria.   Genitourinary: Negative for dysuria, hematuria and urgency.        Nocturia x4-5 with occasional hesitancy   Musculoskeletal: Positive for arthralgias and back pain. Negative for myalgias.   Skin: Negative for rash.        Skin lesion face   Neurological: Positive for numbness. Negative for tremors, weakness and headaches.   Psychiatric/Behavioral: Negative for dysphoric mood and sleep disturbance. The patient is not nervous/anxious.      Compared to one year ago, the patient feels his physical health is the same.  Compared to one year ago, the patient feels his mental health is the same.    Reviewed chart for potential of high risk medication in the elderly: yes  Reviewed chart for potential of harmful drug interactions in the elderly:yes    Objective      Vitals:    10/02/20 1049   BP: 124/64   Pulse: 79   Resp: 14   Temp: 96.9 °F (36.1 °C)   TempSrc: Tympanic   SpO2: 93%   Weight: 59.9 kg (132 lb)   Height: 172.7 cm (67.99\")       Body mass index is 20.08 kg/m².  Discussed the patient's BMI with him. The BMI is in the acceptable range.    Physical Exam  Constitutional:       General: He is not in acute distress.     Appearance: " Normal appearance. He is well-developed. He is not ill-appearing or diaphoretic.   HENT:      Head: Atraumatic.      Right Ear: Tympanic membrane, ear canal and external ear normal.      Left Ear: Tympanic membrane, ear canal and external ear normal.   Eyes:      Conjunctiva/sclera: Conjunctivae normal.   Neck:      Thyroid: No thyroid mass or thyromegaly.      Vascular: No carotid bruit or JVD.      Trachea: Trachea normal. No tracheal deviation.   Cardiovascular:      Rate and Rhythm: Normal rate and regular rhythm.      Heart sounds: Normal heart sounds, S1 normal and S2 normal. No murmur. No gallop. No S3 or S4 sounds.    Pulmonary:      Effort: Pulmonary effort is normal.      Breath sounds: Wheezing (diffuse - mild) present.      Comments: Pulmonary hyperinflation  Abdominal:      General: Bowel sounds are normal. There is no distension.      Palpations: Abdomen is soft. There is no hepatomegaly, splenomegaly or mass.      Tenderness: There is no abdominal tenderness.      Hernia: No hernia is present.   Musculoskeletal:      Right lower leg: No edema.      Left lower leg: No edema.   Lymphadenopathy:      Head:      Right side of head: No submental, submandibular, tonsillar, preauricular, posterior auricular or occipital adenopathy.      Left side of head: No submental, submandibular, tonsillar, preauricular, posterior auricular or occipital adenopathy.      Cervical: No cervical adenopathy.      Upper Body:      Right upper body: No supraclavicular adenopathy.      Left upper body: No supraclavicular adenopathy.   Skin:     General: Skin is warm and dry.      Coloration: Skin is not cyanotic, jaundiced or pale.      Findings: No rash.      Nails: There is no clubbing.     Neurological:      Mental Status: He is alert and oriented to person, place, and time.      Cranial Nerves: No cranial nerve deficit.      Sensory: Sensory deficit (decreased vibration sense toes of both feet) present.      Motor: No  tremor.      Coordination: Coordination normal.      Gait: Gait normal.   Psychiatric:         Attention and Perception: Attention normal.         Mood and Affect: Mood normal.         Speech: Speech normal.         Behavior: Behavior normal.       Assessment/Plan   Medicare Risks and Personalized Health Plan  CMS Preventative Services Quick Reference  Advance Directive Discussion  Cardiovascular risk  Immunizations Discussed/Encouraged (specific immunizations; Influenza )  Lung Cancer Risk  Osteoprorosis Risk  Tobacco Use/Dependance (use dotphrase .tobaccocessation for documentation)    The above risks/problems have been discussed with the patient.  Pertinent information has been shared with the patient in the After Visit Summary.  Follow up plans and orders are seen below in the Assessment/Plan Section.    Diagnoses and all orders for this visit:    1. Coronary artery calcification seen on CT scan   Reminded regarding the importance of risk factor modification.  Instructed to start on ASA 81 daily  -     aspirin 81 MG EC tablet; Take 1 tablet by mouth Daily.  Dispense: 90 tablet; Refill: 3    2. Atherosclerosis of aorta (CMS/[facility])  As above.  -     aspirin 81 MG EC tablet; Take 1 tablet by mouth Daily.  Dispense: 90 tablet; Refill: 3    3. Type 2 diabetes mellitus without complication, without long-term current use of insulin (CMS/[facility])  Diabetes mellitus Type II, under excellent control.   Encouraged to continue to pursue ADA diet  Encouraged aerobic exercise.  Continue remaining medication  -     aspirin 81 MG EC tablet; Take 1 tablet by mouth Daily.  Dispense: 90 tablet; Refill: 3    4. Mixed hyperlipidemia  As above.   Continue current medication.    5. Essential hypertension  As above.   Continue current medication.    6. Mixed type COPD (chronic obstructive pulmonary disease) (CMS/[facility])   COPD is stable.  Reminded of the importance of smoking cessation  Encouraged to remain as active as symptoms allow for    7.  Gastroesophageal reflux disease without esophagitis    8. Mechanical low back pain    9. Androgen deficiency    10. Osteopenia of multiple sites    11. Benign prostatic hyperplasia with urinary frequency  Continue current medication  -     tamsulosin (FLOMAX) 0.4 MG capsule 24 hr capsule; Take 1 capsule by mouth Every Night.  Dispense: 90 capsule; Refill: 3    12. Anxiety associated with depression    13. History of colonic polyps  Will be due for an updated colonoscopy in early 2022    14. Basal cell carcinoma (BCC) of skin of other part of face  Will try to finalize an appointment for excisional biopsy    15. Healthcare maintenance  Patient has already received a flu shot fall.    Follow Up:  Return in about 4 months (around 1/28/2021).     An After Visit Summary and PPPS were given to the patient.

## 2020-10-02 NOTE — PATIENT INSTRUCTIONS
Advance Directive    Advance directives are legal documents that let you make choices ahead of time about your health care and medical treatment in case you become unable to communicate for yourself. Advance directives are a way for you to communicate your wishes to family, friends, and health care providers. This can help convey your decisions about end-of-life care if you become unable to communicate.  Discussing and writing advance directives should happen over time rather than all at once. Advance directives can be changed depending on your situation and what you want, even after you have signed the advance directives.  If you do not have an advance directive, some states assign family decision makers to act on your behalf based on how closely you are related to them. Each state has its own laws regarding advance directives. You may want to check with your health care provider, , or state representative about the laws in your state. There are different types of advance directives, such as:  · Medical power of .  · Living will.  · Do not resuscitate (DNR) or do not attempt resuscitation (DNAR) order.  Health care proxy and medical power of   A health care proxy, also called a health care agent, is a person who is appointed to make medical decisions for you in cases in which you are unable to make the decisions yourself. Generally, people choose someone they know well and trust to represent their preferences. Make sure to ask this person for an agreement to act as your proxy. A proxy may have to exercise judgment in the event of a medical decision for which your wishes are not known.  A medical power of  is a legal document that names your health care proxy. Depending on the laws in your state, after the document is written, it may also need to be:  · Signed.  · Notarized.  · Dated.  · Copied.  · Witnessed.  · Incorporated into your medical record.  You may also want to appoint  someone to manage your financial affairs in a situation in which you are unable to do so. This is called a durable power of  for finances. It is a separate legal document from the durable power of  for health care. You may choose the same person or someone different from your health care proxy to act as your agent in financial matters.  If you do not appoint a proxy, or if there is a concern that the proxy is not acting in your best interests, a court-appointed guardian may be designated to act on your behalf.  Living will  A living will is a set of instructions documenting your wishes about medical care when you cannot express them yourself. Health care providers should keep a copy of your living will in your medical record. You may want to give a copy to family members or friends. To alert caregivers in case of an emergency, you can place a card in your wallet to let them know that you have a living will and where they can find it. A living will is used if you become:  · Terminally ill.  · Incapacitated.  · Unable to communicate or make decisions.  Items to consider in your living will include:  · The use or non-use of life-sustaining equipment, such as dialysis machines and breathing machines (ventilators).  · A DNR or DNAR order, which is the instruction not to use cardiopulmonary resuscitation (CPR) if breathing or heartbeat stops.  · The use or non-use of tube feeding.  · Withholding of food and fluids.  · Comfort (palliative) care when the goal becomes comfort rather than a cure.  · Organ and tissue donation.  A living will does not give instructions for distributing your money and property if you should pass away. It is recommended that you seek the advice of a  when writing a will. Decisions about taxes, beneficiaries, and asset distribution will be legally binding. This process can relieve your family and friends of any concerns surrounding disputes or questions that may come up about  the distribution of your assets.  DNR or DNAR  A DNR or DNAR order is a request not to have CPR in the event that your heart stops beating or you stop breathing. If a DNR or DNAR order has not been made and shared, a health care provider will try to help any patient whose heart has stopped or who has stopped breathing. If you plan to have surgery, talk with your health care provider about how your DNR or DNAR order will be followed if problems occur.  Summary  · Advance directives are the legal documents that allow you to make choices ahead of time about your health care and medical treatment in case you become unable to communicate for yourself.  · The process of discussing and writing advance directives should happen over time. You can change the advance directives, even after you have signed them.  · Advance directives include DNR or DNAR orders, living ruiz, and designating an agent as your medical power of .  This information is not intended to replace advice given to you by your health care provider. Make sure you discuss any questions you have with your health care provider.  Document Released: 03/26/2009 Document Revised: 01/22/2020 Document Reviewed: 11/06/2017  [employer] Patient Education © 2020 [employer]      Heart Disease Prevention  Heart disease is the leading cause of death in the world. Coronary artery disease is the most common cause of heart disease. This condition results when cholesterol and other substances (plaque) build up inside the walls of the blood vessels that supply your heart muscle (arteries). This buildup in arteries is called atherosclerosis. You can take actions to lower your risk of heart disease.  How can heart disease affect me?  Heart disease can cause many unpleasant symptoms and complications, such as:  · Chest pain (angina).  · Reduced or blocked blood flow to your heart. This can cause:  ? Irregular heartbeats (arrhythmias).  ? Heart attack.  ? Heart  failure.  What can increase my risk?  The following factors may make you more likely to develop this condition:  · High blood pressure (hypertension).  · High cholesterol.  · Smoking.  · A diet high in saturated fats or trans fats.  · Lack of physical activity.  · Obesity.  · Drinking too much alcohol.  · Diabetes.  · Having a family history of heart disease.  What actions can I take to prevent heart disease?  Nutrition    · Eat a heart-healthy eating plan as told by your health care provider. Examples include the DASH (Dietary Approaches to Stop Hypertension) eating plan or the Mediterranean diet.  · Generally, it is recommended that you:  ? Eat less salt (sodium). Ask your health care provider how much sodium is safe for you. Most people should have less than 2,300 mg each day.  ? Limit unhealthy fats, such as saturated and trans fats, in your diet. You can do this by eating low-fat dairy products, eating less red meat, and avoiding processed foods.  ? Eat healthy fats (omega-3 fatty acids). These are found in fish, such as mackerel or salmon.  ? Eat more fruits and vegetables. You should try to fill one-half of your plate with fruits and vegetables at each meal.  ? Eat more whole grains.  ? Avoid foods and drinks that have added sugars.  Lifestyle    · Get regular exercise. This is one of the most important things you can do for your health. Generally, it is recommended that you:  ? Exercise for at least 30 minutes on most days of the week (150 minutes each week). The exercise should increase your heart rate and make you sweat (aerobic exercise).  ? Add strength exercises on at least 2 days each week.  · Do not use any products that contain nicotine or tobacco, such as cigarettes and e-cigarettes. These can damage your heart and blood vessels. If you need help quitting, ask your health care provider.  Alcohol use  · Do not drink alcohol if:  ? Your health care provider tells you not to drink.  ? You are pregnant,  may be pregnant, or are planning to become pregnant.  · If you drink alcohol, limit how much you have:  ? 0-1 drink a day for women.  ? 0-2 drinks a day for men.  · Be aware of how much alcohol is in your drink. In the [place] one drink equals one typical bottle of beer (12 oz), one-half glass of wine (5 oz), or one shot of hard liquor (1½ oz).  Medicines  · Take over-the-counter and prescription medicines only as told by your health care provider.  · Ask your health care provider whether you should take an aspirin every day. Taking aspirin may help reduce your risk of heart disease and stroke.  · Depending on your risk factors, your health care provider may prescribe medicines to lower your risk of heart disease or to control related conditions. You may take medicine to:  ? Lower cholesterol.  ? Control blood pressure.  ? Control diabetes.  General information  · Keep your blood pressure under control, as recommended by your health care provider. For most healthy people, the upper number of your blood pressure (systolic) should be no higher than 120, and the lower number (diastolic) no higher than 80. Treatment may be needed if your blood pressure is higher than 130/80.  · Have your blood pressure checked at least every two years. Your health care provider may check your blood pressure more often if you have high blood pressure.  · After age 20, have your cholesterol checked every 4-6 years. If you have risk factors for heart disease, you may need to have it checked more frequently. Treatment may be needed if your cholesterol is high.  · Have your body mass index (BMI) checked every year. Your health care provider can calculate your BMI from your height and weight.  · Work with your health care provider to lose weight, if needed, or to maintain a healthy weight.  Where to find more information:  · Centers for Disease Control and Prevention: www.cdc.gov/heartdisease  · American Heart Association: www.heart.org  ? Take  a free online heart disease risk quiz to better understand your personal risk factors.  Summary  · Heart disease is the leading cause of death in the world.  · Heart disease can cause chest pain, abnormal heart rhythms, heart attack, and heart failure.  · High blood pressure, high cholesterol, and smoking are the main risk factors for heart disease, although other factors also contribute.  · You can take actions to lower your chances of developing heart disease. Work with your health care provider to reduce your risk by following a heart-healthy diet, being physically active, and controlling your weight, blood pressure, and cholesterol level.  This information is not intended to replace advice given to you by your health care provider. Make sure you discuss any questions you have with your health care provider.  Document Released: 08/01/2005 Document Revised: 01/02/2019 Document Reviewed: 01/02/2019  Elsevier Patient Education © 2020 [employer]      Osteoporosis    Osteoporosis happens when your bones get thin and weak. This can cause your bones to break (fracture) more easily. You can do things at home to make your bones stronger.  Follow these instructions at home:    Activity  · Exercise as told by your doctor. Ask your doctor what activities are safe for you. You should do:  ? Exercises that make your muscles work to hold your body weight up (weight-bearing exercises). These include guy chi, yoga, and walking.  ? Exercises to make your muscles stronger. One example is lifting weights.  Lifestyle  · Limit alcohol intake to no more than 1 drink a day for nonpregnant women and 2 drinks a day for men. One drink equals 12 oz of beer, 5 oz of wine, or 1½ oz of hard liquor.  · Do not use any products that have nicotine or tobacco in them. These include cigarettes and e-cigarettes. If you need help quitting, ask your doctor.  Preventing falls  · Use tools to help you move around (mobility aids) as needed. These  include canes, walkers, scooters, and crutches.  · Keep rooms well-lit and free of clutter.  · Put away things that could make you trip. These include cords and rugs.  · Install safety rails on stairs. Install grab bars in bathrooms.  · Use rubber mats in slippery areas, like bathrooms.  · Wear shoes that:  ? Fit you well.  ? Support your feet.  ? Have closed toes.  ? Have rubber soles or low heels.  · Tell your doctor about all of the medicines you are taking. Some medicines can make you more likely to fall.  General instructions  · Eat plenty of calcium and vitamin D. These nutrients are good for your bones. Good sources of calcium and vitamin D include:  ? Some fatty fish, such as salmon and tuna.  ? Foods that have calcium and vitamin D added to them (fortified foods). For example, some breakfast cereals are fortified with calcium and vitamin D.  ? Egg yolks.  ? Cheese.  ? Liver.  · Take over-the-counter and prescription medicines only as told by your doctor.  · Keep all follow-up visits as told by your doctor. This is important.  Contact a doctor if:  · You have not been tested (screened) for osteoporosis and you are:  ? A woman who is age 65 or older.  ? A man who is age 70 or older.  Get help right away if:  · You fall.  · You get hurt.  Summary  · Osteoporosis happens when your bones get thin and weak.  · Weak bones can break (fracture) more easily.  · Eat plenty of calcium and vitamin D. These nutrients are good for your bones.  · Tell your doctor about all of the medicines that you take.  This information is not intended to replace advice given to you by your health care provider. Make sure you discuss any questions you have with your health care provider.  Document Released: 03/11/2013 Document Revised: 11/30/2018 Document Reviewed: 10/12/2018  Elsevier Patient Education © 2020 [employer]

## 2020-10-03 VITALS
RESPIRATION RATE: 14 BRPM | HEIGHT: 68 IN | TEMPERATURE: 96.9 F | OXYGEN SATURATION: 93 % | WEIGHT: 132 LBS | SYSTOLIC BLOOD PRESSURE: 124 MMHG | DIASTOLIC BLOOD PRESSURE: 64 MMHG | HEART RATE: 79 BPM | BODY MASS INDEX: 20 KG/M2

## 2020-10-03 RX ORDER — ASPIRIN 81 MG/1
81 TABLET ORAL DAILY
Qty: 90 TABLET | Refills: 3 | Status: SHIPPED | OUTPATIENT
Start: 2020-10-03 | End: 2022-05-31

## 2020-10-05 DIAGNOSIS — E78.49 OTHER HYPERLIPIDEMIA: ICD-10-CM

## 2020-10-05 DIAGNOSIS — F41.8 ANXIETY ASSOCIATED WITH DEPRESSION: ICD-10-CM

## 2020-10-05 DIAGNOSIS — I10 ESSENTIAL HYPERTENSION: ICD-10-CM

## 2020-10-05 RX ORDER — DULOXETIN HYDROCHLORIDE 60 MG/1
CAPSULE, DELAYED RELEASE ORAL
Qty: 120 CAPSULE | Refills: 3 | Status: SHIPPED | OUTPATIENT
Start: 2020-10-05 | End: 2021-05-18 | Stop reason: SDUPTHER

## 2020-10-05 RX ORDER — LISINOPRIL 10 MG/1
TABLET ORAL
Qty: 90 TABLET | Refills: 3 | Status: SHIPPED | OUTPATIENT
Start: 2020-10-05 | End: 2021-06-16

## 2020-10-05 RX ORDER — EZETIMIBE 10 MG/1
TABLET ORAL
Qty: 90 TABLET | Refills: 3 | Status: SHIPPED | OUTPATIENT
Start: 2020-10-05 | End: 2021-09-09 | Stop reason: SDUPTHER

## 2020-12-28 DIAGNOSIS — J44.9 MIXED TYPE COPD (CHRONIC OBSTRUCTIVE PULMONARY DISEASE) (HCC): ICD-10-CM

## 2020-12-28 DIAGNOSIS — E11.9 TYPE 2 DIABETES MELLITUS WITHOUT COMPLICATION, WITHOUT LONG-TERM CURRENT USE OF INSULIN (HCC): ICD-10-CM

## 2020-12-28 DIAGNOSIS — K21.9 GASTROESOPHAGEAL REFLUX DISEASE WITHOUT ESOPHAGITIS: ICD-10-CM

## 2020-12-29 RX ORDER — LANSOPRAZOLE 30 MG/1
CAPSULE, DELAYED RELEASE ORAL
Qty: 90 CAPSULE | Refills: 3 | Status: SHIPPED | OUTPATIENT
Start: 2020-12-29 | End: 2021-02-01

## 2020-12-29 RX ORDER — MONTELUKAST SODIUM 10 MG/1
10 TABLET ORAL DAILY
Qty: 90 TABLET | Refills: 3 | Status: SHIPPED | OUTPATIENT
Start: 2020-12-29 | End: 2021-01-09

## 2020-12-29 RX ORDER — BLOOD SUGAR DIAGNOSTIC
STRIP MISCELLANEOUS
Qty: 200 EACH | Refills: 5 | Status: SHIPPED | OUTPATIENT
Start: 2020-12-29 | End: 2021-11-01

## 2020-12-29 RX ORDER — LANCETS
EACH MISCELLANEOUS
Qty: 100 EACH | Refills: 5 | Status: SHIPPED | OUTPATIENT
Start: 2020-12-29 | End: 2021-09-09 | Stop reason: SDUPTHER

## 2021-01-08 DIAGNOSIS — J44.9 MIXED TYPE COPD (CHRONIC OBSTRUCTIVE PULMONARY DISEASE) (HCC): ICD-10-CM

## 2021-01-09 RX ORDER — MONTELUKAST SODIUM 10 MG/1
10 TABLET ORAL DAILY
Qty: 90 TABLET | Refills: 3 | Status: SHIPPED | OUTPATIENT
Start: 2021-01-09 | End: 2021-10-07

## 2021-02-01 DIAGNOSIS — K21.9 GASTROESOPHAGEAL REFLUX DISEASE WITHOUT ESOPHAGITIS: ICD-10-CM

## 2021-02-01 DIAGNOSIS — E11.9 TYPE 2 DIABETES MELLITUS WITHOUT COMPLICATION, WITHOUT LONG-TERM CURRENT USE OF INSULIN (HCC): ICD-10-CM

## 2021-02-01 RX ORDER — BLOOD-GLUCOSE METER
EACH MISCELLANEOUS
Qty: 1 KIT | Refills: 0 | Status: SHIPPED | OUTPATIENT
Start: 2021-02-01 | End: 2022-12-08 | Stop reason: SDUPTHER

## 2021-02-01 RX ORDER — LANSOPRAZOLE 30 MG/1
CAPSULE, DELAYED RELEASE ORAL
Qty: 90 CAPSULE | Refills: 3 | Status: SHIPPED | OUTPATIENT
Start: 2021-02-01 | End: 2021-10-07

## 2021-02-05 ENCOUNTER — OFFICE VISIT (OUTPATIENT)
Dept: FAMILY MEDICINE CLINIC | Facility: CLINIC | Age: 76
End: 2021-02-05

## 2021-02-05 DIAGNOSIS — I10 ESSENTIAL HYPERTENSION: ICD-10-CM

## 2021-02-05 DIAGNOSIS — J44.9 MIXED TYPE COPD (CHRONIC OBSTRUCTIVE PULMONARY DISEASE) (HCC): ICD-10-CM

## 2021-02-05 DIAGNOSIS — F41.8 ANXIETY ASSOCIATED WITH DEPRESSION: ICD-10-CM

## 2021-02-05 DIAGNOSIS — K21.9 GASTROESOPHAGEAL REFLUX DISEASE WITHOUT ESOPHAGITIS: ICD-10-CM

## 2021-02-05 DIAGNOSIS — Z85.828 HISTORY OF NONMELANOMA SKIN CANCER: ICD-10-CM

## 2021-02-05 DIAGNOSIS — Z00.00 HEALTHCARE MAINTENANCE: ICD-10-CM

## 2021-02-05 DIAGNOSIS — M85.89 OSTEOPENIA OF MULTIPLE SITES: ICD-10-CM

## 2021-02-05 DIAGNOSIS — E11.9 TYPE 2 DIABETES MELLITUS WITHOUT COMPLICATION, WITHOUT LONG-TERM CURRENT USE OF INSULIN (HCC): ICD-10-CM

## 2021-02-05 DIAGNOSIS — M79.605 PAIN IN BOTH LOWER EXTREMITIES: ICD-10-CM

## 2021-02-05 DIAGNOSIS — M79.604 PAIN IN BOTH LOWER EXTREMITIES: ICD-10-CM

## 2021-02-05 DIAGNOSIS — I25.10 CORONARY ARTERY CALCIFICATION SEEN ON CT SCAN: ICD-10-CM

## 2021-02-05 DIAGNOSIS — I70.0 ATHEROSCLEROSIS OF AORTA (HCC): ICD-10-CM

## 2021-02-05 DIAGNOSIS — M54.59 MECHANICAL LOW BACK PAIN: ICD-10-CM

## 2021-02-05 DIAGNOSIS — E78.2 MIXED HYPERLIPIDEMIA: Primary | ICD-10-CM

## 2021-02-05 DIAGNOSIS — N40.1 BENIGN PROSTATIC HYPERPLASIA WITH URINARY FREQUENCY: ICD-10-CM

## 2021-02-05 DIAGNOSIS — R35.0 BENIGN PROSTATIC HYPERPLASIA WITH URINARY FREQUENCY: ICD-10-CM

## 2021-02-05 PROCEDURE — 99214 OFFICE O/P EST MOD 30 MIN: CPT | Performed by: GENERAL PRACTICE

## 2021-02-05 RX ORDER — ROPINIROLE 0.5 MG/1
TABLET, FILM COATED ORAL
Qty: 60 TABLET | Refills: 0 | Status: SHIPPED | OUTPATIENT
Start: 2021-02-05 | End: 2021-03-22

## 2021-02-05 NOTE — PROGRESS NOTES
Subjective   Torin Granados is a 75 y.o. male.     History of Present Illness     Lower Extremity Discomfort  He gives a history of intermittent lower extremity discomfort dating from childhood.  He describes this as a tight ache centered about his knees.  Episodes occur primarily at night.  He admits to occasional knee stiffness but denies any swelling.  His wife states that he moves a lot in his sleep and he questions whether he has restless leg syndrome    Diabetes  Current symptoms include numbness and tingling of the feet. Patient denies visual disturbances, polydipsia, polyuria, hypoglycemia or any foot ulcerations. Evaluation to date has been: hemoglobin A1C. Home sugars: have remained at goal. Current treatments: metformin and GLP agonist -liraglutide and basal insulin-insulin degludec (together as xultophy 10 units daily). He continues to work on his diet and exercise plan    Dyslipidemia  Compliance with treatment remains quite good. He is prescribed simvastatin and ezetimibe with no apparent side effects    Hypertension  Home blood pressure readings: not doing. Associated signs and symptoms: none. Patient denies: chest pain, palpitations, dyspnea, orthopnea, paroxysmal nocturnal dyspnea or peripheral edema. Current antihypertensive medications includes lisinopril. Medication compliance: taking as prescribed.     BPH  He has noted a continued improvement in his nocturia, hesitancy, and stream since starting on tamsulosin.  There is no history of any daytime frequency, sense of incomplete voiding, dysuria, or hematuria.    Basal Cell Carcinoma  He underwent excision of an invasive nodular BCC at the right medial nasolabial crease on 7/15/2020 by Dr. Gill.  The margins were clear and he is quite pleased with the results.  He denies any new skin lesions    Imaging  Low-dose CT of the chest performed on 6/5/2020 was reported as showing moderate COPD, calcified mediastinal lymph nodes consistent with previous  granulomatous disease, mild coronary artery calcifications, and cholelithiasis.  He was advised a one-year follow-up    The following portions of the patient's history were reviewed and updated as appropriate: allergies, current medications, past medical history, past social history and problem list.    Review of Systems   Constitutional: Negative for appetite change, chills, fatigue, fever and unexpected weight change.   HENT: Positive for rhinorrhea and sneezing. Negative for congestion, ear pain, postnasal drip, sore throat and voice change.    Eyes: Negative for visual disturbance.   Respiratory: Negative for cough, shortness of breath and wheezing.    Cardiovascular: Negative for chest pain, palpitations and leg swelling.   Gastrointestinal: Negative for abdominal pain, blood in stool, constipation, diarrhea, nausea and vomiting.   Endocrine: Negative for polydipsia and polyuria.   Genitourinary: Negative for dysuria, hematuria and urgency.        Nocturia x4-5 with occasional hesitancy   Musculoskeletal: Positive for arthralgias and back pain. Negative for myalgias.   Skin: Negative for rash.   Neurological: Positive for numbness. Negative for tremors, weakness and headaches.   Psychiatric/Behavioral: Negative for dysphoric mood and sleep disturbance. The patient is not nervous/anxious.      Objective   Physical Exam  Constitutional:       General: He is not in acute distress.     Appearance: Normal appearance. He is well-developed. He is not ill-appearing or diaphoretic.      Comments: Bright and in good spirits. No apparent distress. No pallor, jaundice, diaphoresis, or cyanosis.   HENT:      Head: Atraumatic.      Right Ear: Tympanic membrane, ear canal and external ear normal.      Left Ear: Tympanic membrane, ear canal and external ear normal.   Eyes:      Conjunctiva/sclera: Conjunctivae normal.   Neck:      Thyroid: No thyroid mass or thyromegaly.      Vascular: No carotid bruit or JVD.      Trachea:  Trachea normal. No tracheal deviation.   Cardiovascular:      Rate and Rhythm: Normal rate and regular rhythm.      Heart sounds: Normal heart sounds, S1 normal and S2 normal. No murmur. No gallop. No S3 or S4 sounds.    Pulmonary:      Effort: Pulmonary effort is normal.      Breath sounds: Wheezing (diffuse - mild) present.      Comments: Pulmonary hyperinflation  Abdominal:      General: Bowel sounds are normal. There is no distension.   Musculoskeletal:      Right lower leg: No edema.      Left lower leg: No edema.      Comments: No peripheral joint redness or warmth.   Lymphadenopathy:      Head:      Right side of head: No submental, submandibular, tonsillar, preauricular, posterior auricular or occipital adenopathy.      Left side of head: No submental, submandibular, tonsillar, preauricular, posterior auricular or occipital adenopathy.      Cervical: No cervical adenopathy.      Upper Body:      Right upper body: No supraclavicular adenopathy.      Left upper body: No supraclavicular adenopathy.   Skin:     General: Skin is warm and dry.      Coloration: Skin is not cyanotic, jaundiced or pale.      Findings: No lesion or rash.      Nails: There is no clubbing.     Neurological:      Mental Status: He is alert and oriented to person, place, and time.      Cranial Nerves: No cranial nerve deficit.      Sensory: Sensory deficit (decreased vibration sense toes of both feet) present.      Motor: No tremor.      Coordination: Coordination normal.      Gait: Gait normal.   Psychiatric:         Attention and Perception: Attention normal.         Mood and Affect: Mood normal.         Speech: Speech normal.         Behavior: Behavior normal.       Assessment/Plan   Problems Addressed this Visit        Cardiac and Vasculature    Atherosclerosis of aorta (CMS/HCC)  Reminded regarding risk factor modification with an emphasis on tobacco cessation.  Continue current medication    Coronary artery calcification seen on CT  scan  As above.    Essential hypertension   Hypertension: at goal. Evidence of target organ damage: atherosclerosis of the aorta along with coronary artery calcifications on previous imaging.  Encouraged to continue to work on diet and exercise plan.   Continue current medication    Mixed hyperlipidemia   As above.   Continue current medication.       Endocrine and Metabolic    Type 2 diabetes mellitus without complication, without long-term current use of insulin (CMS/[facility])  Diabetes mellitus Type II, under excellent control.   Encouraged to continue to pursue ADA diet  Encouraged aerobic exercise.  Continue current medication  Updated labs will be drawn at his return.       Gastrointestinal Abdominal     Gastroesophageal reflux disease without esophagitis       Genitourinary and Reproductive     Benign prostatic hyperplasia with urinary frequency  Continue current medication  Encouraged to report if any worse or if any new symptoms or concerns.       Health Encounters    Healthcare maintenance  Encouraged to obtain a COVID-19 immunization when available.       Hematology and Neoplasia    History of nonmelanoma skin cancer  Reminded regarding solar protection  Encouraged report if any skin changes       Mental Health    Anxiety associated with depression  Stable.  Supportive therapy.   Continue current medication.       Musculoskeletal and Injuries    Mechanical low back pain    Osteopenia of multiple sites  We will arrange an updated DEXA scan at his return we will arrange an DEXA scan at his return    Pain in both lower extremities  Atypical.  Reviewed options and agreed on a trial of ropinirole  Encouraged to report if any worse or if any new symptoms or concerns.    Relevant Medications    rOPINIRole (REQUIP) 0.5 MG tablet       Pulmonary and Pneumonias    Mixed type COPD (chronic obstructive pulmonary disease) (CMS/[facility])   COPD is stable.  Reminded of the importance of smoking cessation  Encouraged to remain as  active as symptoms allow for      Diagnoses       Codes Comments    Mixed hyperlipidemia    -  Primary ICD-10-CM: E78.2  ICD-9-CM: 272.2     Essential hypertension     ICD-10-CM: I10  ICD-9-CM: 401.9     Coronary artery calcification seen on CT scan     ICD-10-CM: I25.10  ICD-9-CM: 414.00     Atherosclerosis of aorta (CMS/[facility])     ICD-10-CM: I70.0  ICD-9-CM: 440.0     Type 2 diabetes mellitus without complication, without long-term current use of insulin (CMS/[facility])     ICD-10-CM: E11.9  ICD-9-CM: 250.00     Gastroesophageal reflux disease without esophagitis     ICD-10-CM: K21.9  ICD-9-CM: 530.81     Benign prostatic hyperplasia with urinary frequency     ICD-10-CM: N40.1, R35.0  ICD-9-CM: 600.01, 788.41     Healthcare maintenance     ICD-10-CM: Z00.00  ICD-9-CM: V70.0     Anxiety associated with depression     ICD-10-CM: F41.8  ICD-9-CM: 300.4     Osteopenia of multiple sites     ICD-10-CM: M85.89  ICD-9-CM: 733.90     Mechanical low back pain     ICD-10-CM: M54.5  ICD-9-CM: 724.2     Mixed type COPD (chronic obstructive pulmonary disease) (CMS/[facility])     ICD-10-CM: J44.9  ICD-9-CM: 496     Pain in both lower extremities     ICD-10-CM: M79.604, M79.605  ICD-9-CM: 729.5     History of nonmelanoma skin cancer     ICD-10-CM: Z85.828  ICD-9-CM: V10.83

## 2021-02-06 VITALS
OXYGEN SATURATION: 93 % | DIASTOLIC BLOOD PRESSURE: 64 MMHG | BODY MASS INDEX: 21.22 KG/M2 | HEART RATE: 92 BPM | SYSTOLIC BLOOD PRESSURE: 126 MMHG | HEIGHT: 68 IN | WEIGHT: 140 LBS | TEMPERATURE: 97.1 F | RESPIRATION RATE: 14 BRPM

## 2021-02-06 PROBLEM — Z85.828 HISTORY OF NONMELANOMA SKIN CANCER: Status: ACTIVE | Noted: 2021-02-06

## 2021-02-06 PROBLEM — C44.310 BASAL CELL CARCINOMA (BCC) OF SKIN OF FACE: Status: RESOLVED | Noted: 2020-02-01 | Resolved: 2021-02-06

## 2021-02-12 DIAGNOSIS — Z23 IMMUNIZATION DUE: ICD-10-CM

## 2021-02-16 ENCOUNTER — IMMUNIZATION (OUTPATIENT)
Dept: VACCINE CLINIC | Facility: HOSPITAL | Age: 76
End: 2021-02-16

## 2021-02-16 DIAGNOSIS — Z23 IMMUNIZATION DUE: ICD-10-CM

## 2021-02-16 PROCEDURE — 0001A: CPT | Performed by: INTERNAL MEDICINE

## 2021-02-16 PROCEDURE — 91300 HC SARSCOV02 VAC 30MCG/0.3ML IM: CPT | Performed by: INTERNAL MEDICINE

## 2021-03-09 ENCOUNTER — IMMUNIZATION (OUTPATIENT)
Dept: VACCINE CLINIC | Facility: HOSPITAL | Age: 76
End: 2021-03-09

## 2021-03-09 PROCEDURE — 91300 HC SARSCOV02 VAC 30MCG/0.3ML IM: CPT | Performed by: INTERNAL MEDICINE

## 2021-03-09 PROCEDURE — 0002A: CPT | Performed by: INTERNAL MEDICINE

## 2021-03-22 DIAGNOSIS — E11.9 TYPE 2 DIABETES MELLITUS WITHOUT COMPLICATION, WITHOUT LONG-TERM CURRENT USE OF INSULIN (HCC): ICD-10-CM

## 2021-03-22 DIAGNOSIS — E78.49 OTHER HYPERLIPIDEMIA: ICD-10-CM

## 2021-03-22 DIAGNOSIS — M79.604 PAIN IN BOTH LOWER EXTREMITIES: ICD-10-CM

## 2021-03-22 DIAGNOSIS — M79.605 PAIN IN BOTH LOWER EXTREMITIES: ICD-10-CM

## 2021-03-22 RX ORDER — (INSULIN DEGLUDEC AND LIRAGLUTIDE) 100; 3.6 [IU]/ML; MG/ML
INJECTION, SOLUTION SUBCUTANEOUS
Qty: 15 ML | Refills: 3 | Status: SHIPPED | OUTPATIENT
Start: 2021-03-22 | End: 2021-03-31 | Stop reason: SDUPTHER

## 2021-03-22 RX ORDER — PEN NEEDLE, DIABETIC 32GX 5/32"
NEEDLE, DISPOSABLE MISCELLANEOUS
Qty: 30 EACH | Refills: 0 | Status: SHIPPED | OUTPATIENT
Start: 2021-03-22 | End: 2021-03-31 | Stop reason: SDUPTHER

## 2021-03-22 RX ORDER — SIMVASTATIN 20 MG
20 TABLET ORAL NIGHTLY
Qty: 90 TABLET | Refills: 3 | Status: SHIPPED | OUTPATIENT
Start: 2021-03-22 | End: 2021-03-31 | Stop reason: SDUPTHER

## 2021-03-22 RX ORDER — ROPINIROLE 0.5 MG/1
TABLET, FILM COATED ORAL
Qty: 60 TABLET | Refills: 0 | Status: SHIPPED | OUTPATIENT
Start: 2021-03-22 | End: 2021-03-31 | Stop reason: SDUPTHER

## 2021-03-31 DIAGNOSIS — E11.9 TYPE 2 DIABETES MELLITUS WITHOUT COMPLICATION, WITHOUT LONG-TERM CURRENT USE OF INSULIN (HCC): ICD-10-CM

## 2021-03-31 DIAGNOSIS — M79.605 PAIN IN BOTH LOWER EXTREMITIES: ICD-10-CM

## 2021-03-31 DIAGNOSIS — M79.604 PAIN IN BOTH LOWER EXTREMITIES: ICD-10-CM

## 2021-03-31 DIAGNOSIS — E78.49 OTHER HYPERLIPIDEMIA: ICD-10-CM

## 2021-03-31 RX ORDER — ROPINIROLE 0.5 MG/1
1 TABLET, FILM COATED ORAL NIGHTLY
Qty: 180 TABLET | Refills: 3 | Status: SHIPPED | OUTPATIENT
Start: 2021-03-31 | End: 2022-03-23

## 2021-03-31 RX ORDER — (INSULIN DEGLUDEC AND LIRAGLUTIDE) 100; 3.6 [IU]/ML; MG/ML
10 INJECTION, SOLUTION SUBCUTANEOUS DAILY
Qty: 15 ML | Refills: 3 | Status: SHIPPED | OUTPATIENT
Start: 2021-03-31 | End: 2022-03-16 | Stop reason: SDUPTHER

## 2021-03-31 RX ORDER — SIMVASTATIN 20 MG
20 TABLET ORAL NIGHTLY
Qty: 90 TABLET | Refills: 3 | Status: SHIPPED | OUTPATIENT
Start: 2021-03-31 | End: 2022-06-15

## 2021-03-31 RX ORDER — PEN NEEDLE, DIABETIC 32GX 5/32"
1 NEEDLE, DISPOSABLE MISCELLANEOUS DAILY
Qty: 90 EACH | Refills: 3 | Status: SHIPPED | OUTPATIENT
Start: 2021-03-31 | End: 2022-03-23

## 2021-05-07 ENCOUNTER — OFFICE VISIT (OUTPATIENT)
Dept: FAMILY MEDICINE CLINIC | Facility: CLINIC | Age: 76
End: 2021-05-07

## 2021-05-07 DIAGNOSIS — F17.200 SMOKER: ICD-10-CM

## 2021-05-07 DIAGNOSIS — G25.81 RLS (RESTLESS LEGS SYNDROME): ICD-10-CM

## 2021-05-07 DIAGNOSIS — R35.0 BENIGN PROSTATIC HYPERPLASIA WITH URINARY FREQUENCY: ICD-10-CM

## 2021-05-07 DIAGNOSIS — I70.0 ATHEROSCLEROSIS OF AORTA (HCC): ICD-10-CM

## 2021-05-07 DIAGNOSIS — I10 ESSENTIAL HYPERTENSION: ICD-10-CM

## 2021-05-07 DIAGNOSIS — E78.2 MIXED HYPERLIPIDEMIA: Primary | ICD-10-CM

## 2021-05-07 DIAGNOSIS — M54.59 MECHANICAL LOW BACK PAIN: ICD-10-CM

## 2021-05-07 DIAGNOSIS — J44.9 MIXED TYPE COPD (CHRONIC OBSTRUCTIVE PULMONARY DISEASE) (HCC): ICD-10-CM

## 2021-05-07 DIAGNOSIS — Z85.828 HISTORY OF NONMELANOMA SKIN CANCER: ICD-10-CM

## 2021-05-07 DIAGNOSIS — Z00.00 HEALTHCARE MAINTENANCE: ICD-10-CM

## 2021-05-07 DIAGNOSIS — E11.9 TYPE 2 DIABETES MELLITUS WITHOUT COMPLICATION, WITHOUT LONG-TERM CURRENT USE OF INSULIN (HCC): ICD-10-CM

## 2021-05-07 DIAGNOSIS — N40.1 BENIGN PROSTATIC HYPERPLASIA WITH URINARY FREQUENCY: ICD-10-CM

## 2021-05-07 DIAGNOSIS — K21.9 GASTROESOPHAGEAL REFLUX DISEASE WITHOUT ESOPHAGITIS: ICD-10-CM

## 2021-05-07 DIAGNOSIS — I25.10 CORONARY ARTERY CALCIFICATION SEEN ON CT SCAN: ICD-10-CM

## 2021-05-07 DIAGNOSIS — M85.89 OSTEOPENIA OF MULTIPLE SITES: ICD-10-CM

## 2021-05-07 DIAGNOSIS — F41.8 ANXIETY ASSOCIATED WITH DEPRESSION: ICD-10-CM

## 2021-05-07 PROCEDURE — 82306 VITAMIN D 25 HYDROXY: CPT | Performed by: GENERAL PRACTICE

## 2021-05-07 PROCEDURE — 83036 HEMOGLOBIN GLYCOSYLATED A1C: CPT | Performed by: GENERAL PRACTICE

## 2021-05-07 PROCEDURE — 99214 OFFICE O/P EST MOD 30 MIN: CPT | Performed by: GENERAL PRACTICE

## 2021-05-07 PROCEDURE — 82607 VITAMIN B-12: CPT | Performed by: GENERAL PRACTICE

## 2021-05-07 PROCEDURE — 82043 UR ALBUMIN QUANTITATIVE: CPT | Performed by: GENERAL PRACTICE

## 2021-05-07 PROCEDURE — 80053 COMPREHEN METABOLIC PANEL: CPT | Performed by: GENERAL PRACTICE

## 2021-05-07 PROCEDURE — 80061 LIPID PANEL: CPT | Performed by: GENERAL PRACTICE

## 2021-05-07 PROCEDURE — 84443 ASSAY THYROID STIM HORMONE: CPT | Performed by: GENERAL PRACTICE

## 2021-05-07 PROCEDURE — 85025 COMPLETE CBC W/AUTO DIFF WBC: CPT | Performed by: GENERAL PRACTICE

## 2021-05-07 RX ORDER — FINASTERIDE 5 MG/1
5 TABLET, FILM COATED ORAL DAILY
Qty: 90 TABLET | Refills: 3 | Status: SHIPPED | OUTPATIENT
Start: 2021-05-07 | End: 2022-03-21

## 2021-05-08 VITALS
SYSTOLIC BLOOD PRESSURE: 124 MMHG | HEIGHT: 68 IN | WEIGHT: 134 LBS | RESPIRATION RATE: 14 BRPM | TEMPERATURE: 97.1 F | BODY MASS INDEX: 20.31 KG/M2 | OXYGEN SATURATION: 95 % | DIASTOLIC BLOOD PRESSURE: 68 MMHG | HEART RATE: 88 BPM

## 2021-05-08 PROBLEM — G25.81 RLS (RESTLESS LEGS SYNDROME): Status: ACTIVE | Noted: 2021-02-05

## 2021-05-08 LAB
25(OH)D3 SERPL-MCNC: 41.2 NG/ML (ref 30–100)
ALBUMIN SERPL-MCNC: 4.5 G/DL (ref 3.5–5.2)
ALBUMIN UR-MCNC: <1.2 MG/DL
ALBUMIN/GLOB SERPL: 1.6 G/DL
ALP SERPL-CCNC: 64 U/L (ref 39–117)
ALT SERPL W P-5'-P-CCNC: 19 U/L (ref 1–41)
ANION GAP SERPL CALCULATED.3IONS-SCNC: 13.8 MMOL/L (ref 5–15)
AST SERPL-CCNC: 18 U/L (ref 1–40)
BASOPHILS # BLD AUTO: 0.02 10*3/MM3 (ref 0–0.2)
BASOPHILS NFR BLD AUTO: 0.3 % (ref 0–1.5)
BILIRUB SERPL-MCNC: 0.4 MG/DL (ref 0–1.2)
BUN SERPL-MCNC: 9 MG/DL (ref 8–23)
BUN/CREAT SERPL: 13.2 (ref 7–25)
CALCIUM SPEC-SCNC: 9.6 MG/DL (ref 8.6–10.5)
CHLORIDE SERPL-SCNC: 99 MMOL/L (ref 98–107)
CHOLEST SERPL-MCNC: 143 MG/DL (ref 0–200)
CO2 SERPL-SCNC: 23.2 MMOL/L (ref 22–29)
CREAT SERPL-MCNC: 0.68 MG/DL (ref 0.76–1.27)
DEPRECATED RDW RBC AUTO: 43.3 FL (ref 37–54)
EOSINOPHIL # BLD AUTO: 0.03 10*3/MM3 (ref 0–0.4)
EOSINOPHIL NFR BLD AUTO: 0.4 % (ref 0.3–6.2)
ERYTHROCYTE [DISTWIDTH] IN BLOOD BY AUTOMATED COUNT: 13 % (ref 12.3–15.4)
GFR SERPL CREATININE-BSD FRML MDRD: 114 ML/MIN/1.73
GLOBULIN UR ELPH-MCNC: 2.8 GM/DL
GLUCOSE SERPL-MCNC: 108 MG/DL (ref 65–99)
HBA1C MFR BLD: 7.3 % (ref 4.8–5.6)
HCT VFR BLD AUTO: 48 % (ref 37.5–51)
HDLC SERPL-MCNC: 61 MG/DL (ref 40–60)
HGB BLD-MCNC: 16.1 G/DL (ref 13–17.7)
IMM GRANULOCYTES # BLD AUTO: 0.02 10*3/MM3 (ref 0–0.05)
IMM GRANULOCYTES NFR BLD AUTO: 0.3 % (ref 0–0.5)
LDLC SERPL CALC-MCNC: 68 MG/DL (ref 0–100)
LDLC/HDLC SERPL: 1.12 {RATIO}
LYMPHOCYTES # BLD AUTO: 2.02 10*3/MM3 (ref 0.7–3.1)
LYMPHOCYTES NFR BLD AUTO: 25.5 % (ref 19.6–45.3)
MCH RBC QN AUTO: 30.8 PG (ref 26.6–33)
MCHC RBC AUTO-ENTMCNC: 33.5 G/DL (ref 31.5–35.7)
MCV RBC AUTO: 92 FL (ref 79–97)
MONOCYTES # BLD AUTO: 0.48 10*3/MM3 (ref 0.1–0.9)
MONOCYTES NFR BLD AUTO: 6.1 % (ref 5–12)
NEUTROPHILS NFR BLD AUTO: 5.35 10*3/MM3 (ref 1.7–7)
NEUTROPHILS NFR BLD AUTO: 67.4 % (ref 42.7–76)
NRBC BLD AUTO-RTO: 0 /100 WBC (ref 0–0.2)
PLATELET # BLD AUTO: 258 10*3/MM3 (ref 140–450)
PMV BLD AUTO: 9.8 FL (ref 6–12)
POTASSIUM SERPL-SCNC: 5 MMOL/L (ref 3.5–5.2)
PROT SERPL-MCNC: 7.3 G/DL (ref 6–8.5)
RBC # BLD AUTO: 5.22 10*6/MM3 (ref 4.14–5.8)
SODIUM SERPL-SCNC: 136 MMOL/L (ref 136–145)
TRIGL SERPL-MCNC: 68 MG/DL (ref 0–150)
TSH SERPL DL<=0.05 MIU/L-ACNC: 1.4 UIU/ML (ref 0.27–4.2)
VIT B12 BLD-MCNC: 397 PG/ML (ref 211–946)
VLDLC SERPL-MCNC: 14 MG/DL (ref 5–40)
WBC # BLD AUTO: 7.92 10*3/MM3 (ref 3.4–10.8)

## 2021-05-17 DIAGNOSIS — F41.8 ANXIETY ASSOCIATED WITH DEPRESSION: ICD-10-CM

## 2021-05-18 DIAGNOSIS — Z87.891 PERSONAL HISTORY OF NICOTINE DEPENDENCE: Primary | ICD-10-CM

## 2021-05-18 DIAGNOSIS — F17.200 CURRENT SMOKER: ICD-10-CM

## 2021-05-18 RX ORDER — DULOXETIN HYDROCHLORIDE 60 MG/1
CAPSULE, DELAYED RELEASE ORAL
Qty: 180 CAPSULE | Refills: 3 | Status: SHIPPED | OUTPATIENT
Start: 2021-05-18 | End: 2022-05-31

## 2021-06-09 ENCOUNTER — HOSPITAL ENCOUNTER (OUTPATIENT)
Dept: CT IMAGING | Facility: HOSPITAL | Age: 76
Discharge: HOME | End: 2021-06-09
Admitting: GENERAL PRACTICE

## 2021-06-09 DIAGNOSIS — F17.200 CURRENT SMOKER: ICD-10-CM

## 2021-06-09 DIAGNOSIS — Z87.891 PERSONAL HISTORY OF NICOTINE DEPENDENCE: ICD-10-CM

## 2021-06-09 PROCEDURE — 71271 CT THORAX LUNG CANCER SCR C-: CPT | Performed by: RADIOLOGY

## 2021-06-09 PROCEDURE — 71271 CT THORAX LUNG CANCER SCR C-: CPT

## 2021-06-10 NOTE — PROGRESS NOTES
Sent Dakimt message    -- Please let patient know that low dose CT OK. Will be due for next in one year.

## 2021-06-16 DIAGNOSIS — I10 ESSENTIAL HYPERTENSION: ICD-10-CM

## 2021-06-16 RX ORDER — LISINOPRIL 10 MG/1
TABLET ORAL
Qty: 90 TABLET | Refills: 3 | Status: SHIPPED | OUTPATIENT
Start: 2021-06-16 | End: 2022-06-22

## 2021-09-09 DIAGNOSIS — R35.0 BENIGN PROSTATIC HYPERPLASIA WITH URINARY FREQUENCY: ICD-10-CM

## 2021-09-09 DIAGNOSIS — E78.49 OTHER HYPERLIPIDEMIA: ICD-10-CM

## 2021-09-09 DIAGNOSIS — E11.9 TYPE 2 DIABETES MELLITUS WITHOUT COMPLICATION, WITHOUT LONG-TERM CURRENT USE OF INSULIN (HCC): ICD-10-CM

## 2021-09-09 DIAGNOSIS — N40.1 BENIGN PROSTATIC HYPERPLASIA WITH URINARY FREQUENCY: ICD-10-CM

## 2021-09-09 RX ORDER — EZETIMIBE 10 MG/1
TABLET ORAL
Qty: 90 TABLET | Refills: 3 | Status: SHIPPED | OUTPATIENT
Start: 2021-09-09 | End: 2022-08-01

## 2021-09-09 RX ORDER — TAMSULOSIN HYDROCHLORIDE 0.4 MG/1
1 CAPSULE ORAL NIGHTLY
Qty: 90 CAPSULE | Refills: 3 | Status: SHIPPED | OUTPATIENT
Start: 2021-09-09 | End: 2022-06-16

## 2021-09-09 RX ORDER — LANCETS
EACH MISCELLANEOUS
Qty: 200 EACH | Refills: 5 | Status: SHIPPED | OUTPATIENT
Start: 2021-09-09 | End: 2021-11-01

## 2021-09-14 ENCOUNTER — OFFICE VISIT (OUTPATIENT)
Dept: FAMILY MEDICINE CLINIC | Facility: CLINIC | Age: 76
End: 2021-09-14

## 2021-09-14 DIAGNOSIS — Z00.00 HEALTHCARE MAINTENANCE: ICD-10-CM

## 2021-09-14 DIAGNOSIS — F17.200 SMOKER: ICD-10-CM

## 2021-09-14 DIAGNOSIS — E11.9 TYPE 2 DIABETES MELLITUS WITHOUT COMPLICATION, WITHOUT LONG-TERM CURRENT USE OF INSULIN (HCC): ICD-10-CM

## 2021-09-14 DIAGNOSIS — K21.9 GASTROESOPHAGEAL REFLUX DISEASE WITHOUT ESOPHAGITIS: ICD-10-CM

## 2021-09-14 DIAGNOSIS — I70.0 ATHEROSCLEROSIS OF AORTA (HCC): ICD-10-CM

## 2021-09-14 DIAGNOSIS — I10 ESSENTIAL HYPERTENSION: ICD-10-CM

## 2021-09-14 DIAGNOSIS — M85.89 OSTEOPENIA OF MULTIPLE SITES: ICD-10-CM

## 2021-09-14 DIAGNOSIS — I25.10 CORONARY ARTERY CALCIFICATION SEEN ON CT SCAN: ICD-10-CM

## 2021-09-14 DIAGNOSIS — Z86.010 HISTORY OF COLONIC POLYPS: ICD-10-CM

## 2021-09-14 DIAGNOSIS — F41.8 ANXIETY ASSOCIATED WITH DEPRESSION: ICD-10-CM

## 2021-09-14 DIAGNOSIS — Z23 ENCOUNTER FOR IMMUNIZATION: ICD-10-CM

## 2021-09-14 DIAGNOSIS — R35.0 BENIGN PROSTATIC HYPERPLASIA WITH URINARY FREQUENCY: ICD-10-CM

## 2021-09-14 DIAGNOSIS — N40.1 BENIGN PROSTATIC HYPERPLASIA WITH URINARY FREQUENCY: ICD-10-CM

## 2021-09-14 DIAGNOSIS — M54.59 MECHANICAL LOW BACK PAIN: ICD-10-CM

## 2021-09-14 DIAGNOSIS — G25.81 RLS (RESTLESS LEGS SYNDROME): ICD-10-CM

## 2021-09-14 DIAGNOSIS — E29.1 ANDROGEN DEFICIENCY: ICD-10-CM

## 2021-09-14 DIAGNOSIS — E78.2 MIXED HYPERLIPIDEMIA: Primary | ICD-10-CM

## 2021-09-14 DIAGNOSIS — Z85.828 HISTORY OF NONMELANOMA SKIN CANCER: ICD-10-CM

## 2021-09-14 DIAGNOSIS — J44.9 MIXED TYPE COPD (CHRONIC OBSTRUCTIVE PULMONARY DISEASE) (HCC): ICD-10-CM

## 2021-09-14 PROCEDURE — 99214 OFFICE O/P EST MOD 30 MIN: CPT | Performed by: GENERAL PRACTICE

## 2021-09-14 PROCEDURE — G0008 ADMIN INFLUENZA VIRUS VAC: HCPCS | Performed by: GENERAL PRACTICE

## 2021-09-14 PROCEDURE — 90686 IIV4 VACC NO PRSV 0.5 ML IM: CPT | Performed by: GENERAL PRACTICE

## 2021-09-14 NOTE — PROGRESS NOTES
Subjective   Torin Granados is a 75 y.o. male.     Chief Complaint  He returns for a scheduled reassessment of multiple medical problems including type II DM, dyslipidemia, hypertension, BPH, and restless leg syndrome    History of Present Illness     Diabetes  Current symptoms include numbness and tingling of the feet.  He continues to deny any visual disturbances, polydipsia, polyuria, hypoglycemia or foot ulcerations. Evaluation to date has been: hemoglobin A1C. Home sugars: have remained at goal. Current treatments: metformin and GLP agonist -liraglutide and basal insulin (together as xultophy 10 units daily). He continues to work on his diet and exercise plan.  His last diabetic eye exam was more than 2 years ago  Lab Results   Component Value Date    HGBA1C 7.30 (H) 05/07/2021     Lab Results   Component Value Date    MICROALBUR <1.2 05/07/2021     Dyslipidemia  Compliance with treatment remains quite good. He is prescribed simvastatin and ezetimibe with no apparent side effects  Lab Results   Component Value Date    CHOL 143 05/07/2021    CHLPL 128 09/14/2020    TRIG 68 05/07/2021    HDL 61 (H) 05/07/2021    LDL 68 05/07/2021     Hypertension  Home blood pressure readings: not doing. Associated signs and symptoms: none.  He continues to deny any chest pain, palpitations, dyspnea, orthopnea, paroxysmal nocturnal dyspnea or peripheral edema. Current antihypertensive medications includes lisinopril.   Lab Results   Component Value Date    GLUCOSE 108 (H) 05/07/2021    BUN 9 05/07/2021    CREATININE 0.68 (L) 05/07/2021    EGFRIFNONA 114 05/07/2021    EGFRIFAFRI 110 09/14/2020    BCR 13.2 05/07/2021    K 5.0 05/07/2021    CO2 23.2 05/07/2021    CALCIUM 9.6 05/07/2021    PROTENTOTREF 5.9 (L) 09/14/2020    ALBUMIN 4.50 05/07/2021    LABIL2 2.7 09/14/2020    AST 18 05/07/2021    ALT 19 05/07/2021     BPH  He has noted some improvement in his nocturia, hesitancy, and stream since last here.  There is no history of any  daytime frequency, sense of incomplete voiding, dysuria, or hematuria.  He is currently on finasteride and tamsulosin with no apparent side effects    Restless Leg Syndrome  He gives a history of intermittent lower extremity discomfort dating from childhood.  He describes this as a tight ache centered about his knees.  Episodes occur primarily at night.  He admits to occasional knee stiffness but denies any swelling.  He remains on ropinirole 0.5 nightly with a dramatic improvement in his symptoms and no apparent side effects thus far    Labs  Most recent vitamin D 41.2 with a B12 of 397    Imaging  Low-dose CT of the chest performed on 6/9/2021 revealed 3 small stable lung nodules, moderate COPD with mild fibrosis, atherosclerosis of the aorta, mild coronary artery calcifications, mild cardiomegaly, and cholelithiasis    The following portions of the patient's history were reviewed and updated as appropriate: allergies, current medications, past medical history, past social history and problem list.    Review of Systems   Constitutional: Negative for appetite change, chills, fatigue, fever and unexpected weight change.   HENT: Positive for rhinorrhea and sneezing. Negative for congestion, ear pain, postnasal drip, sore throat and voice change.    Eyes: Negative for visual disturbance.   Respiratory: Negative for cough, shortness of breath and wheezing.    Cardiovascular: Negative for chest pain, palpitations and leg swelling.   Gastrointestinal: Negative for abdominal pain, blood in stool, constipation, diarrhea, nausea and vomiting.   Endocrine: Negative for polydipsia and polyuria.   Genitourinary: Negative for dysuria, hematuria and urgency.        Nocturia x 2-3 with occasional hesitancy   Musculoskeletal: Positive for arthralgias and back pain. Negative for myalgias.   Skin: Negative for rash.   Neurological: Positive for numbness. Negative for tremors, weakness and headaches.   Psychiatric/Behavioral:  Negative for dysphoric mood and sleep disturbance. The patient is not nervous/anxious.      Objective   Physical Exam  Constitutional:       General: He is not in acute distress.     Appearance: Normal appearance. He is well-developed. He is not ill-appearing or diaphoretic.      Comments: Bright and in good spirits. No apparent distress. No pallor, jaundice, diaphoresis, or cyanosis.   HENT:      Head: Atraumatic.      Right Ear: Tympanic membrane, ear canal and external ear normal.      Left Ear: Tympanic membrane, ear canal and external ear normal.   Eyes:      Conjunctiva/sclera: Conjunctivae normal.   Neck:      Thyroid: No thyroid mass or thyromegaly.      Vascular: No carotid bruit or JVD.      Trachea: Trachea normal. No tracheal deviation.   Cardiovascular:      Rate and Rhythm: Normal rate and regular rhythm.      Heart sounds: Normal heart sounds, S1 normal and S2 normal. No murmur heard.   No gallop. No S3 or S4 sounds.    Pulmonary:      Effort: Pulmonary effort is normal.      Breath sounds: Wheezing (diffuse - mild) present.      Comments: Pulmonary hyperinflation  Abdominal:      General: Bowel sounds are normal. There is no distension.   Musculoskeletal:      Right lower leg: No edema.      Left lower leg: No edema.      Comments: No peripheral joint redness or warmth.   Lymphadenopathy:      Head:      Right side of head: No submental, submandibular, tonsillar, preauricular, posterior auricular or occipital adenopathy.      Left side of head: No submental, submandibular, tonsillar, preauricular, posterior auricular or occipital adenopathy.      Cervical: No cervical adenopathy.      Upper Body:      Right upper body: No supraclavicular adenopathy.      Left upper body: No supraclavicular adenopathy.   Skin:     General: Skin is warm and dry.      Coloration: Skin is not cyanotic, jaundiced or pale.      Findings: No lesion or rash.      Nails: There is no clubbing.   Neurological:      Mental  Status: He is alert and oriented to person, place, and time.      Cranial Nerves: No cranial nerve deficit.      Sensory: Sensory deficit (decreased vibration sense toes of both feet) present.      Motor: No tremor.      Coordination: Coordination normal.      Gait: Gait normal.   Psychiatric:         Attention and Perception: Attention normal.         Mood and Affect: Mood normal.         Speech: Speech normal.         Behavior: Behavior normal.         Thought Content: Thought content normal.       Assessment/Plan   Problems Addressed this Visit        Cardiac and Vasculature    Atherosclerosis of aorta (CMS/[facility])  Reminded regarding risk factor modification with an emphasis on tobacco cessation.  Continue ASA    Coronary artery calcification seen on CT scan  As above.    Essential hypertension   Hypertension: at goal. Evidence of target organ damage: coronary artery calcifications and atherosclerosis of the aorta on imaging.  Encouraged to continue to work on diet and exercise plan.   Continue current medication    Mixed hyperlipidemia  As above.   Continue current medication.       Endocrine and Metabolic    Androgen deficiency    Type 2 diabetes mellitus without complication, without long-term current use of insulin (CMS/[facility])  Diabetes mellitus Type II, under good control.   Encouraged to continue to pursue ADA diet  Encouraged aerobic exercise.  Continue current medication  Will arrange an updated diabetic eye exam  Updated labs will be drawn at his return.    Relevant Orders    Ambulatory Referral to Ophthalmology       Gastrointestinal Abdominal     Gastroesophageal reflux disease without esophagitis    History of colonic polyps  We will discuss an updated colonoscopy at his return       Genitourinary and Reproductive     Benign prostatic hyperplasia with urinary frequency  Continue current medication  Encouraged to report if any worse or if any new symptoms or concerns.       Health Encounters    Healthcare  maintenance  Recommended a flu shot    Relevant Orders    FluLaval >6 Months (Completed)       Hematology and Neoplasia    History of nonmelanoma skin cancer       Mental Health    Anxiety associated with depression       Musculoskeletal and Injuries    Mechanical low back pain    Osteopenia of multiple sites  We will plan on updating a DEXA scan at the time of his low-dose CT of the chest next year       Neuro    RLS (restless legs syndrome)  Improved  Encouraged to report if this should change.  Continue current medication       Pulmonary and Pneumonias    Mixed type COPD (chronic obstructive pulmonary disease) (CMS/HCC)   COPD is stable.  Reminded of the importance of smoking cessation  Encouraged to remain as active as symptoms allow for       Tobacco    Smoker      Diagnoses       Codes Comments    Mixed hyperlipidemia    -  Primary ICD-10-CM: E78.2  ICD-9-CM: 272.2     Essential hypertension     ICD-10-CM: I10  ICD-9-CM: 401.9     Coronary artery calcification seen on CT scan     ICD-10-CM: I25.10  ICD-9-CM: 414.00     Atherosclerosis of aorta (CMS/HCC)     ICD-10-CM: I70.0  ICD-9-CM: 440.0     Type 2 diabetes mellitus without complication, without long-term current use of insulin (CMS/[facility])     ICD-10-CM: E11.9  ICD-9-CM: 250.00     Androgen deficiency     ICD-10-CM: E29.1  ICD-9-CM: 257.2     History of colonic polyps     ICD-10-CM: Z86.010  ICD-9-CM: V12.72     Benign prostatic hyperplasia with urinary frequency     ICD-10-CM: N40.1, R35.0  ICD-9-CM: 600.01, 788.41     Gastroesophageal reflux disease without esophagitis     ICD-10-CM: K21.9  ICD-9-CM: 530.81     Healthcare maintenance     ICD-10-CM: Z00.00  ICD-9-CM: V70.0     History of nonmelanoma skin cancer     ICD-10-CM: Z85.828  ICD-9-CM: V10.83     Anxiety associated with depression     ICD-10-CM: F41.8  ICD-9-CM: 300.4     Osteopenia of multiple sites     ICD-10-CM: M85.89  ICD-9-CM: 733.90     Mechanical low back pain     ICD-10-CM: M54.5  ICD-9-CM:  724.2     RLS (restless legs syndrome)     ICD-10-CM: G25.81  ICD-9-CM: 333.94     Mixed type COPD (chronic obstructive pulmonary disease) (CMS/[facility])     ICD-10-CM: J44.9  ICD-9-CM: 496     Smoker     ICD-10-CM: F17.200  ICD-9-CM: 305.1     Encounter for immunization     ICD-10-CM: Z23  ICD-9-CM: V03.89

## 2021-09-15 VITALS
BODY MASS INDEX: 20.53 KG/M2 | OXYGEN SATURATION: 90 % | HEART RATE: 80 BPM | WEIGHT: 135 LBS | TEMPERATURE: 97.1 F | RESPIRATION RATE: 14 BRPM | DIASTOLIC BLOOD PRESSURE: 62 MMHG | SYSTOLIC BLOOD PRESSURE: 126 MMHG

## 2021-10-07 DIAGNOSIS — J44.9 MIXED TYPE COPD (CHRONIC OBSTRUCTIVE PULMONARY DISEASE) (HCC): ICD-10-CM

## 2021-10-07 DIAGNOSIS — K21.9 GASTROESOPHAGEAL REFLUX DISEASE WITHOUT ESOPHAGITIS: ICD-10-CM

## 2021-10-07 RX ORDER — MONTELUKAST SODIUM 10 MG/1
10 TABLET ORAL DAILY
Qty: 90 TABLET | Refills: 3 | Status: SHIPPED | OUTPATIENT
Start: 2021-10-07 | End: 2022-09-29 | Stop reason: SDUPTHER

## 2021-10-07 RX ORDER — LANSOPRAZOLE 30 MG/1
CAPSULE, DELAYED RELEASE ORAL
Qty: 90 CAPSULE | Refills: 3 | Status: SHIPPED | OUTPATIENT
Start: 2021-10-07 | End: 2022-06-16 | Stop reason: SDUPTHER

## 2021-11-01 DIAGNOSIS — E11.9 TYPE 2 DIABETES MELLITUS WITHOUT COMPLICATION, WITHOUT LONG-TERM CURRENT USE OF INSULIN (HCC): Primary | ICD-10-CM

## 2021-11-01 RX ORDER — GLUCOSAMINE HCL/CHONDROITIN SU 500-400 MG
CAPSULE ORAL
Qty: 90 EACH | Refills: 5 | Status: SHIPPED | OUTPATIENT
Start: 2021-11-01 | End: 2022-03-30

## 2021-11-01 RX ORDER — LANCETS 30 GAUGE
EACH MISCELLANEOUS
Qty: 90 EACH | Refills: 5 | Status: SHIPPED | OUTPATIENT
Start: 2021-11-01 | End: 2022-12-08 | Stop reason: SDUPTHER

## 2021-11-01 RX ORDER — BLOOD-GLUCOSE METER
1 KIT MISCELLANEOUS ONCE
Qty: 1 EACH | Refills: 0 | Status: SHIPPED | OUTPATIENT
Start: 2021-11-01 | End: 2021-11-01

## 2021-12-14 ENCOUNTER — OFFICE VISIT (OUTPATIENT)
Dept: FAMILY MEDICINE CLINIC | Facility: CLINIC | Age: 76
End: 2021-12-14

## 2021-12-14 DIAGNOSIS — N40.1 BENIGN PROSTATIC HYPERPLASIA WITH URINARY FREQUENCY: ICD-10-CM

## 2021-12-14 DIAGNOSIS — M54.59 MECHANICAL LOW BACK PAIN: ICD-10-CM

## 2021-12-14 DIAGNOSIS — R35.0 BENIGN PROSTATIC HYPERPLASIA WITH URINARY FREQUENCY: ICD-10-CM

## 2021-12-14 DIAGNOSIS — F17.200 SMOKER: ICD-10-CM

## 2021-12-14 DIAGNOSIS — Z00.00 HEALTHCARE MAINTENANCE: ICD-10-CM

## 2021-12-14 DIAGNOSIS — Z86.010 HISTORY OF COLONIC POLYPS: ICD-10-CM

## 2021-12-14 DIAGNOSIS — E78.2 MIXED HYPERLIPIDEMIA: ICD-10-CM

## 2021-12-14 DIAGNOSIS — I70.0 ATHEROSCLEROSIS OF AORTA (HCC): Primary | ICD-10-CM

## 2021-12-14 DIAGNOSIS — E29.1 ANDROGEN DEFICIENCY: ICD-10-CM

## 2021-12-14 DIAGNOSIS — Z85.828 HISTORY OF NONMELANOMA SKIN CANCER: ICD-10-CM

## 2021-12-14 DIAGNOSIS — I10 ESSENTIAL HYPERTENSION: ICD-10-CM

## 2021-12-14 DIAGNOSIS — F41.8 ANXIETY ASSOCIATED WITH DEPRESSION: ICD-10-CM

## 2021-12-14 DIAGNOSIS — M85.89 OSTEOPENIA OF MULTIPLE SITES: ICD-10-CM

## 2021-12-14 DIAGNOSIS — G25.81 RLS (RESTLESS LEGS SYNDROME): ICD-10-CM

## 2021-12-14 DIAGNOSIS — E11.9 TYPE 2 DIABETES MELLITUS WITHOUT COMPLICATION, WITHOUT LONG-TERM CURRENT USE OF INSULIN (HCC): ICD-10-CM

## 2021-12-14 DIAGNOSIS — J44.9 MIXED TYPE COPD (CHRONIC OBSTRUCTIVE PULMONARY DISEASE) (HCC): ICD-10-CM

## 2021-12-14 DIAGNOSIS — K21.9 GASTROESOPHAGEAL REFLUX DISEASE WITHOUT ESOPHAGITIS: ICD-10-CM

## 2021-12-14 DIAGNOSIS — I25.10 CORONARY ARTERY CALCIFICATION SEEN ON CT SCAN: ICD-10-CM

## 2021-12-14 PROCEDURE — G0439 PPPS, SUBSEQ VISIT: HCPCS | Performed by: GENERAL PRACTICE

## 2021-12-14 PROCEDURE — 1159F MED LIST DOCD IN RCRD: CPT | Performed by: GENERAL PRACTICE

## 2021-12-14 PROCEDURE — 96160 PT-FOCUSED HLTH RISK ASSMT: CPT | Performed by: GENERAL PRACTICE

## 2021-12-14 PROCEDURE — 36415 COLL VENOUS BLD VENIPUNCTURE: CPT | Performed by: GENERAL PRACTICE

## 2021-12-14 PROCEDURE — 1125F AMNT PAIN NOTED PAIN PRSNT: CPT | Performed by: GENERAL PRACTICE

## 2021-12-14 PROCEDURE — 1170F FXNL STATUS ASSESSED: CPT | Performed by: GENERAL PRACTICE

## 2021-12-14 NOTE — PROGRESS NOTES
The ABCs of the Annual Wellness Visit  Subsequent Medicare Wellness Visit    Chief Complaint  Subsequent Medicare Wellness Visit    Subjective    History of Present Illness:  Torin Granados is a 76 y.o. male who presents for a Subsequent Medicare Wellness Visit.    Diabetes  He admits to numbness and tingling of the feet.  He continues to deny any visual disturbances, polydipsia, polyuria, hypoglycemia or foot ulcerations. Evaluation to date has been: hemoglobin A1C. Home sugars: have remained at goal. Current treatments: metformin and GLP agonist -liraglutide and basal insulin (together as xultophy 10 units daily). He has not been as active with the colder weather.  He underwent an updated diabetic eye exam on 10/18/2021    Dyslipidemia  Compliance with treatment is fairly good. He remains on simvastatin and ezetimibe with no apparent side effects    Hypertension  Home blood pressure readings: not doing. Associated signs and symptoms: none.  He continues to deny any chest pain, palpitations, dyspnea, orthopnea, paroxysmal nocturnal dyspnea or peripheral edema. Current antihypertensive medications includes lisinopril.     BPH  He has a long history of nocturia, hesitancy, and decreased stream.  He feels that these symptoms have remained stable since last here.  There is no history of any daytime frequency, sense of incomplete voiding, dysuria, or hematuria.  He remains on finasteride and tamsulosin with no apparent side effects    Restless Leg Syndrome  He has a long history of intermittent lower extremity discomfort.  He describes this as a tight ache centered about his knees.  Episodes occur primarily at night.  He admits to occasional knee stiffness but denies any swelling.  He currently on ropinirole 0.5-1 nightly with a dramatic improvement in his symptoms and no apparent side effects thus far    The following portions of the patient's history were reviewed and   updated as appropriate: allergies, current  medications, past family history, past medical history, past social history, past surgical history and problem list.    Compared to one year ago, the patient feels his physical   health is the same.    Compared to one year ago, the patient feels his mental   health is the same.    Recent Hospitalizations:  He was not admitted to the hospital during the last year.     Current Medical Providers:  Patient Care Team:  Jan Dudley MD as PCP - General (Family Medicine)    Outpatient Medications Prior to Visit   Medication Sig Dispense Refill   • aspirin 81 MG EC tablet Take 1 tablet by mouth Daily. 90 tablet 3   • Blood Glucose Monitoring Suppl (Accu-Chek Ana Rosa Plus) w/Device kit USE TO TEST BLOOD GLUCOSE 1 kit 0   • DULoxetine (CYMBALTA) 60 MG capsule TAKE 2 CAPSULES BY MOUTH ONCE DAILY 180 capsule 3   • ezetimibe (ZETIA) 10 MG tablet TAKE ONE TABLET BY MOUTH EVERY DAY 90 tablet 3   • finasteride (PROSCAR) 5 MG tablet Take 1 tablet by mouth Daily. 90 tablet 3   • Glucose Blood (Blood Glucose Test) strip 1 three times daily 90 each 5   • Insulin Degludec-Liraglutide (Xultophy) 100-3.6 UNIT-MG/ML solution pen-injector subcutaneous pen Inject 10 Units under the skin into the appropriate area as directed Daily. 15 mL 3   • Insulin Pen Needle (BD Pen Needle Courtney U/F) 32G X 4 MM misc 1 each by Other route Daily. use to inject insulin once daily 90 each 3   • Lancets misc 1 three times daily 90 each 5   • lansoprazole (PREVACID) 30 MG capsule TAKE ONE CAPSULE BY MOUTH ONCE DAILY 90 capsule 3   • lisinopril (PRINIVIL,ZESTRIL) 10 MG tablet TAKE ONE TABLET BY MOUTH EVERY DAY 90 tablet 3   • metFORMIN (GLUCOPHAGE) 1000 MG tablet Take 1 tablet by mouth 2 (Two) Times a Day With Meals. 180 tablet 3   • montelukast (SINGULAIR) 10 MG tablet TAKE 1 TABLET BY MOUTH DAILY. 90 tablet 3   • rOPINIRole (REQUIP) 0.5 MG tablet Take 2 tablets by mouth Every Night. 180 tablet 3   • simvastatin (ZOCOR) 20 MG tablet Take 1 tablet by  mouth Every Night. 90 tablet 3   • tamsulosin (FLOMAX) 0.4 MG capsule 24 hr capsule TAKE 1 CAPSULE BY MOUTH EVERY NIGHT. 90 capsule 3   • Testosterone 20.25 MG/ACT (1.62%) gel APPLY 2 PUMPS ON THE SKIN ONCE DAILY 225 g 5     No facility-administered medications prior to visit.     No opioid medication identified on active medication list. I have reviewed chart for other potential  high risk medication/s and harmful drug interactions in the elderly.          Aspirin is on active medication list. Aspirin use is indicated based on review of current medical condition/s. Pros and cons of this therapy have been discussed today. Benefits of this medication outweigh potential harm.  Patient has been encouraged to continue taking this medication.  .    Patient Active Problem List   Diagnosis   • Gastroesophageal reflux disease without esophagitis   • Anxiety associated with depression   • Mechanical low back pain   • Atherosclerosis of aorta ([facility])   • Smoker   • Essential hypertension   • Mixed hyperlipidemia   • Type 2 diabetes mellitus without complication, without long-term current use of insulin ([facility])   • Androgen deficiency   • Healthcare maintenance   • History of colonic polyps   • Coronary artery calcification seen on CT scan   • Osteopenia of multiple sites   • Lumbosacral radiculopathy at S1   • Mixed type COPD (chronic obstructive pulmonary disease) ([facility])   • Benign prostatic hyperplasia with urinary frequency   • RLS (restless legs syndrome)   • History of nonmelanoma skin cancer     Advance Care Planning  Advance Directive is not on file.  ACP discussion was held with the patient during this visit. Patient does not have an advance directive, information provided.    Review of Systems   Constitutional: Negative for appetite change, chills, fatigue and fever.   HENT: Positive for congestion, rhinorrhea and sneezing. Negative for ear pain, postnasal drip, sore throat and voice change.    Eyes: Negative for visual  "disturbance.   Respiratory: Negative for cough, shortness of breath and wheezing.    Cardiovascular: Negative for chest pain, palpitations and leg swelling.   Gastrointestinal: Negative for abdominal pain, blood in stool, constipation, diarrhea, nausea and vomiting.   Genitourinary: Negative for dysuria, frequency, hematuria and urgency.        Nocturia x 2-3 with a occasional hesitancy   Musculoskeletal: Positive for arthralgias and back pain. Negative for joint swelling, myalgias and neck pain.   Skin: Negative for rash.   Neurological: Positive for numbness. Negative for weakness.   Psychiatric/Behavioral: Negative for dysphoric mood and sleep disturbance. The patient is not nervous/anxious.         Objective    Vitals:    12/14/21 1129   BP: 122/65   Pulse: 81   Resp: 14   Temp: 97.3 °F (36.3 °C)   TempSrc: Temporal   SpO2: 100%   Weight: 65.3 kg (144 lb)   Height: 172.7 cm (67.99\")     BMI Readings from Last 1 Encounters:   12/14/21 21.90 kg/m²   BMI is within normal parameters. No follow-up required.    Does the patient have evidence of cognitive impairment? No    Physical Exam  Constitutional:       General: He is not in acute distress.     Appearance: Normal appearance. He is well-developed. He is not ill-appearing or diaphoretic.      Comments: Bright and in good spirits. No apparent distress. No pallor, jaundice, diaphoresis, or cyanosis.   HENT:      Head: Atraumatic.      Right Ear: Tympanic membrane, ear canal and external ear normal.      Left Ear: Tympanic membrane, ear canal and external ear normal.   Eyes:      Conjunctiva/sclera: Conjunctivae normal.   Neck:      Thyroid: No thyroid mass or thyromegaly.      Vascular: No carotid bruit or JVD.      Trachea: Trachea normal. No tracheal deviation.   Cardiovascular:      Rate and Rhythm: Normal rate and regular rhythm.      Heart sounds: Normal heart sounds, S1 normal and S2 normal. No murmur heard.  No gallop. No S3 or S4 sounds.    Pulmonary:      " Effort: Pulmonary effort is normal.      Breath sounds: Wheezing (diffuse - mild) present.      Comments: Pulmonary hyperinflation  Chest:   Breasts:      Right: No supraclavicular adenopathy.      Left: No supraclavicular adenopathy.       Abdominal:      General: Bowel sounds are normal. There is no distension.      Palpations: Abdomen is soft. There is no hepatomegaly, splenomegaly or mass.      Tenderness: There is no abdominal tenderness.      Hernia: No hernia is present.   Musculoskeletal:      Right lower leg: No edema.      Left lower leg: No edema.      Comments: No peripheral joint redness or warmth.   Lymphadenopathy:      Head:      Right side of head: No submental, submandibular, tonsillar, preauricular, posterior auricular or occipital adenopathy.      Left side of head: No submental, submandibular, tonsillar, preauricular, posterior auricular or occipital adenopathy.      Cervical: No cervical adenopathy.      Upper Body:      Right upper body: No supraclavicular adenopathy.      Left upper body: No supraclavicular adenopathy.   Skin:     General: Skin is warm and dry.      Coloration: Skin is not cyanotic, jaundiced or pale.      Findings: No lesion or rash.      Nails: There is no clubbing.   Neurological:      Mental Status: He is alert and oriented to person, place, and time.      Cranial Nerves: No cranial nerve deficit.      Sensory: Sensory deficit (decreased vibration sense toes of both feet) present.      Motor: No tremor.      Coordination: Coordination normal.      Gait: Gait normal.   Psychiatric:         Attention and Perception: Attention normal.         Mood and Affect: Mood normal.         Speech: Speech normal.         Behavior: Behavior normal.         Thought Content: Thought content normal.       HEALTH RISK ASSESSMENT    Smoking Status:  Social History     Tobacco Use   Smoking Status Former Smoker   • Years: 40.00   • Types: Cigarettes   • Quit date: 7/1/2016   • Years since  quittin.4   Smokeless Tobacco Never Used   Tobacco Comment    estimated quit date     Alcohol Consumption:  Social History     Substance and Sexual Activity   Alcohol Use No     Fall Risk Screen:  KARENADI Fall Risk Assessment was completed, and patient is at LOW risk for falls.Assessment completed on:2021    Depression Screening:  PHQ-2/PHQ-9 Depression Screening 2021   Little interest or pleasure in doing things 0   Feeling down, depressed, or hopeless 0   Trouble falling or staying asleep, or sleeping too much 0   Feeling tired or having little energy 0   Poor appetite or overeating 0   Feeling bad about yourself - or that you are a failure or have let yourself or your family down 0   Trouble concentrating on things, such as reading the newspaper or watching television 0   Moving or speaking so slowly that other people could have noticed. Or the opposite - being so fidgety or restless that you have been moving around a lot more than usual 0   Thoughts that you would be better off dead, or of hurting yourself in some way 0   Total Score 0   If you checked off any problems, how difficult have these problems made it for you to do your work, take care of things at home, or get along with other people? Not difficult at all     Health Habits and Functional and Cognitive Screening:  Functional & Cognitive Status 2021   Do you have difficulty preparing food and eating? No   Do you have difficulty bathing yourself, getting dressed or grooming yourself? No   Do you have difficulty using the toilet? No   Do you have difficulty moving around from place to place? No   Do you have trouble with steps or getting out of a bed or a chair? No   Current Diet Well Balanced Diet   Dental Exam Up to date   Eye Exam Up to date   Exercise (times per week) 0 times per week   Current Exercises Include No Regular Exercise   Current Exercise Activities Include -   Do you need help using the phone?  No   Are you deaf or do  you have serious difficulty hearing?  No   Do you need help with transportation? No   Do you need help shopping? No   Do you need help preparing meals?  No   Do you need help with housework?  No   Do you need help with laundry? No   Do you need help taking your medications? No   Do you need help managing money? No   Do you ever drive or ride in a car without wearing a seat belt? No   Have you felt unusual stress, anger or loneliness in the last month? No   Who do you live with? Alone   If you need help, do you have trouble finding someone available to you? No   Have you been bothered in the last four weeks by sexual problems? No   Do you have difficulty concentrating, remembering or making decisions? No     Age-appropriate Screening Schedule:  Refer to the list below for future screening recommendations based on patient's age, sex and/or medical conditions. Orders for these recommended tests are listed in the plan section. The patient has been provided with a written plan.    Health Maintenance   Topic Date Due   • DIABETIC FOOT EXAM  09/01/2019   • DXA SCAN  03/02/2021   • URINE MICROALBUMIN  05/07/2022   • HEMOGLOBIN A1C  06/14/2022   • DIABETIC EYE EXAM  10/18/2022   • LIPID PANEL  12/14/2022   • TDAP/TD VACCINES (2 - Td or Tdap) 01/01/2024   • INFLUENZA VACCINE  Completed   • ZOSTER VACCINE  Completed        Assessment/Plan   CMS Preventative Services Quick Reference  Risk Factors Identified During Encounter  Cardiovascular Disease  Fall Risk-High or Moderate  Inactivity/Sedentary  Tobacco Use/Dependance (use dotphrase .tobaccocessation for documentation)  The above risks/problems have been discussed with the patient.  Follow up actions/plans if indicated are seen below in the Assessment/Plan Section.  Pertinent information has been shared with the patient in the After Visit Summary.    Diagnoses and all orders for this visit:    1. Atherosclerosis of aorta (HCC)   Reminded regarding risk factor modification  with an emphasis on tobacco cessation.  Continue low-dose ASA    2. Coronary artery calcification seen on CT scan  As above.    3. Essential hypertension   Hypertension: at goal. Evidence of target organ damage: evidence of coronary artery calcifications and aortic atherosclerosis on previous imaging.  Encouraged to continue to work on diet and exercise plan.   Continue current medication  -     CBC & Differential  -     Comprehensive Metabolic Panel    4. Mixed hyperlipidemia  As above.   Continue current medication.  -     Comprehensive Metabolic Panel  -     Lipid Panel    5. Androgen deficiency    6. Type 2 diabetes mellitus without complication, without long-term current use of insulin (HCC)  Diabetes mellitus Type II, under excellent control.   Encouraged to continue to pursue ADA diet  Encouraged aerobic exercise.  Continue current medication  Updated labs drawn.  -     Hemoglobin A1c  -     MicroAlbumin, Urine, Random - Urine, Clean Catch    7. Gastroesophageal reflux disease without esophagitis    8. History of colonic polyps  Reminded that he will be due for an updated colonoscopy in March 2022.  He will consider and this will be discussed further at his return    9. Benign prostatic hyperplasia with urinary frequency  Continue current medication  Encouraged to report if any worse or if any new symptoms or concerns.    10. Healthcare maintenance  Patient has already received a flu shot along with a third dose of the Pfizer COVID-19 vaccine this fall    11. History of nonmelanoma skin cancer    12. Anxiety associated with depression    13. Mechanical low back pain    14. Osteopenia of multiple sites  Encouraged to continue to pursue weight bearing activities while exercising joint protection.  We will discuss an updated DEXA scan at his return    15. RLS (restless legs syndrome)  Continue current medication  Encouraged to report if any worse or if any new symptoms or concerns.    16. Mixed type COPD  (chronic obstructive pulmonary disease) (HCC)   COPD is stable.  Reminded of the importance of smoking cessation  Encouraged to remain as active as symptoms allow for    17. Smoker  Reminded of the potential sequela of continued tobacco use and the options with respect to cessation.  Patient uninterested in pursuing this at present but will consider    Follow Up:   Return in about 3 months (around 3/24/2022).     An After Visit Summary and PPPS were made available to the patient.

## 2021-12-15 VITALS
SYSTOLIC BLOOD PRESSURE: 122 MMHG | BODY MASS INDEX: 21.82 KG/M2 | HEIGHT: 68 IN | TEMPERATURE: 97.3 F | HEART RATE: 81 BPM | WEIGHT: 144 LBS | OXYGEN SATURATION: 100 % | RESPIRATION RATE: 14 BRPM | DIASTOLIC BLOOD PRESSURE: 65 MMHG

## 2021-12-15 LAB
ALBUMIN SERPL-MCNC: 4.3 G/DL (ref 3.5–5.2)
ALBUMIN/GLOB SERPL: 2 G/DL
ALP SERPL-CCNC: 70 U/L (ref 39–117)
ALT SERPL-CCNC: 17 U/L (ref 1–41)
AST SERPL-CCNC: 12 U/L (ref 1–40)
BASOPHILS # BLD AUTO: 0.03 10*3/MM3 (ref 0–0.2)
BASOPHILS NFR BLD AUTO: 0.3 % (ref 0–1.5)
BILIRUB SERPL-MCNC: 0.3 MG/DL (ref 0–1.2)
BUN SERPL-MCNC: 12 MG/DL (ref 8–23)
BUN/CREAT SERPL: 16.2 (ref 7–25)
CALCIUM SERPL-MCNC: 9.3 MG/DL (ref 8.6–10.5)
CHLORIDE SERPL-SCNC: 98 MMOL/L (ref 98–107)
CHOLEST SERPL-MCNC: 138 MG/DL (ref 0–200)
CO2 SERPL-SCNC: 28.7 MMOL/L (ref 22–29)
CREAT SERPL-MCNC: 0.74 MG/DL (ref 0.76–1.27)
EOSINOPHIL # BLD AUTO: 0.03 10*3/MM3 (ref 0–0.4)
EOSINOPHIL NFR BLD AUTO: 0.3 % (ref 0.3–6.2)
ERYTHROCYTE [DISTWIDTH] IN BLOOD BY AUTOMATED COUNT: 13 % (ref 12.3–15.4)
GLOBULIN SER CALC-MCNC: 2.2 GM/DL
GLUCOSE SERPL-MCNC: 131 MG/DL (ref 65–99)
HBA1C MFR BLD: 7.6 % (ref 4.8–5.6)
HCT VFR BLD AUTO: 44.4 % (ref 37.5–51)
HDLC SERPL-MCNC: 50 MG/DL (ref 40–60)
HGB BLD-MCNC: 14.8 G/DL (ref 13–17.7)
IMM GRANULOCYTES # BLD AUTO: 0.04 10*3/MM3 (ref 0–0.05)
IMM GRANULOCYTES NFR BLD AUTO: 0.4 % (ref 0–0.5)
LDLC SERPL CALC-MCNC: 70 MG/DL (ref 0–100)
LYMPHOCYTES # BLD AUTO: 1.95 10*3/MM3 (ref 0.7–3.1)
LYMPHOCYTES NFR BLD AUTO: 21 % (ref 19.6–45.3)
MCH RBC QN AUTO: 30.2 PG (ref 26.6–33)
MCHC RBC AUTO-ENTMCNC: 33.3 G/DL (ref 31.5–35.7)
MCV RBC AUTO: 90.6 FL (ref 79–97)
MONOCYTES # BLD AUTO: 0.67 10*3/MM3 (ref 0.1–0.9)
MONOCYTES NFR BLD AUTO: 7.2 % (ref 5–12)
NEUTROPHILS # BLD AUTO: 6.55 10*3/MM3 (ref 1.7–7)
NEUTROPHILS NFR BLD AUTO: 70.8 % (ref 42.7–76)
NRBC BLD AUTO-RTO: 0 /100 WBC (ref 0–0.2)
PLATELET # BLD AUTO: 281 10*3/MM3 (ref 140–450)
POTASSIUM SERPL-SCNC: 4.6 MMOL/L (ref 3.5–5.2)
PROT SERPL-MCNC: 6.5 G/DL (ref 6–8.5)
RBC # BLD AUTO: 4.9 10*6/MM3 (ref 4.14–5.8)
SODIUM SERPL-SCNC: 134 MMOL/L (ref 136–145)
TRIGL SERPL-MCNC: 98 MG/DL (ref 0–150)
VLDLC SERPL CALC-MCNC: 18 MG/DL (ref 5–40)
WBC # BLD AUTO: 9.27 10*3/MM3 (ref 3.4–10.8)

## 2022-03-07 NOTE — PROGRESS NOTES
"Subjective   Torin Granados is a 74 y.o. male.       Chief Complaint -  Skin lesion    History of Present Illness -       Skin lesion-  He complains of skin lesion right upper lip getting larger over the past year.  He reports associated spontaneous bleeding of the skin lesion.    The following portions of the patient's history were reviewed and updated as appropriate: allergies, current medications, past family history, past medical history, past social history, past surgical history and problem list.    Review of Systems   Constitutional: Negative for activity change, appetite change, fatigue and fever.   HENT: Negative for ear pain, sinus pressure and sore throat.    Eyes: Negative for pain and visual disturbance.   Respiratory: Negative for cough and chest tightness.    Cardiovascular: Negative for chest pain and palpitations.   Gastrointestinal: Negative for abdominal pain, constipation, diarrhea, nausea and vomiting.   Endocrine: Negative for polydipsia and polyuria.   Genitourinary: Negative for dysuria and frequency.   Musculoskeletal: Negative for back pain and myalgias.   Skin: Negative for color change and rash.        Skin lesion face   Allergic/Immunologic: Negative for food allergies and immunocompromised state.   Neurological: Negative for dizziness, syncope and headaches.   Hematological: Negative for adenopathy. Does not bruise/bleed easily.   Psychiatric/Behavioral: Negative for hallucinations and suicidal ideas. The patient is not nervous/anxious.        /80   Pulse 84   Temp 97.6 °F (36.4 °C) (Oral)   Ht 172.7 cm (67.99\")   Wt 60.3 kg (133 lb)   SpO2 94%   BMI 20.23 kg/m²     Physical Exam   Constitutional: He is oriented to person, place, and time. He appears well-developed and well-nourished.   Cardiovascular: Normal rate, regular rhythm and normal heart sounds.   No murmur heard.  Pulmonary/Chest: Effort normal and breath sounds normal. No respiratory distress. He has no wheezes. "   Musculoskeletal: He exhibits no tenderness.   Neurological: He is alert and oriented to person, place, and time.   Skin: Skin is warm and dry. No rash noted.   1/4 x 1/2 cm papule right upper lip   Psychiatric: He has a normal mood and affect. His behavior is normal.   Nursing note and vitals reviewed.      Assessment/Plan     Diagnoses and all orders for this visit:    Skin lesion of face  -     Tissue Pathology Exam; Future      Procedure: 2 mm punch biopsy skin lesion of face  Provider: Cecelia Plummer PA-C  Indication: Skin lesion on right upper lip suspicious for malignancy  Description: The right upper lip was prepped and draped in sterile fashion.  1% lidocaine with epinephrine was given for local anesthesia.  A 2 mm punch biopsy was taken and sent for pathology.  Pressure was held and sterile dressing was placed.  No complications  Estimated blood loss: Minimal  Patient tolerated procedure well        This document has been electronically signed by:  Cecelia Plummer PA-C   W[facility]

## 2022-03-15 ENCOUNTER — OFFICE VISIT (OUTPATIENT)
Dept: FAMILY MEDICINE CLINIC | Facility: CLINIC | Age: 77
End: 2022-03-15

## 2022-03-15 DIAGNOSIS — E11.9 TYPE 2 DIABETES MELLITUS WITHOUT COMPLICATION, WITHOUT LONG-TERM CURRENT USE OF INSULIN: ICD-10-CM

## 2022-03-15 DIAGNOSIS — I70.0 ATHEROSCLEROSIS OF AORTA: Primary | ICD-10-CM

## 2022-03-15 DIAGNOSIS — R35.0 BENIGN PROSTATIC HYPERPLASIA WITH URINARY FREQUENCY: ICD-10-CM

## 2022-03-15 DIAGNOSIS — E78.2 MIXED HYPERLIPIDEMIA: ICD-10-CM

## 2022-03-15 DIAGNOSIS — F41.8 ANXIETY ASSOCIATED WITH DEPRESSION: ICD-10-CM

## 2022-03-15 DIAGNOSIS — F17.200 SMOKER: ICD-10-CM

## 2022-03-15 DIAGNOSIS — M54.59 MECHANICAL LOW BACK PAIN: ICD-10-CM

## 2022-03-15 DIAGNOSIS — G25.81 RLS (RESTLESS LEGS SYNDROME): ICD-10-CM

## 2022-03-15 DIAGNOSIS — N40.1 BENIGN PROSTATIC HYPERPLASIA WITH URINARY FREQUENCY: ICD-10-CM

## 2022-03-15 DIAGNOSIS — Z00.00 HEALTHCARE MAINTENANCE: ICD-10-CM

## 2022-03-15 DIAGNOSIS — I25.10 CORONARY ARTERY CALCIFICATION SEEN ON CT SCAN: ICD-10-CM

## 2022-03-15 DIAGNOSIS — I10 ESSENTIAL HYPERTENSION: ICD-10-CM

## 2022-03-15 DIAGNOSIS — J44.9 MIXED TYPE COPD (CHRONIC OBSTRUCTIVE PULMONARY DISEASE): ICD-10-CM

## 2022-03-15 DIAGNOSIS — K21.9 GASTROESOPHAGEAL REFLUX DISEASE WITHOUT ESOPHAGITIS: ICD-10-CM

## 2022-03-15 DIAGNOSIS — Z86.010 HISTORY OF COLONIC POLYPS: ICD-10-CM

## 2022-03-15 DIAGNOSIS — M85.89 OSTEOPENIA OF MULTIPLE SITES: ICD-10-CM

## 2022-03-15 PROCEDURE — 99214 OFFICE O/P EST MOD 30 MIN: CPT | Performed by: GENERAL PRACTICE

## 2022-03-15 NOTE — PROGRESS NOTES
Subjective   Torin Granados is a 76 y.o. male.     Chief Complaint  He returns for a scheduled reassessment of multiple medical problems including type 2 diabetes mellitus, hyperlipidemia, essential hypertension, and BPH    History of Present Illness     Diabetes  He admits to numbness and tingling of the feet.  He continues to deny any visual disturbances, polydipsia, polyuria, hypoglycemia or foot ulcerations. Evaluation to date has been: hemoglobin A1C. Home sugars: have remained at goal. Current treatments: metformin and GLP agonist -liraglutide and basal insulin (together as xultophy -the dose was increased to 16 units daily following his most recent labs). He underwent an updated diabetic eye exam on 10/18/2021  Lab Results   Component Value Date    HGBA1C 7.60 (H) 12/14/2021     Dyslipidemia  Compliance with treatment remains quite good. He is taking simvastatin and ezetimibe with no apparent side effects  Lab Results   Component Value Date    CHOL 143 05/07/2021    CHLPL 138 12/14/2021    TRIG 98 12/14/2021    HDL 50 12/14/2021    LDL 70 12/14/2021     Hypertension  Home blood pressure readings: not doing. Associated signs and symptoms: none.  He continues to deny any chest pain, palpitations, dyspnea, orthopnea, paroxysmal nocturnal dyspnea or peripheral edema. Current antihypertensive medications includes lisinopril.   Lab Results   Component Value Date    GLUCOSE 131 (H) 12/14/2021    BUN 12 12/14/2021    CREATININE 0.74 (L) 12/14/2021    EGFRIFNONA 103 12/14/2021    EGFRIFAFRI 125 12/14/2021    BCR 16.2 12/14/2021    K 4.6 12/14/2021    CO2 28.7 12/14/2021    CALCIUM 9.3 12/14/2021    PROTENTOTREF 6.5 12/14/2021    ALBUMIN 4.30 12/14/2021    LABIL2 2.0 12/14/2021    AST 12 12/14/2021    ALT 17 12/14/2021     Lab Results   Component Value Date    ALKPHOS 70 12/14/2021    ALKPHOS 64 05/07/2021     BPH  He has a long history of nocturia, hesitancy, and decreased stream.  He feels that these symptoms have  worsened some over the last year.  There is no history of any daytime frequency, sense of incomplete voiding, dysuria, or hematuria.  He remains on finasteride and tamsulosin with no apparent side effects    Restless Leg Syndrome  He has a long history of intermittent lower extremity discomfort.  He describes this as a tight ache centered about his knees.  Episodes occur primarily at night.  He admits to occasional knee stiffness but denies any swelling.  He currently on ropinirole 0.5-1 nightly with a dramatic improvement in his symptoms and no apparent side effects thus far    Labs  Lab Results   Component Value Date    WBC 9.27 12/14/2021    HGB 14.8 12/14/2021    HCT 44.4 12/14/2021    MCV 90.6 12/14/2021     12/14/2021     The following portions of the patient's history were reviewed and updated as appropriate: allergies, current medications, past medical history, past social history and problem list.    Review of Systems   Constitutional: Negative for appetite change, chills, fatigue, fever and unexpected weight change.   HENT: Positive for rhinorrhea and sneezing. Negative for congestion, ear pain, postnasal drip, sore throat and voice change.    Eyes: Negative for visual disturbance.   Respiratory: Negative for cough, shortness of breath and wheezing.    Cardiovascular: Negative for chest pain, palpitations and leg swelling.   Gastrointestinal: Negative for abdominal pain, blood in stool, constipation, diarrhea, nausea and vomiting.   Endocrine: Negative for polydipsia and polyuria.   Genitourinary: Negative for dysuria, hematuria and urgency.        Nocturia x 3-4 with occasional hesitancy   Musculoskeletal: Positive for arthralgias and back pain. Negative for myalgias.   Skin: Negative for rash.   Neurological: Positive for numbness. Negative for tremors, weakness and headaches.   Psychiatric/Behavioral: Negative for dysphoric mood and sleep disturbance. The patient is not nervous/anxious.       Objective   Physical Exam  Constitutional:       General: He is not in acute distress.     Appearance: Normal appearance. He is well-developed. He is not ill-appearing or diaphoretic.      Comments: Bright and in good spirits. No apparent distress. No pallor, jaundice, diaphoresis, or cyanosis.   HENT:      Head: Atraumatic.      Right Ear: Tympanic membrane, ear canal and external ear normal.      Left Ear: Tympanic membrane, ear canal and external ear normal.   Eyes:      Conjunctiva/sclera: Conjunctivae normal.   Neck:      Thyroid: No thyroid mass or thyromegaly.      Vascular: No carotid bruit or JVD.      Trachea: Trachea normal. No tracheal deviation.   Cardiovascular:      Rate and Rhythm: Normal rate and regular rhythm.      Heart sounds: Normal heart sounds, S1 normal and S2 normal. No murmur heard.    No gallop. No S3 or S4 sounds.   Pulmonary:      Effort: Pulmonary effort is normal.      Breath sounds: Wheezing (diffuse - mild) present.      Comments: Pulmonary hyperinflation  Chest:   Breasts:      Right: No supraclavicular adenopathy.      Left: No supraclavicular adenopathy.       Abdominal:      General: Bowel sounds are normal. There is no distension.      Hernia: No hernia is present.   Musculoskeletal:      Right lower leg: No edema.      Left lower leg: No edema.      Comments: No peripheral joint redness or warmth.   Lymphadenopathy:      Head:      Right side of head: No submental, submandibular, tonsillar, preauricular, posterior auricular or occipital adenopathy.      Left side of head: No submental, submandibular, tonsillar, preauricular, posterior auricular or occipital adenopathy.      Cervical: No cervical adenopathy.      Upper Body:      Right upper body: No supraclavicular adenopathy.      Left upper body: No supraclavicular adenopathy.   Skin:     General: Skin is warm and dry.      Coloration: Skin is not cyanotic, jaundiced or pale.      Findings: No lesion or rash.      Nails:  There is no clubbing.   Neurological:      Mental Status: He is alert and oriented to person, place, and time.      Cranial Nerves: No cranial nerve deficit.      Sensory: Sensory deficit (decreased vibration sense toes of both feet) present.      Motor: No tremor.      Coordination: Coordination normal.      Gait: Gait normal.   Psychiatric:         Attention and Perception: Attention normal.         Mood and Affect: Mood normal.         Speech: Speech normal.         Behavior: Behavior normal.         Thought Content: Thought content normal.       Assessment/Plan   Problems Addressed this Visit        Cardiac and Vasculature    Atherosclerosis of aorta ([facility])   Reminded regarding risk factor modification with an emphasis on tobacco cessation.  Continue low-dose ASA    Coronary artery calcification seen on CT scan  As above.   Continue current medication.    Essential hypertension   Hypertension: at goal. Evidence of target organ damage: atherosclerosis of the aorta and coronary artery calcifications on previous imaging.  Encouraged to continue to work on diet and exercise plan.   Continue current medication    Mixed hyperlipidemia  As above.   Continue current medication.       Endocrine and Metabolic    Type 2 diabetes mellitus without complication, without long-term current use of insulin ([facility])  As above.   Continue current medication.  Updated labs will be drawn at his return.    Relevant Medications    Insulin Degludec-Liraglutide (Xultophy) 100-3.6 UNIT-MG/ML solution pen-injector subcutaneous pen       Gastrointestinal Abdominal     Gastroesophageal reflux disease without esophagitis    History of colonic polyps  Reminded that he is due for an updated colonoscopy.  He would like to proceed and arrangements will be made    Relevant Orders    Ambulatory Referral to Gastroenterology       Genitourinary and Reproductive     Benign prostatic hyperplasia with urinary frequency  Reviewed surgical options.  He would  like to meet with a urologist and arrangements will be made    Relevant Orders    Ambulatory Referral to Urology       Health Encounters    Healthcare maintenance  We will arrange an updated low-dose CT of the chest and DEXA scan at his return    Relevant Orders    Ambulatory Referral to Gastroenterology       Mental Health    Anxiety associated with depression       Musculoskeletal and Injuries    Mechanical low back pain    Osteopenia of multiple sites       Neuro    RLS (restless legs syndrome)  Reminded regarding symptomatic treatment.   Continue current medication       Pulmonary and Pneumonias    Mixed type COPD (chronic obstructive pulmonary disease) (HCC)   COPD is stable.  Reminded of the importance of smoking cessation  Encouraged to remain as active as symptoms allow for       Tobacco    Smoker  Lengthy discussion regarding the potential sequela of continued tobacco use and the options with respect to cessation.  Patient uninterested in pursuing at present but will consider.      Diagnoses       Codes Comments    Atherosclerosis of aorta (HCC)    -  Primary ICD-10-CM: I70.0  ICD-9-CM: 440.0     Coronary artery calcification seen on CT scan     ICD-10-CM: I25.10  ICD-9-CM: 414.00     Essential hypertension     ICD-10-CM: I10  ICD-9-CM: 401.9     Mixed hyperlipidemia     ICD-10-CM: E78.2  ICD-9-CM: 272.2     Type 2 diabetes mellitus without complication, without long-term current use of insulin (HCC)     ICD-10-CM: E11.9  ICD-9-CM: 250.00     Gastroesophageal reflux disease without esophagitis     ICD-10-CM: K21.9  ICD-9-CM: 530.81     History of colonic polyps     ICD-10-CM: Z86.010  ICD-9-CM: V12.72     Benign prostatic hyperplasia with urinary frequency     ICD-10-CM: N40.1, R35.0  ICD-9-CM: 600.01, 788.41     Healthcare maintenance     ICD-10-CM: Z00.00  ICD-9-CM: V70.0     Anxiety associated with depression     ICD-10-CM: F41.8  ICD-9-CM: 300.4     Mechanical low back pain     ICD-10-CM:  M54.59  ICD-9-CM: 724.2     Osteopenia of multiple sites     ICD-10-CM: M85.89  ICD-9-CM: 733.90     RLS (restless legs syndrome)     ICD-10-CM: G25.81  ICD-9-CM: 333.94     Mixed type COPD (chronic obstructive pulmonary disease) (HCC)     ICD-10-CM: J44.9  ICD-9-CM: 496     Smoker     ICD-10-CM: F17.200  ICD-9-CM: 305.1

## 2022-03-16 VITALS
OXYGEN SATURATION: 97 % | BODY MASS INDEX: 20.92 KG/M2 | TEMPERATURE: 98.2 F | DIASTOLIC BLOOD PRESSURE: 70 MMHG | RESPIRATION RATE: 14 BRPM | SYSTOLIC BLOOD PRESSURE: 124 MMHG | HEIGHT: 68 IN | WEIGHT: 138 LBS | HEART RATE: 86 BPM

## 2022-03-16 RX ORDER — (INSULIN DEGLUDEC AND LIRAGLUTIDE) 100; 3.6 [IU]/ML; MG/ML
16 INJECTION, SOLUTION SUBCUTANEOUS DAILY
Qty: 15 ML | Refills: 3 | Status: SHIPPED | OUTPATIENT
Start: 2022-03-16 | End: 2023-01-16 | Stop reason: SDUPTHER

## 2022-03-21 DIAGNOSIS — N40.1 BENIGN PROSTATIC HYPERPLASIA WITH URINARY FREQUENCY: ICD-10-CM

## 2022-03-21 DIAGNOSIS — R35.0 BENIGN PROSTATIC HYPERPLASIA WITH URINARY FREQUENCY: ICD-10-CM

## 2022-03-21 RX ORDER — FINASTERIDE 5 MG/1
5 TABLET, FILM COATED ORAL DAILY
Qty: 50 TABLET | Refills: 3 | Status: SHIPPED | OUTPATIENT
Start: 2022-03-21 | End: 2022-06-16

## 2022-03-23 DIAGNOSIS — E11.9 TYPE 2 DIABETES MELLITUS WITHOUT COMPLICATION, WITHOUT LONG-TERM CURRENT USE OF INSULIN: ICD-10-CM

## 2022-03-23 DIAGNOSIS — M79.604 PAIN IN BOTH LOWER EXTREMITIES: ICD-10-CM

## 2022-03-23 DIAGNOSIS — M79.605 PAIN IN BOTH LOWER EXTREMITIES: ICD-10-CM

## 2022-03-23 RX ORDER — ROPINIROLE 0.5 MG/1
1 TABLET, FILM COATED ORAL NIGHTLY
Qty: 180 TABLET | Refills: 3 | Status: SHIPPED | OUTPATIENT
Start: 2022-03-23 | End: 2022-11-18 | Stop reason: SDUPTHER

## 2022-03-23 RX ORDER — PEN NEEDLE, DIABETIC 32GX 5/32"
NEEDLE, DISPOSABLE MISCELLANEOUS
Qty: 90 EACH | Refills: 3 | Status: SHIPPED | OUTPATIENT
Start: 2022-03-23 | End: 2022-12-08 | Stop reason: SDUPTHER

## 2022-03-29 DIAGNOSIS — E11.9 TYPE 2 DIABETES MELLITUS WITHOUT COMPLICATION, WITHOUT LONG-TERM CURRENT USE OF INSULIN: ICD-10-CM

## 2022-03-30 RX ORDER — BLOOD SUGAR DIAGNOSTIC
STRIP MISCELLANEOUS
Qty: 200 EACH | Refills: 5 | Status: SHIPPED | OUTPATIENT
Start: 2022-03-30 | End: 2022-12-08 | Stop reason: SDUPTHER

## 2022-04-13 ENCOUNTER — OFFICE VISIT (OUTPATIENT)
Dept: UROLOGY | Facility: CLINIC | Age: 77
End: 2022-04-13

## 2022-04-13 VITALS — HEIGHT: 68 IN | BODY MASS INDEX: 20.92 KG/M2 | WEIGHT: 138 LBS

## 2022-04-13 DIAGNOSIS — R33.9 INCOMPLETE BLADDER EMPTYING: ICD-10-CM

## 2022-04-13 DIAGNOSIS — R35.0 FREQUENCY OF MICTURITION: Primary | ICD-10-CM

## 2022-04-13 LAB
BILIRUB BLD-MCNC: NEGATIVE MG/DL
CLARITY, POC: CLEAR
COLOR UR: YELLOW
EXPIRATION DATE: ABNORMAL
GLUCOSE UR STRIP-MCNC: NEGATIVE MG/DL
KETONES UR QL: NEGATIVE
LEUKOCYTE EST, POC: NEGATIVE
Lab: ABNORMAL
NITRITE UR-MCNC: NEGATIVE MG/ML
PH UR: 6.5 [PH] (ref 5–8)
PROT UR STRIP-MCNC: NEGATIVE MG/DL
RBC # UR STRIP: NEGATIVE /UL
SP GR UR: 1 (ref 1–1.03)
UROBILINOGEN UR QL: NORMAL

## 2022-04-13 PROCEDURE — 81003 URINALYSIS AUTO W/O SCOPE: CPT | Performed by: UROLOGY

## 2022-04-13 PROCEDURE — 51798 US URINE CAPACITY MEASURE: CPT | Performed by: UROLOGY

## 2022-04-13 PROCEDURE — 99204 OFFICE O/P NEW MOD 45 MIN: CPT | Performed by: UROLOGY

## 2022-04-13 PROCEDURE — 36415 COLL VENOUS BLD VENIPUNCTURE: CPT | Performed by: UROLOGY

## 2022-04-13 PROCEDURE — 84153 ASSAY OF PSA TOTAL: CPT | Performed by: UROLOGY

## 2022-04-13 NOTE — PROGRESS NOTES
Office Visit New Urology      Patient Name: Torin Granados  : 1945   MRN: [id number]     Chief Complaint:    Chief Complaint   Patient presents with   • Benign Prostatic Hypertrophy     New pt       Referring Provider: Jan Dudley*    History of Present Illness: Torin Granados is a 76 y.o. male who presents to Urology today for lower urinary tract symptoms.  Over the past year he reports he has had worsening LUTS.  He reports he has nocturia up to 5 times/night.  He reports that he has the feeling of incomplete bladder emptying.  He has dribbling and intermittency.  He states it will stop and he has to relax himself and put pressure on his bladder for his urine to start again.  No dysuria or gross hematuria.  He also complains of frequency and urgency.  He reports he has had PSA drawn in the past but is not sure about the results.  We do not have any results here today.     He is currently on tamsulosin and finasteride for his prostate.  He states that he is unsure if it has helped.      His most bothersome symptom is nocturia.  He does state he thinks he sores.  He does report daytime sleepiness.     Subjective      Review of System: Review of Systems   Constitutional: Negative for chills and fever.   HENT: Negative for sinus pain.    Respiratory: Negative for cough.    Cardiovascular: Negative for leg swelling.   Gastrointestinal: Negative for abdominal pain.   Genitourinary: Positive for frequency and urgency. Negative for dysuria.   Musculoskeletal: Negative for back pain.   Neurological: Negative for headaches.   Psychiatric/Behavioral: The patient is not nervous/anxious.       I have reviewed the ROS documented by my clinical staff, I have updated appropriately and I agree. Greg Barraza MD    Past Medical History:   Past Medical History:   Diagnosis Date   • Anxiety associated with depression    • Arthritis    • Basal cell carcinoma (BCC) of skin of face    • Benign prostatic  hyperplasia    • Coronary artery calcification seen on CT scan 2017   • Depression    • Diabetes mellitus (HCC)    • Disease of thyroid gland    • Hypertension    • Mixed type COPD (chronic obstructive pulmonary disease) (HCC) 2019       Past Surgical History:   Past Surgical History:   Procedure Laterality Date   • COLONOSCOPY      over 5years ago   • COLONOSCOPY N/A 2017    Procedure: Colonoscopy  CPTCODE: 15883;  Surgeon: Rudy Velez III, MD;  Location: [facility];  Service:    • FRACTURE SURGERY     • KNEE SURGERY     • VASECTOMY  1970       Family History:   Family History   Problem Relation Age of Onset   • Diabetes Mother    • Heart disease Mother    • Stroke Mother    • Hypertension Mother    • Diabetes Father    • Heart disease Father        Social History:   Social History     Socioeconomic History   • Marital status:      Spouse name: emmie   • Number of children: 1   • Years of education: 20   Tobacco Use   • Smoking status: Current Some Day Smoker     Packs/day: 1.00     Years: 20.00     Pack years: 20.00     Types: Cigarettes     Last attempt to quit: 2016     Years since quittin.7   • Smokeless tobacco: Never Used   • Tobacco comment: estimated quit date   Substance and Sexual Activity   • Alcohol use: No   • Drug use: Not Currently   • Sexual activity: Not Currently     Partners: Female       Medications:     Current Outpatient Medications:   •  aspirin 81 MG EC tablet, Take 1 tablet by mouth Daily., Disp: 90 tablet, Rfl: 3  •  BD Pen Needle Courtney U/F 32G X 4 MM misc, USE TO INJECT INSULIN ONCE DAILY, Disp: 90 each, Rfl: 3  •  Blood Glucose Monitoring Suppl (Accu-Chek Ana Rosa Plus) w/Device kit, USE TO TEST BLOOD GLUCOSE, Disp: 1 kit, Rfl: 0  •  DULoxetine (CYMBALTA) 60 MG capsule, TAKE 2 CAPSULES BY MOUTH ONCE DAILY, Disp: 180 capsule, Rfl: 3  •  ezetimibe (ZETIA) 10 MG tablet, TAKE ONE TABLET BY MOUTH EVERY DAY, Disp: 90 tablet, Rfl: 3  •  finasteride (PROSCAR)  5 MG tablet, TAKE 1 TABLET BY MOUTH DAILY., Disp: 50 tablet, Rfl: 3  •  glucose blood (Accu-Chek Ana Rosa Plus) test strip, USE TO TEST BLOOD GLUCOSE THREE TIMES A DAY, Disp: 200 each, Rfl: 5  •  Insulin Degludec-Liraglutide (Xultophy) 100-3.6 UNIT-MG/ML solution pen-injector subcutaneous pen, Inject 16 Units under the skin into the appropriate area as directed Daily., Disp: 15 mL, Rfl: 3  •  Lancets misc, 1 three times daily, Disp: 90 each, Rfl: 5  •  lansoprazole (PREVACID) 30 MG capsule, TAKE ONE CAPSULE BY MOUTH ONCE DAILY, Disp: 90 capsule, Rfl: 3  •  lisinopril (PRINIVIL,ZESTRIL) 10 MG tablet, TAKE ONE TABLET BY MOUTH EVERY DAY, Disp: 90 tablet, Rfl: 3  •  metFORMIN (GLUCOPHAGE) 1000 MG tablet, Take 1 tablet by mouth 2 (Two) Times a Day With Meals., Disp: 180 tablet, Rfl: 3  •  montelukast (SINGULAIR) 10 MG tablet, TAKE 1 TABLET BY MOUTH DAILY., Disp: 90 tablet, Rfl: 3  •  rOPINIRole (REQUIP) 0.5 MG tablet, TAKE 2 TABLETS BY MOUTH EVERY NIGHT., Disp: 180 tablet, Rfl: 3  •  simvastatin (ZOCOR) 20 MG tablet, Take 1 tablet by mouth Every Night., Disp: 90 tablet, Rfl: 3  •  tamsulosin (FLOMAX) 0.4 MG capsule 24 hr capsule, TAKE 1 CAPSULE BY MOUTH EVERY NIGHT., Disp: 90 capsule, Rfl: 3  •  Testosterone 20.25 MG/ACT (1.62%) gel, APPLY 2 PUMPS ON THE SKIN ONCE DAILY, Disp: 225 g, Rfl: 5    Allergies:   No Known Allergies    IPSS Questionnaire (AUA-7):  Over the past month…    1)  Incomplete Emptying  How often have you had a sensation of not emptying your bladder?  4 - More than half the time   2)  Frequency  How often have you had to urinate less than every two hours? 4 - More than half the time   3)  Intermittency  How often have you found you stopped and started again several times when you urinated?  2 - Less than half the time   4) Urgency  How often have you found it difficult to postpone urination?  3 - About half the time   5) Weak Stream  How often have you had a weak urinary stream?  2 - Less than half the  "time   6) Straining  How often have you had to push or strain to begin urination?  2 - Less than half the time   7) Nocturia  How many times did you typically get up at night to urinate?  5 - 5+ times   Total Score:  22   The International Prostate Symptom Score (IPSS) is used to screen, diagnose, track symptoms of benign prostatic hyperplasia (BPH).    0-7 pts (Mild Symptoms)  / 8-19 pts (Moderate) / 20-35 (Severe)    Quality of life due to urinary symptoms:  If you were to spend the rest of your life with your urinary condition the way it is now, how would you feel about that? 4-Mostly Dissatisfied   Urine Leakage (Incontinence) 1-Mild (A few drops a day, no pad use)     Post void residual bladder scan:   102 mL     Objective     Physical Exam:   Vital Signs:   Vitals:    04/13/22 1343   Weight: 62.6 kg (138 lb)   Height: 172.7 cm (67.99\")     Body mass index is 20.99 kg/m².     Physical Exam  Vitals and nursing note reviewed.   Constitutional:       General: He is awake. He is not in acute distress.     Appearance: Normal appearance. He is well-developed.   HENT:      Head: Normocephalic and atraumatic.      Right Ear: External ear normal.      Left Ear: External ear normal.      Nose: Nose normal.   Eyes:      Conjunctiva/sclera: Conjunctivae normal.   Pulmonary:      Effort: Pulmonary effort is normal.   Abdominal:      General: There is no distension.      Palpations: Abdomen is soft. There is no mass.      Tenderness: There is no abdominal tenderness. There is no right CVA tenderness, left CVA tenderness, guarding or rebound.      Hernia: No hernia is present. There is no hernia in the left inguinal area or right inguinal area.   Genitourinary:     Pubic Area: No rash.       Penis: Normal.       Testes: Normal.      Prostate: Normal.      Rectum: Normal. No mass or tenderness. Normal anal tone.      Comments: Approx 35g prostate. No nodules  Musculoskeletal:      Cervical back: Normal range of motion. "   Lymphadenopathy:      Lower Body: No right inguinal adenopathy. No left inguinal adenopathy.   Skin:     General: Skin is warm.   Neurological:      General: No focal deficit present.      Mental Status: He is alert and oriented to person, place, and time.   Psychiatric:         Behavior: Behavior normal. Behavior is cooperative.         Labs:   Brief Urine Lab Results  (Last result in the past 365 days)      Color   Clarity   Blood   Leuk Est   Nitrite   Protein   CREAT   Urine HCG        04/13/22 1416 Yellow   Clear   Negative   Negative   Negative   Negative                      Lab Results   Component Value Date    GLUCOSE 131 (H) 12/14/2021    CALCIUM 9.3 12/14/2021     (L) 12/14/2021    K 4.6 12/14/2021    CO2 28.7 12/14/2021    CL 98 12/14/2021    BUN 12 12/14/2021    CREATININE 0.74 (L) 12/14/2021    EGFRIFAFRI 125 12/14/2021    EGFRIFNONA 103 12/14/2021    BCR 16.2 12/14/2021    ANIONGAP 13.8 05/07/2021       Lab Results   Component Value Date    WBC 9.27 12/14/2021    HGB 14.8 12/14/2021    HCT 44.4 12/14/2021    MCV 90.6 12/14/2021     12/14/2021       No results found for: PSA     Images:   No Images in the past 120 days found..    Measures:   Tobacco:   Torin Granados  reports that he has been smoking cigarettes. He has a 20.00 pack-year smoking history. He has never used smokeless tobacco.. I have educated him on the risk of diseases from using tobacco products such as cancer, COPD and heart disease.     He is thinking about quitting.     I spent 3  minutes counseling the patient.    Urine Incontinence: Patient reports that he is not currently experiencing any symptoms of urinary incontinence.       Assessment / Plan      Assessment/Plan:   Torin Granados is a 76 y.o. male who presented today for LUTS.  He is currently on flomax and finasteride.  I discussed his options and a possible bladder outlet procedure.  He is interested in a possible outlet procedure.  I will have him follow up  in the next 2-3 weeks for cystoscopy.   We will obtain a PSA today as well.       Diagnoses and all orders for this visit:    1. Frequency of micturition (Primary)  -     POC Urinalysis Dipstick, Automated    2. Incomplete bladder emptying  -     Bladder Scan  -     PSA DIAGNOSTIC         Follow Up:   Return in about 2 weeks (around 4/27/2022) for Procedure next visit.    I spent approximately 45 minutes providing clinical care for this patient; including review of patient's chart and provider documentation, face to face time spent with patient in examination room (obtaining history, performing physical exam, discussing diagnosis and management options), placing orders, and completing patient documentation.     Greg Barraza MD  [facility]

## 2022-04-15 LAB — PSA SERPL-MCNC: 0.1 NG/ML (ref 0–4)

## 2022-05-04 ENCOUNTER — PROCEDURE VISIT (OUTPATIENT)
Dept: UROLOGY | Facility: CLINIC | Age: 77
End: 2022-05-04

## 2022-05-04 VITALS — BODY MASS INDEX: 20.92 KG/M2 | HEIGHT: 68 IN | WEIGHT: 138 LBS

## 2022-05-04 DIAGNOSIS — R33.9 INCOMPLETE BLADDER EMPTYING: ICD-10-CM

## 2022-05-04 DIAGNOSIS — R35.0 FREQUENCY OF MICTURITION: Primary | ICD-10-CM

## 2022-05-04 LAB
BILIRUB BLD-MCNC: NEGATIVE MG/DL
CLARITY, POC: CLEAR
COLOR UR: YELLOW
EXPIRATION DATE: NORMAL
GLUCOSE UR STRIP-MCNC: NEGATIVE MG/DL
KETONES UR QL: NEGATIVE
LEUKOCYTE EST, POC: NEGATIVE
Lab: NORMAL
NITRITE UR-MCNC: NEGATIVE MG/ML
PH UR: 6 [PH] (ref 5–8)
PROT UR STRIP-MCNC: NEGATIVE MG/DL
RBC # UR STRIP: NEGATIVE /UL
SP GR UR: 1.01 (ref 1–1.03)
UROBILINOGEN UR QL: NORMAL

## 2022-05-04 PROCEDURE — 52000 CYSTOURETHROSCOPY: CPT | Performed by: UROLOGY

## 2022-05-04 PROCEDURE — 51798 US URINE CAPACITY MEASURE: CPT | Performed by: UROLOGY

## 2022-05-04 PROCEDURE — 81003 URINALYSIS AUTO W/O SCOPE: CPT | Performed by: UROLOGY

## 2022-05-04 NOTE — PROGRESS NOTES
HPI:       HPI   Mr. Granados is a 76-year-old gentleman who is here today for cystoscopy for further work-up of his bladder outlet obstruction.  He is currently on tamsulosin and finasteride.  He reports overall he is doing well but is bothered by his lower urinary tract symptoms.    PVR: 61 mL      Procedure:      Procedure: Cystoscopy Male    Indication: CASH    Urinalysis Performed Today:  Negative for Infection    Informed Consent Obtained    Sterile prep performed in usual fashion    11 cc of topical lidocaine inserted urethrally for 10 minutes    Flexible cystoscope inserted and bladder examined    Findings: Severe trabeculations and cellules.  No foreign bodies were lesions noted within the walls of the bladder.  Clear reflux of urine bilaterally.  He had trilobar hypertrophy with a very high bladder neck.  He would not be a good candidate for UroLift.    Additional Procedure with Cystoscopy: none      Discussion:      Mr. Granados is a 76-year-old gentleman who is here today for cystoscopy.  He underwent his procedure without any immediate complication.  Based on his cystoscopy he would likely benefit from greenlight laser vaporization or transurethral resection of the prostate.  I discussed his findings with him and I do not think that he would be a great candidate for UroLift.  He understood this and stated he would prefer a procedure that had a higher chance of success.  I will have him follow-up in 2 weeks for transrectal ultrasound for volume.  Based on this we will discuss his options.  In the meantime he will continue his medication.    Assessment:     Encounter Diagnoses   Name Primary?   • Frequency of micturition Yes   • Incomplete bladder emptying      Orders Placed This Encounter   Procedures   • Bladder Scan   • POC Urinalysis Dipstick, Automated     Order Specific Question:   Release to patient     Answer:   Immediate

## 2022-05-12 ENCOUNTER — OFFICE VISIT (OUTPATIENT)
Dept: GASTROENTEROLOGY | Facility: CLINIC | Age: 77
End: 2022-05-12

## 2022-05-12 VITALS
HEIGHT: 68 IN | SYSTOLIC BLOOD PRESSURE: 142 MMHG | BODY MASS INDEX: 20.76 KG/M2 | WEIGHT: 137 LBS | DIASTOLIC BLOOD PRESSURE: 78 MMHG

## 2022-05-12 DIAGNOSIS — K21.9 GASTROESOPHAGEAL REFLUX DISEASE WITHOUT ESOPHAGITIS: ICD-10-CM

## 2022-05-12 DIAGNOSIS — K80.20 GALLSTONES: ICD-10-CM

## 2022-05-12 DIAGNOSIS — Z86.010 PERSONAL HISTORY OF COLONIC POLYPS: Primary | ICD-10-CM

## 2022-05-12 DIAGNOSIS — R93.5 ABNORMAL CT OF THE ABDOMEN: ICD-10-CM

## 2022-05-12 PROCEDURE — 99204 OFFICE O/P NEW MOD 45 MIN: CPT | Performed by: INTERNAL MEDICINE

## 2022-05-12 RX ORDER — SODIUM, POTASSIUM,MAG SULFATES 17.5-3.13G
2 SOLUTION, RECONSTITUTED, ORAL ORAL ONCE
Qty: 354 ML | Refills: 0 | Status: SHIPPED | OUTPATIENT
Start: 2022-05-12 | End: 2022-05-12

## 2022-05-12 RX ORDER — SODIUM CHLORIDE 9 MG/ML
70 INJECTION, SOLUTION INTRAVENOUS CONTINUOUS PRN
Status: CANCELLED | OUTPATIENT
Start: 2022-05-12
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
## 2022-05-18 ENCOUNTER — OFFICE VISIT (OUTPATIENT)
Dept: UROLOGY | Facility: CLINIC | Age: 77
End: 2022-05-18

## 2022-05-18 VITALS — WEIGHT: 138 LBS | BODY MASS INDEX: 20.92 KG/M2 | HEIGHT: 68 IN

## 2022-05-18 DIAGNOSIS — R33.9 URINARY RETENTION: Primary | ICD-10-CM

## 2022-05-18 DIAGNOSIS — R31.9 HEMATURIA, UNSPECIFIED TYPE: ICD-10-CM

## 2022-05-18 DIAGNOSIS — R39.14 BENIGN PROSTATIC HYPERPLASIA WITH INCOMPLETE BLADDER EMPTYING: ICD-10-CM

## 2022-05-18 DIAGNOSIS — N40.1 BENIGN PROSTATIC HYPERPLASIA WITH INCOMPLETE BLADDER EMPTYING: ICD-10-CM

## 2022-05-18 PROBLEM — R39.9 LOWER URINARY TRACT SYMPTOMS (LUTS): Status: ACTIVE | Noted: 2022-05-18

## 2022-05-18 PROCEDURE — 99215 OFFICE O/P EST HI 40 MIN: CPT | Performed by: UROLOGY

## 2022-05-18 PROCEDURE — 76872 US TRANSRECTAL: CPT | Performed by: UROLOGY

## 2022-05-18 RX ORDER — CEFAZOLIN SODIUM 2 G/50ML
2 SOLUTION INTRAVENOUS ONCE
Status: CANCELLED | OUTPATIENT
Start: 2022-06-02 | End: 2022-05-18

## 2022-05-18 NOTE — PROGRESS NOTES
Follow Up Office Visit      Patient Name: Torin Granados  : 1945   MRN: [id number]     Chief Complaint:    Chief Complaint   Patient presents with   • Urinary Frequency     Prostate US        Referring Provider: No ref. provider found    History of Present Illness: Torin Granados is a 76 y.o. male who presents today for follow up of BPH and LUTS.  He is here today for TRUS for volume.    TRUS Volume obtained.    MÓNICA: normal     Total Volume: 26.03 cc  Height 2.41 cm  Width 3.70 cm Length 4.46 cm          Subjective      Review of System: Review of Systems   Constitutional: Negative for chills, fatigue and fever.   HENT: Negative for congestion and sinus pressure.    Respiratory: Negative for chest tightness and shortness of breath.    Cardiovascular: Negative for chest pain.   Gastrointestinal: Negative for abdominal pain, constipation, diarrhea, nausea and vomiting.   Genitourinary: Positive for frequency. Negative for flank pain, hematuria and urgency.   Musculoskeletal: Negative for back pain and neck pain.   Skin: Negative for rash.   Neurological: Negative for dizziness and headaches.   Hematological: Does not bruise/bleed easily.   Psychiatric/Behavioral: The patient is not nervous/anxious.       I have reviewed the ROS documented by my clinical staff, I have updated appropriately and I agree. Greg Barraza MD    I have reviewed and the following portions of the patient's history were updated as appropriate: past family history, past medical history, past social history, past surgical history and problem list.    Past Medical History:   Past Medical History:   Diagnosis Date   • Alcoholism (HCC)    • Anxiety associated with depression    • Arthritis    • Basal cell carcinoma (BCC) of skin of face    • Benign prostatic hyperplasia    • Colon polyp    • Coronary artery calcification seen on CT scan 2017   • Depression    • Diabetes mellitus (HCC)    • Disease of thyroid gland    •  Hyperlipidemia    • Hypertension    • Mixed type COPD (chronic obstructive pulmonary disease) (HCC) 09/17/2019   • Ulcer        Past Surgical History:  Past Surgical History:   Procedure Laterality Date   • COLONOSCOPY      over 5years ago   • COLONOSCOPY N/A 03/29/2017    Procedure: Colonoscopy  CPTCODE: 04137;  Surgeon: Rudy Velez III, MD;  Location: [facility];  Service:    • FRACTURE SURGERY     • KNEE SURGERY     • VASECTOMY  1970       Family History:   family history includes Diabetes in his father and mother; Heart disease in his father and mother; Hypertension in his mother; Stroke in his mother.   Otherwise pertinent FHx was reviewed and not pertinent to current issue.    Social History:    reports that he has been smoking cigarettes. He has a 20.00 pack-year smoking history. He has never used smokeless tobacco. He reports previous drug use. He reports that he does not drink alcohol.    Medications:     Current Outpatient Medications:   •  aspirin 81 MG EC tablet, Take 1 tablet by mouth Daily., Disp: 90 tablet, Rfl: 3  •  BD Pen Needle Courtney U/F 32G X 4 MM misc, USE TO INJECT INSULIN ONCE DAILY, Disp: 90 each, Rfl: 3  •  Blood Glucose Monitoring Suppl (Accu-Chek Ana Rosa Plus) w/Device kit, USE TO TEST BLOOD GLUCOSE, Disp: 1 kit, Rfl: 0  •  DULoxetine (CYMBALTA) 60 MG capsule, TAKE 2 CAPSULES BY MOUTH ONCE DAILY, Disp: 180 capsule, Rfl: 3  •  ezetimibe (ZETIA) 10 MG tablet, TAKE ONE TABLET BY MOUTH EVERY DAY, Disp: 90 tablet, Rfl: 3  •  finasteride (PROSCAR) 5 MG tablet, TAKE 1 TABLET BY MOUTH DAILY., Disp: 50 tablet, Rfl: 3  •  glucose blood (Accu-Chek Ana Rosa Plus) test strip, USE TO TEST BLOOD GLUCOSE THREE TIMES A DAY, Disp: 200 each, Rfl: 5  •  Insulin Degludec-Liraglutide (Xultophy) 100-3.6 UNIT-MG/ML solution pen-injector subcutaneous pen, Inject 16 Units under the skin into the appropriate area as directed Daily., Disp: 15 mL, Rfl: 3  •  Lancets misc, 1 three times daily, Disp: 90 each, Rfl: 5  •  " lansoprazole (PREVACID) 30 MG capsule, TAKE ONE CAPSULE BY MOUTH ONCE DAILY, Disp: 90 capsule, Rfl: 3  •  lisinopril (PRINIVIL,ZESTRIL) 10 MG tablet, TAKE ONE TABLET BY MOUTH EVERY DAY, Disp: 90 tablet, Rfl: 3  •  metFORMIN (GLUCOPHAGE) 1000 MG tablet, Take 1 tablet by mouth 2 (Two) Times a Day With Meals., Disp: 180 tablet, Rfl: 3  •  montelukast (SINGULAIR) 10 MG tablet, TAKE 1 TABLET BY MOUTH DAILY., Disp: 90 tablet, Rfl: 3  •  rOPINIRole (REQUIP) 0.5 MG tablet, TAKE 2 TABLETS BY MOUTH EVERY NIGHT., Disp: 180 tablet, Rfl: 3  •  simvastatin (ZOCOR) 20 MG tablet, Take 1 tablet by mouth Every Night., Disp: 90 tablet, Rfl: 3  •  tamsulosin (FLOMAX) 0.4 MG capsule 24 hr capsule, TAKE 1 CAPSULE BY MOUTH EVERY NIGHT., Disp: 90 capsule, Rfl: 3  •  Testosterone 20.25 MG/ACT (1.62%) gel, APPLY 2 PUMPS ON THE SKIN ONCE DAILY, Disp: 225 g, Rfl: 5    Allergies:   No Known Allergies    Objective     Physical Exam:   Vital Signs:   Vitals:    05/18/22 1449   Weight: 62.6 kg (138 lb)   Height: 172.7 cm (68\")     Body mass index is 20.98 kg/m².     Physical Exam  Vitals and nursing note reviewed.   Constitutional:       General: He is awake. He is not in acute distress.     Appearance: Normal appearance. He is well-developed.   HENT:      Head: Normocephalic and atraumatic.      Right Ear: External ear normal.      Left Ear: External ear normal.      Nose: Nose normal.   Eyes:      Conjunctiva/sclera: Conjunctivae normal.   Pulmonary:      Effort: Pulmonary effort is normal.   Abdominal:      General: There is no distension.      Palpations: Abdomen is soft. There is no mass.      Tenderness: There is no abdominal tenderness. There is no right CVA tenderness, left CVA tenderness, guarding or rebound.      Hernia: No hernia is present. There is no hernia in the left inguinal area or right inguinal area.   Genitourinary:     Pubic Area: No rash.       Penis: Normal.       Testes: Normal.      Prostate: Normal.      Rectum: Normal. " No mass or tenderness. Normal anal tone.   Musculoskeletal:      Cervical back: Normal range of motion.   Lymphadenopathy:      Lower Body: No right inguinal adenopathy. No left inguinal adenopathy.   Skin:     General: Skin is warm.   Neurological:      General: No focal deficit present.      Mental Status: He is alert and oriented to person, place, and time.   Psychiatric:         Behavior: Behavior normal. Behavior is cooperative.         Labs:   Brief Urine Lab Results  (Last result in the past 365 days)      Color   Clarity   Blood   Leuk Est   Nitrite   Protein   CREAT   Urine HCG        05/04/22 1422 Yellow   Clear   Negative   Negative   Negative   Negative                      Lab Results   Component Value Date    GLUCOSE 131 (H) 12/14/2021    CALCIUM 9.3 12/14/2021     (L) 12/14/2021    K 4.6 12/14/2021    CO2 28.7 12/14/2021    CL 98 12/14/2021    BUN 12 12/14/2021    CREATININE 0.74 (L) 12/14/2021    EGFRIFAFRI 125 12/14/2021    EGFRIFNONA 103 12/14/2021    BCR 16.2 12/14/2021    ANIONGAP 13.8 05/07/2021       Lab Results   Component Value Date    WBC 9.27 12/14/2021    HGB 14.8 12/14/2021    HCT 44.4 12/14/2021    MCV 90.6 12/14/2021     12/14/2021       Lab Results   Component Value Date    PSA 0.1 04/13/2022       Images:   No Images in the past 120 days found..    Pathology:      Measures:   Tobacco:   Torin Granados  reports that he has been smoking cigarettes. He has a 20.00 pack-year smoking history. He has never used smokeless tobacco.  Urine Incontinence: Patient reports that he is not currently experiencing any symptoms of urinary incontinence.        Assessment / Plan      Assessment/Plan:   76 y.o. male who presented today for follow up of BPH and LUTS.  Based on his findings we will plan on Greenlight PVP.  I discussed his options with him which included transurethral resection of the prostate versus greenlight laser vaporization of the prostate.  Based on his work-up he has an  extremely high bladder neck and a small prostate so he would likely benefit from incision.  I discussed the specific risks and benefits of each procedure and he has elected to move forward with greenlight laser vaporization.  We will have him scheduled at our next available date.  I discussed the risks of incontinence and erectile dysfunction as well.    Diagnoses and all orders for this visit:    1. Urinary retention (Primary)  -     Case Request; Standing  -     COVID PRE-OP / PRE-PROCEDURE SCREENING ORDER (NO ISOLATION) - Swab, Nasopharynx; Future  -     CBC (No Diff); Future  -     Basic Metabolic Panel; Future  -     Protime-INR; Future  -     Urinalysis With Culture If Indicated -; Future  -     Case Request    2. Benign prostatic hyperplasia with incomplete bladder emptying   -     Case Request; Standing  -     Case Request    3. Hematuria, unspecified type   -     Protime-INR; Future    Other orders  -     Follow Anesthesia Guidelines / Standing Orders; Future  -     Provide NPO Instructions to Patient; Future  -     Chlorhexidine Skin Prep; Future           Follow Up:   Return for Follow up after surgery.    I spent approximately 40 minutes providing clinical care for this patient; including review of patient's chart and provider documentation, face to face time spent with patient in examination room (obtaining history, performing physical exam, discussing diagnosis and management options), placing orders, and completing patient documentation.     Greg Barraza MD  [facility]

## 2022-05-19 PROBLEM — R33.9 URINARY RETENTION: Status: ACTIVE | Noted: 2022-05-19

## 2022-05-31 ENCOUNTER — LAB (OUTPATIENT)
Dept: LAB | Facility: HOSPITAL | Age: 77
End: 2022-05-31

## 2022-05-31 ENCOUNTER — PRE-ADMISSION TESTING (OUTPATIENT)
Dept: PREADMISSION TESTING | Facility: HOSPITAL | Age: 77
End: 2022-05-31

## 2022-05-31 DIAGNOSIS — R33.9 URINARY RETENTION: ICD-10-CM

## 2022-05-31 LAB
ANION GAP SERPL CALCULATED.3IONS-SCNC: 11 MMOL/L (ref 5–15)
BUN SERPL-MCNC: 11 MG/DL (ref 8–23)
BUN/CREAT SERPL: 15.1 (ref 7–25)
CALCIUM SPEC-SCNC: 9.1 MG/DL (ref 8.6–10.5)
CHLORIDE SERPL-SCNC: 97 MMOL/L (ref 98–107)
CO2 SERPL-SCNC: 22 MMOL/L (ref 22–29)
CREAT SERPL-MCNC: 0.73 MG/DL (ref 0.76–1.27)
DEPRECATED RDW RBC AUTO: 43 FL (ref 37–54)
EGFRCR SERPLBLD CKD-EPI 2021: 95.5 ML/MIN/1.73
ERYTHROCYTE [DISTWIDTH] IN BLOOD BY AUTOMATED COUNT: 13.4 % (ref 12.3–15.4)
GLUCOSE SERPL-MCNC: 124 MG/DL (ref 65–99)
HCT VFR BLD AUTO: 40.5 % (ref 37.5–51)
HGB BLD-MCNC: 13.4 G/DL (ref 13–17.7)
MCH RBC QN AUTO: 29 PG (ref 26.6–33)
MCHC RBC AUTO-ENTMCNC: 33.1 G/DL (ref 31.5–35.7)
MCV RBC AUTO: 87.7 FL (ref 79–97)
PLATELET # BLD AUTO: 238 10*3/MM3 (ref 140–450)
PMV BLD AUTO: 9.4 FL (ref 6–12)
POTASSIUM SERPL-SCNC: 4.4 MMOL/L (ref 3.5–5.2)
RBC # BLD AUTO: 4.62 10*6/MM3 (ref 4.14–5.8)
SODIUM SERPL-SCNC: 130 MMOL/L (ref 136–145)
WBC NRBC COR # BLD: 8.17 10*3/MM3 (ref 3.4–10.8)

## 2022-05-31 PROCEDURE — U0004 COV-19 TEST NON-CDC HGH THRU: HCPCS

## 2022-05-31 PROCEDURE — 36415 COLL VENOUS BLD VENIPUNCTURE: CPT

## 2022-05-31 PROCEDURE — 80048 BASIC METABOLIC PNL TOTAL CA: CPT

## 2022-05-31 PROCEDURE — C9803 HOPD COVID-19 SPEC COLLECT: HCPCS

## 2022-05-31 PROCEDURE — 85027 COMPLETE CBC AUTOMATED: CPT

## 2022-05-31 PROCEDURE — 93010 ELECTROCARDIOGRAM REPORT: CPT | Performed by: INTERNAL MEDICINE

## 2022-05-31 PROCEDURE — 93005 ELECTROCARDIOGRAM TRACING: CPT

## 2022-05-31 RX ORDER — DULOXETIN HYDROCHLORIDE 60 MG/1
60 CAPSULE, DELAYED RELEASE ORAL DAILY
COMMUNITY
End: 2022-06-15

## 2022-05-31 RX ORDER — MULTIPLE VITAMINS W/ MINERALS TAB 9MG-400MCG
1 TAB ORAL DAILY
COMMUNITY
End: 2023-01-11

## 2022-05-31 RX ORDER — MULTIVIT WITH MINERALS/LUTEIN
1000 TABLET ORAL DAILY
COMMUNITY
End: 2022-06-16

## 2022-06-01 LAB
QT INTERVAL: 366 MS
QTC INTERVAL: 419 MS
SARS-COV-2 RNA PNL SPEC NAA+PROBE: NOT DETECTED

## 2022-06-02 ENCOUNTER — APPOINTMENT (OUTPATIENT)
Dept: GENERAL RADIOLOGY | Facility: HOSPITAL | Age: 77
End: 2022-06-02

## 2022-06-02 ENCOUNTER — ANESTHESIA (OUTPATIENT)
Dept: PERIOP | Facility: HOSPITAL | Age: 77
End: 2022-06-02

## 2022-06-02 ENCOUNTER — ANESTHESIA EVENT (OUTPATIENT)
Dept: PERIOP | Facility: HOSPITAL | Age: 77
End: 2022-06-02

## 2022-06-02 ENCOUNTER — HOSPITAL ENCOUNTER (OUTPATIENT)
Facility: HOSPITAL | Age: 77
Discharge: HOME | End: 2022-06-02
Attending: UROLOGY | Admitting: UROLOGY

## 2022-06-02 VITALS
TEMPERATURE: 97.6 F | OXYGEN SATURATION: 97 % | SYSTOLIC BLOOD PRESSURE: 165 MMHG | WEIGHT: 135.8 LBS | HEART RATE: 75 BPM | HEIGHT: 68 IN | BODY MASS INDEX: 20.58 KG/M2 | DIASTOLIC BLOOD PRESSURE: 73 MMHG | RESPIRATION RATE: 18 BRPM

## 2022-06-02 DIAGNOSIS — N40.1 BENIGN PROSTATIC HYPERPLASIA WITH INCOMPLETE BLADDER EMPTYING: ICD-10-CM

## 2022-06-02 DIAGNOSIS — R33.9 URINARY RETENTION: ICD-10-CM

## 2022-06-02 DIAGNOSIS — R39.14 BENIGN PROSTATIC HYPERPLASIA WITH INCOMPLETE BLADDER EMPTYING: ICD-10-CM

## 2022-06-02 DIAGNOSIS — R39.9 LOWER URINARY TRACT SYMPTOMS (LUTS): Primary | ICD-10-CM

## 2022-06-02 LAB — GLUCOSE BLDC GLUCOMTR-MCNC: 128 MG/DL (ref 70–130)

## 2022-06-02 PROCEDURE — A9270 NON-COVERED ITEM OR SERVICE: HCPCS | Performed by: UROLOGY

## 2022-06-02 PROCEDURE — 25010000002 FENTANYL CITRATE (PF) 50 MCG/ML SOLUTION: Performed by: NURSE ANESTHETIST, CERTIFIED REGISTERED

## 2022-06-02 PROCEDURE — 0 CEFAZOLIN SODIUM-DEXTROSE 2-3 GM-%(50ML) RECONSTITUTED SOLUTION: Performed by: UROLOGY

## 2022-06-02 PROCEDURE — 87086 URINE CULTURE/COLONY COUNT: CPT | Performed by: UROLOGY

## 2022-06-02 PROCEDURE — 25010000002 ONDANSETRON PER 1 MG: Performed by: NURSE ANESTHETIST, CERTIFIED REGISTERED

## 2022-06-02 PROCEDURE — 25010000002 PROPOFOL 10 MG/ML EMULSION: Performed by: NURSE ANESTHETIST, CERTIFIED REGISTERED

## 2022-06-02 PROCEDURE — 82962 GLUCOSE BLOOD TEST: CPT

## 2022-06-02 PROCEDURE — 52648 LASER SURGERY OF PROSTATE: CPT | Performed by: UROLOGY

## 2022-06-02 PROCEDURE — 63710000001 OPIUM-BELLADONNA 16.2-30 MG SUPPOSITORY: Performed by: UROLOGY

## 2022-06-02 PROCEDURE — 25010000002 FUROSEMIDE PER 20 MG: Performed by: NURSE ANESTHETIST, CERTIFIED REGISTERED

## 2022-06-02 RX ORDER — MEPERIDINE HYDROCHLORIDE 25 MG/ML
12.5 INJECTION INTRAMUSCULAR; INTRAVENOUS; SUBCUTANEOUS
Status: DISCONTINUED | OUTPATIENT
Start: 2022-06-02 | End: 2022-06-02 | Stop reason: HOSPADM

## 2022-06-02 RX ORDER — MAGNESIUM HYDROXIDE 1200 MG/15ML
LIQUID ORAL AS NEEDED
Status: DISCONTINUED | OUTPATIENT
Start: 2022-06-02 | End: 2022-06-02 | Stop reason: HOSPADM

## 2022-06-02 RX ORDER — FENTANYL CITRATE 50 UG/ML
INJECTION, SOLUTION INTRAMUSCULAR; INTRAVENOUS AS NEEDED
Status: DISCONTINUED | OUTPATIENT
Start: 2022-06-02 | End: 2022-06-02 | Stop reason: SURG

## 2022-06-02 RX ORDER — ATROPA BELLADONNA AND OPIUM 16.2; 3 MG/1; MG/1
SUPPOSITORY RECTAL AS NEEDED
Status: DISCONTINUED | OUTPATIENT
Start: 2022-06-02 | End: 2022-06-02 | Stop reason: HOSPADM

## 2022-06-02 RX ORDER — PROPOFOL 10 MG/ML
VIAL (ML) INTRAVENOUS AS NEEDED
Status: DISCONTINUED | OUTPATIENT
Start: 2022-06-02 | End: 2022-06-02 | Stop reason: SURG

## 2022-06-02 RX ORDER — ONDANSETRON 2 MG/ML
4 INJECTION INTRAMUSCULAR; INTRAVENOUS AS NEEDED
Status: DISCONTINUED | OUTPATIENT
Start: 2022-06-02 | End: 2022-06-02 | Stop reason: HOSPADM

## 2022-06-02 RX ORDER — ONDANSETRON 2 MG/ML
INJECTION INTRAMUSCULAR; INTRAVENOUS AS NEEDED
Status: DISCONTINUED | OUTPATIENT
Start: 2022-06-02 | End: 2022-06-02 | Stop reason: SURG

## 2022-06-02 RX ORDER — OXYCODONE HYDROCHLORIDE AND ACETAMINOPHEN 5; 325 MG/1; MG/1
1 TABLET ORAL ONCE AS NEEDED
Status: DISCONTINUED | OUTPATIENT
Start: 2022-06-02 | End: 2022-06-02 | Stop reason: HOSPADM

## 2022-06-02 RX ORDER — LIDOCAINE HYDROCHLORIDE 20 MG/ML
INJECTION, SOLUTION INFILTRATION; PERINEURAL AS NEEDED
Status: DISCONTINUED | OUTPATIENT
Start: 2022-06-02 | End: 2022-06-02 | Stop reason: SURG

## 2022-06-02 RX ORDER — SODIUM CHLORIDE, SODIUM LACTATE, POTASSIUM CHLORIDE, CALCIUM CHLORIDE 600; 310; 30; 20 MG/100ML; MG/100ML; MG/100ML; MG/100ML
125 INJECTION, SOLUTION INTRAVENOUS ONCE
Status: COMPLETED | OUTPATIENT
Start: 2022-06-02 | End: 2022-06-02

## 2022-06-02 RX ORDER — CEPHALEXIN 500 MG/1
500 CAPSULE ORAL 2 TIMES DAILY
Qty: 10 CAPSULE | Refills: 0 | Status: SHIPPED | OUTPATIENT
Start: 2022-06-02 | End: 2022-06-07

## 2022-06-02 RX ORDER — FAMOTIDINE 10 MG/ML
INJECTION, SOLUTION INTRAVENOUS AS NEEDED
Status: DISCONTINUED | OUTPATIENT
Start: 2022-06-02 | End: 2022-06-02 | Stop reason: SURG

## 2022-06-02 RX ORDER — FENTANYL CITRATE 50 UG/ML
50 INJECTION, SOLUTION INTRAMUSCULAR; INTRAVENOUS
Status: DISCONTINUED | OUTPATIENT
Start: 2022-06-02 | End: 2022-06-02 | Stop reason: HOSPADM

## 2022-06-02 RX ORDER — SODIUM CHLORIDE, SODIUM LACTATE, POTASSIUM CHLORIDE, CALCIUM CHLORIDE 600; 310; 30; 20 MG/100ML; MG/100ML; MG/100ML; MG/100ML
INJECTION, SOLUTION INTRAVENOUS CONTINUOUS PRN
Status: DISCONTINUED | OUTPATIENT
Start: 2022-06-02 | End: 2022-06-02 | Stop reason: SURG

## 2022-06-02 RX ORDER — MIDAZOLAM HYDROCHLORIDE 1 MG/ML
0.5 INJECTION INTRAMUSCULAR; INTRAVENOUS
Status: DISCONTINUED | OUTPATIENT
Start: 2022-06-02 | End: 2022-06-02 | Stop reason: HOSPADM

## 2022-06-02 RX ORDER — DOCUSATE SODIUM 100 MG/1
100 CAPSULE, LIQUID FILLED ORAL 2 TIMES DAILY
Qty: 30 CAPSULE | Refills: 0 | Status: SHIPPED | OUTPATIENT
Start: 2022-06-02 | End: 2022-06-16

## 2022-06-02 RX ORDER — SODIUM CHLORIDE 0.9 % (FLUSH) 0.9 %
10 SYRINGE (ML) INJECTION AS NEEDED
Status: DISCONTINUED | OUTPATIENT
Start: 2022-06-02 | End: 2022-06-02 | Stop reason: HOSPADM

## 2022-06-02 RX ORDER — FUROSEMIDE 10 MG/ML
INJECTION INTRAMUSCULAR; INTRAVENOUS AS NEEDED
Status: DISCONTINUED | OUTPATIENT
Start: 2022-06-02 | End: 2022-06-02 | Stop reason: SURG

## 2022-06-02 RX ORDER — SODIUM CHLORIDE 0.9 % (FLUSH) 0.9 %
10 SYRINGE (ML) INJECTION EVERY 12 HOURS SCHEDULED
Status: DISCONTINUED | OUTPATIENT
Start: 2022-06-02 | End: 2022-06-02 | Stop reason: HOSPADM

## 2022-06-02 RX ORDER — CEFAZOLIN SODIUM 2 G/50ML
2 SOLUTION INTRAVENOUS ONCE
Status: COMPLETED | OUTPATIENT
Start: 2022-06-02 | End: 2022-06-02

## 2022-06-02 RX ORDER — IPRATROPIUM BROMIDE AND ALBUTEROL SULFATE 2.5; .5 MG/3ML; MG/3ML
3 SOLUTION RESPIRATORY (INHALATION) ONCE AS NEEDED
Status: DISCONTINUED | OUTPATIENT
Start: 2022-06-02 | End: 2022-06-02 | Stop reason: HOSPADM

## 2022-06-02 RX ORDER — SODIUM CHLORIDE, SODIUM LACTATE, POTASSIUM CHLORIDE, CALCIUM CHLORIDE 600; 310; 30; 20 MG/100ML; MG/100ML; MG/100ML; MG/100ML
100 INJECTION, SOLUTION INTRAVENOUS ONCE AS NEEDED
Status: DISCONTINUED | OUTPATIENT
Start: 2022-06-02 | End: 2022-06-02 | Stop reason: HOSPADM

## 2022-06-02 RX ORDER — PHENAZOPYRIDINE HYDROCHLORIDE 200 MG/1
200 TABLET, FILM COATED ORAL 3 TIMES DAILY PRN
Qty: 20 TABLET | Refills: 0 | Status: SHIPPED | OUTPATIENT
Start: 2022-06-02 | End: 2022-06-16

## 2022-06-02 RX ADMIN — FENTANYL CITRATE 100 MCG: 50 INJECTION INTRAMUSCULAR; INTRAVENOUS at 09:42

## 2022-06-02 RX ADMIN — FENTANYL CITRATE 100 MCG: 50 INJECTION INTRAMUSCULAR; INTRAVENOUS at 08:58

## 2022-06-02 RX ADMIN — PROPOFOL 150 MG: 10 INJECTION, EMULSION INTRAVENOUS at 08:58

## 2022-06-02 RX ADMIN — LIDOCAINE HYDROCHLORIDE 60 MG: 20 INJECTION, SOLUTION INFILTRATION; PERINEURAL at 08:58

## 2022-06-02 RX ADMIN — FAMOTIDINE 20 MG: 10 INJECTION INTRAVENOUS at 08:58

## 2022-06-02 RX ADMIN — SODIUM CHLORIDE, POTASSIUM CHLORIDE, SODIUM LACTATE AND CALCIUM CHLORIDE: 600; 310; 30; 20 INJECTION, SOLUTION INTRAVENOUS at 08:53

## 2022-06-02 RX ADMIN — SODIUM CHLORIDE, POTASSIUM CHLORIDE, SODIUM LACTATE AND CALCIUM CHLORIDE 125 ML/HR: 600; 310; 30; 20 INJECTION, SOLUTION INTRAVENOUS at 07:13

## 2022-06-02 RX ADMIN — ONDANSETRON 4 MG: 2 INJECTION INTRAMUSCULAR; INTRAVENOUS at 08:58

## 2022-06-02 RX ADMIN — CEFAZOLIN SODIUM 2 G: 2 SOLUTION INTRAVENOUS at 08:57

## 2022-06-02 RX ADMIN — FUROSEMIDE 20 MG: 10 INJECTION, SOLUTION INTRAMUSCULAR; INTRAVENOUS at 09:44

## 2022-06-02 NOTE — ANESTHESIA PROCEDURE NOTES
Airway  Urgency: elective    Date/Time: 6/2/2022 8:58 AM    General Information and Staff    Patient location during procedure: OR  CRNA/CAA: Marisa Malave CRNA    Indications and Patient Condition    Preoxygenated: yes      Final Airway Details  Final airway type: supraglottic airway      Successful airway: unique  Size 4    Number of attempts at approach: 1  Assessment: lips, teeth, and gum same as pre-op and atraumatic intubation

## 2022-06-02 NOTE — OP NOTE
CYSTOSCOPY TRANSURETHRAL RESECTION OF PROSTATE GREENLIGHT  Procedure Note    Torin Granados  6/2/2022    Pre-op Diagnosis:   Benign prostatic hyperplasia with incomplete bladder emptying [N40.1, R39.14]  Urinary retention [R33.9]    Post-op Diagnosis:     Post-Op Diagnosis Codes:     * Benign prostatic hyperplasia with incomplete bladder emptying [N40.1, R39.14]     * Urinary retention [R33.9]    Procedure/CPT® Codes:      Procedure(s):  CYSTOSCOPY TRANSURETHRAL RESECTION OF PROSTATE GREENLIGHT    Surgeon(s):  Greg Barraza MD    Anesthesia: see anesthesia record    Staff:   Circulator: Cande Jennings RN  Scrub Person: Raven Bass LPN; Niki De Los Santos    Estimated Blood Loss: minimal  Urine Voided: * No values recorded between 6/2/2022  8:54 AM and 6/2/2022  9:54 AM *    Specimens:                ID Type Source Tests Collected by Time   1 :  Urine Urine, Catheter URINE CULTURE Greg Barraza MD 6/2/2022 0915         Drains: 20f knight catheter with 30 cc balloon    Findings:   1.  Cystoscopy with no masses or lesions within the walls of the bladder  2.  Lateral lobe coaptation of the prostate with a very bladder neck  3.  Moderate amount of trabeculation within the walls of the bladder  4.  Complete vaporization with wide open channel at the end of the case  5.  Completion cystoscopy with no evidence of injury to the ureteral orifices or bladder  6.  20 Kazakh Knight catheter with 30 cc balloon with clear urine draining at the end of the case    Blood: N/A    Complications: None immediate    Indication: Mr. Granados is a 76-year-old gentleman who presented with obstructing lower urinary tract symptoms.  He reports his symptoms had been worsening over the past year and he wakes up up to 5 times a night.  He also has dribbling and intermittency.  He is already on tamsulosin and finasteride.  He was found to have an approximately 30 g prostate and after discussing his options he elected to move  forward with greenlight laser vaporization.  His PSA was 22.    Description of Procedure: The patient was seen and examined in the preoperative area.  The risk, benefits, and alternatives were explained to the patient and his family and informed consent was obtained.  He was then taken to the operating theater placed on the table in a supine position.  Venodyne boots were placed on the bilateral lower extremities and general anesthesia was induced without any complications.  He was placed in the dorsal lithotomy position with all pressure points padded and secured.  He was prepped and draped in the usual sterile fashion and a timeout was taken.     A 20 Telugu rigid greenlight scope was then passed through the urethra and into the bladder.  A cystoscopy was performed in a systematic fashion and there were no masses or lesions noted within the walls of the bladder.  We then inserted the greenlight laser fiber and using a setting of 80 began our resection of the lateral lobes at the bladder neck.  Once this was vaporized we increased the setting to 100 J and completed our vaporization.  This was carried from the bladder neck all the way to the verumontanum being careful not to vaporize beyond the veru.  Once they large channel had been created the water was turned off to assess for any bleeding.  Any sites of bleeders were cauterized.  The bladder was drained and the area was inspected again and hemostasis was excellent.  The bladder was reinspected for any injury of which there was none.  Both the ureteral orifices were intact.  The bladder was filled and the scope was withdrawn.  A 20 Telugu Patel catheter was inserted with clear urine draining.  30 cc was instilled into the balloon.  He was given lidocaine jelly as well as 20 mg of Lasix.  The patient tolerated the procedure well and there was no complication to the procedure.  He was taken the PACU in stable and extubated condition.    Disposition: The patient  will be discharged home once PACU criteria is met.  He was given prescription for Pyridium as well as Keflex and Colace.  I will have him follow-up Monday, June 6, 2022 for catheter removal.    Greg Barraza MD     Date: 6/2/2022  Time: 09:56 EDT

## 2022-06-02 NOTE — ANESTHESIA POSTPROCEDURE EVALUATION
Patient: Torin Granados    Procedure Summary     Date: 06/02/22 Room / Location: [facility] OR  /  [facility] OR    Anesthesia Start: 0853 Anesthesia Stop: 0954    Procedure: CYSTOSCOPY TRANSURETHRAL RESECTION OF PROSTATE GREENLIGHT (N/A ) Diagnosis:       Benign prostatic hyperplasia with incomplete bladder emptying      Urinary retention      (Benign prostatic hyperplasia with incomplete bladder emptying [N40.1, R39.14])      (Urinary retention [R33.9])    Surgeons: Greg Barraza MD Provider: Ivan Atkins MD    Anesthesia Type: general ASA Status: 3          Anesthesia Type: general    Vitals  Vitals Value Taken Time   /83 06/02/22 1025   Temp 97 °F (36.1 °C) 06/02/22 0955   Pulse 70 06/02/22 1025   Resp 16 06/02/22 1025   SpO2 96 % 06/02/22 1025           Post Anesthesia Care and Evaluation    Patient location during evaluation: PHASE II  Patient participation: complete - patient participated  Level of consciousness: awake and alert  Pain score: 0  Pain management: adequate  Airway patency: patent  Anesthetic complications: No anesthetic complications    Cardiovascular status: acceptable  Respiratory status: acceptable  Hydration status: acceptable

## 2022-06-02 NOTE — ANESTHESIA PREPROCEDURE EVALUATION
Anesthesia Evaluation     Patient summary reviewed and Nursing notes reviewed   history of anesthetic complications:  NPO Solid Status: > 8 hours  NPO Liquid Status: > 8 hours           Airway   Mallampati: II  TM distance: >3 FB  Neck ROM: full  Dental - normal exam     Pulmonary     breath sounds clear to auscultation  (+) COPD moderate,   Cardiovascular   Exercise tolerance: good (4-7 METS)    Rhythm: regular  Rate: normal    (+) hypertension, CAD, hyperlipidemia,       Neuro/Psych- negative ROS  GI/Hepatic/Renal/Endo    (+)  GERD, PUD,  diabetes mellitus,     Musculoskeletal     Abdominal     Abdomen: soft.   Substance History      OB/GYN          Other   arthritis,    history of cancer (skin cancer)                  Anesthesia Plan    ASA 3     general     intravenous induction     Anesthetic plan, all risks, benefits, and alternatives have been provided, discussed and informed consent has been obtained with: patient.  Use of blood products discussed with consented to blood products.   Plan discussed with CRNA.        CODE STATUS:

## 2022-06-03 LAB — BACTERIA SPEC AEROBE CULT: NO GROWTH

## 2022-06-15 DIAGNOSIS — E11.9 TYPE 2 DIABETES MELLITUS WITHOUT COMPLICATION, WITHOUT LONG-TERM CURRENT USE OF INSULIN: ICD-10-CM

## 2022-06-15 DIAGNOSIS — E78.49 OTHER HYPERLIPIDEMIA: ICD-10-CM

## 2022-06-15 RX ORDER — DULOXETIN HYDROCHLORIDE 60 MG/1
CAPSULE, DELAYED RELEASE ORAL
Qty: 180 CAPSULE | Refills: 3 | Status: SHIPPED | OUTPATIENT
Start: 2022-06-15 | End: 2022-11-30 | Stop reason: SDUPTHER

## 2022-06-15 RX ORDER — SIMVASTATIN 20 MG
20 TABLET ORAL NIGHTLY
Qty: 90 TABLET | Refills: 3 | Status: SHIPPED | OUTPATIENT
Start: 2022-06-15 | End: 2022-11-18

## 2022-06-16 ENCOUNTER — OFFICE VISIT (OUTPATIENT)
Dept: FAMILY MEDICINE CLINIC | Facility: CLINIC | Age: 77
End: 2022-06-16

## 2022-06-16 VITALS
OXYGEN SATURATION: 96 % | SYSTOLIC BLOOD PRESSURE: 156 MMHG | HEIGHT: 68 IN | RESPIRATION RATE: 14 BRPM | BODY MASS INDEX: 20.61 KG/M2 | DIASTOLIC BLOOD PRESSURE: 85 MMHG | TEMPERATURE: 98.6 F | HEART RATE: 88 BPM | WEIGHT: 136 LBS

## 2022-06-16 DIAGNOSIS — E78.2 MIXED HYPERLIPIDEMIA: ICD-10-CM

## 2022-06-16 DIAGNOSIS — M85.89 OSTEOPENIA OF MULTIPLE SITES: ICD-10-CM

## 2022-06-16 DIAGNOSIS — Z85.828 HISTORY OF NONMELANOMA SKIN CANCER: ICD-10-CM

## 2022-06-16 DIAGNOSIS — G25.81 RLS (RESTLESS LEGS SYNDROME): ICD-10-CM

## 2022-06-16 DIAGNOSIS — M54.59 MECHANICAL LOW BACK PAIN: ICD-10-CM

## 2022-06-16 DIAGNOSIS — J44.9 MIXED TYPE COPD (CHRONIC OBSTRUCTIVE PULMONARY DISEASE): ICD-10-CM

## 2022-06-16 DIAGNOSIS — I70.0 ATHEROSCLEROSIS OF AORTA: Primary | ICD-10-CM

## 2022-06-16 DIAGNOSIS — I10 ESSENTIAL HYPERTENSION: ICD-10-CM

## 2022-06-16 DIAGNOSIS — Z00.00 HEALTHCARE MAINTENANCE: ICD-10-CM

## 2022-06-16 DIAGNOSIS — E11.9 TYPE 2 DIABETES MELLITUS WITHOUT COMPLICATION, WITHOUT LONG-TERM CURRENT USE OF INSULIN: ICD-10-CM

## 2022-06-16 DIAGNOSIS — R35.0 BENIGN PROSTATIC HYPERPLASIA WITH URINARY FREQUENCY: ICD-10-CM

## 2022-06-16 DIAGNOSIS — I25.10 CORONARY ARTERY CALCIFICATION SEEN ON CT SCAN: ICD-10-CM

## 2022-06-16 DIAGNOSIS — K21.9 GASTROESOPHAGEAL REFLUX DISEASE WITHOUT ESOPHAGITIS: ICD-10-CM

## 2022-06-16 DIAGNOSIS — Z86.010 HISTORY OF COLONIC POLYPS: ICD-10-CM

## 2022-06-16 DIAGNOSIS — Z87.891 PERSONAL HISTORY OF NICOTINE DEPENDENCE: ICD-10-CM

## 2022-06-16 DIAGNOSIS — F41.8 ANXIETY ASSOCIATED WITH DEPRESSION: ICD-10-CM

## 2022-06-16 DIAGNOSIS — F17.200 SMOKER: ICD-10-CM

## 2022-06-16 DIAGNOSIS — N40.1 BENIGN PROSTATIC HYPERPLASIA WITH URINARY FREQUENCY: ICD-10-CM

## 2022-06-16 PROCEDURE — 99214 OFFICE O/P EST MOD 30 MIN: CPT | Performed by: GENERAL PRACTICE

## 2022-06-16 RX ORDER — LANSOPRAZOLE 30 MG/1
30 CAPSULE, DELAYED RELEASE ORAL DAILY
Qty: 90 CAPSULE | Refills: 3 | Status: SHIPPED | OUTPATIENT
Start: 2022-06-16 | End: 2022-11-18

## 2022-06-16 NOTE — PROGRESS NOTES
Subjective   Torin Granados is a 76 y.o. male.     Chief Complaint  He returns for a scheduled reassessment of multiple medical problems including BPH post recent TURP, type 2 diabetes mellitus, hyperlipidemia, and essential hypertension    History of Present Illness     BPH  He underwent a TURP greenlight by Dr. Barraza on 6/2/2022.  His course was uncomplicated and his bladder catheter removed 4 days afterward.  He continues to have mild urgency and nocturia x 2, but has noted an improvement in his stream and sense of incomplete voiding.  He experienced dysuria initially but this has resolved and he denies any hematuria, fever, or chills.  He is not scheduled to undergo a urology reassessment at present.  He has been taken off both finasteride and tamsulosin  Lab Results   Component Value Date    PSA 0.1 04/13/2022     Lab Results   Component Value Date    WBC 8.17 05/31/2022    HGB 13.4 05/31/2022    HCT 40.5 05/31/2022    MCV 87.7 05/31/2022     05/31/2022     Type 2 Diabetes Mellitus  He admits to numbness and tingling of the feet.  He continues to deny any visual disturbances, polydipsia, polyuria, hypoglycemia or foot ulcerations. Evaluation to date has been: hemoglobin A1C. Home sugars: have remained at goal. Current treatments: metformin and GLP agonist -liraglutide and basal insulin (together as xultophy -16 units daily). He underwent an updated diabetic eye exam on 10/18/2021    Hyperlipidemia  His compliance with treatment remains quite good. He is taking simvastatin and ezetimibe with no apparent side effects    Hypertension  Home blood pressure readings: not doing. Associated signs and symptoms: none.  He continues to deny any chest pain, palpitations, dyspnea, orthopnea, paroxysmal nocturnal dyspnea or peripheral edema. Current antihypertensive medications includes lisinopril.  Unclear why, but he apparently ran out of this since last here  Lab Results   Component Value Date    GLUCOSE 124 (H)  05/31/2022    CALCIUM 9.1 05/31/2022     (L) 05/31/2022    K 4.4 05/31/2022    CO2 22.0 05/31/2022    CL 97 (L) 05/31/2022    BUN 11 05/31/2022    CREATININE 0.73 (L) 05/31/2022    EGFRIFAFRI 125 12/14/2021    EGFRIFNONA 103 12/14/2021    BCR 15.1 05/31/2022    ANIONGAP 11.0 05/31/2022     Restless Leg Syndrome  He has a long history of intermittent lower extremity discomfort.  He describes this as a tight ache centered about his knees.  Episodes occur primarily at night.  He admits to occasional knee stiffness but denies any swelling.  He remains on ropinirole 0.5-1 nightly with a dramatic improvement in his symptoms and no apparent side effects thus far    The following portions of the patient's history were reviewed and updated as appropriate: allergies, current medications, past medical history, past social history and problem list.    Review of Systems   Constitutional: Negative for appetite change, chills, fatigue, fever and unexpected weight change.   HENT: Positive for rhinorrhea and sneezing. Negative for congestion, ear pain, postnasal drip, sore throat and voice change.    Eyes: Negative for visual disturbance.   Respiratory: Negative for cough, shortness of breath and wheezing.    Cardiovascular: Negative for chest pain, palpitations and leg swelling.   Gastrointestinal: Negative for abdominal pain, blood in stool, constipation, diarrhea, nausea and vomiting.   Endocrine: Negative for polydipsia and polyuria.   Genitourinary: Positive for urgency. Negative for difficulty urinating, dysuria, frequency and hematuria.        Nocturia x 2   Musculoskeletal: Positive for arthralgias and back pain. Negative for myalgias.   Skin: Negative for rash.   Neurological: Positive for numbness. Negative for tremors, weakness and headaches.   Psychiatric/Behavioral: Negative for dysphoric mood and sleep disturbance. The patient is not nervous/anxious.      Objective   Physical Exam  Constitutional:       General:  He is not in acute distress.     Appearance: Normal appearance. He is well-developed. He is not ill-appearing or diaphoretic.      Comments: Bright and in good spirits. No apparent distress. No pallor, jaundice, diaphoresis, or cyanosis.   HENT:      Head: Atraumatic.      Right Ear: Tympanic membrane, ear canal and external ear normal.      Left Ear: Tympanic membrane, ear canal and external ear normal.   Eyes:      Conjunctiva/sclera: Conjunctivae normal.   Neck:      Thyroid: No thyroid mass or thyromegaly.      Vascular: No carotid bruit or JVD.      Trachea: Trachea normal. No tracheal deviation.   Cardiovascular:      Rate and Rhythm: Normal rate and regular rhythm.      Heart sounds: Normal heart sounds, S1 normal and S2 normal. No murmur heard.    No gallop. No S3 or S4 sounds.   Pulmonary:      Effort: Pulmonary effort is normal.      Breath sounds: Wheezing (diffuse - mild) present.      Comments: Pulmonary hyperinflation  Chest:   Breasts:      Right: No supraclavicular adenopathy.      Left: No supraclavicular adenopathy.       Abdominal:      General: Bowel sounds are normal. There is no distension.   Musculoskeletal:      Right lower leg: No edema.      Left lower leg: No edema.      Comments: No peripheral joint redness or warmth.   Lymphadenopathy:      Head:      Right side of head: No submental, submandibular, tonsillar, preauricular, posterior auricular or occipital adenopathy.      Left side of head: No submental, submandibular, tonsillar, preauricular, posterior auricular or occipital adenopathy.      Cervical: No cervical adenopathy.      Upper Body:      Right upper body: No supraclavicular adenopathy.      Left upper body: No supraclavicular adenopathy.   Skin:     General: Skin is warm and dry.      Coloration: Skin is not cyanotic, jaundiced or pale.      Findings: No lesion or rash.      Nails: There is no clubbing.   Neurological:      Mental Status: He is alert and oriented to person,  place, and time.      Cranial Nerves: No cranial nerve deficit.      Sensory: Sensory deficit (decreased vibration sense toes of both feet) present.      Motor: No tremor.      Coordination: Coordination normal.      Gait: Gait normal.   Psychiatric:         Attention and Perception: Attention normal.         Mood and Affect: Mood normal.         Speech: Speech normal.         Behavior: Behavior normal.         Thought Content: Thought content normal.       Assessment & Plan   Problems Addressed this Visit        Cardiac and Vasculature    Atherosclerosis of aorta ([facility])  Reminded regarding risk factor modification with an emphasis on tobacco cessation.  Instructed to resume low-dose ASA    Relevant Orders    CBC & Differential    Coronary artery calcification seen on CT scan  As above.    Relevant Orders    CBC & Differential    Essential hypertension   Hypertension: elevated today. Evidence of target organ damage: coronary artery calcifications and atherosclerotic changes of the aorta on previous CT.  Encouraged to continue to work on diet and exercise plan.   Lisinopril will be resumed and his blood pressure monitor    Relevant Orders    CBC & Differential    Comprehensive Metabolic Panel    TSH    Mixed hyperlipidemia  As above.   Continue current medication.    Relevant Orders    Comprehensive Metabolic Panel    Lipid Panel    TSH       Endocrine and Metabolic    Type 2 diabetes mellitus without complication, without long-term current use of insulin ([facility])  Diabetes mellitus Type II, under unknown control.   Encouraged to continue to pursue ADA diet  Encouraged aerobic exercise.  Continue current medication  Updated labs drawn.    Relevant Orders    Hemoglobin A1c    MicroAlbumin, Urine, Random - Urine, Random Void    Vitamin B12       Gastrointestinal Abdominal     Gastroesophageal reflux disease without esophagitis    Relevant Medications    lansoprazole (PREVACID) 30 MG capsule    History of colonic  polyps  Encouraged to follow-up with scheduled colonoscopy in August       Genitourinary and Reproductive     Benign prostatic hyperplasia with urinary frequency  Post recent TURP  Appears to be doing well  Encouraged to report if this should change.       Health Encounters    Healthcare maintenance  Patient has received a second booster (fourth dose) of the mRNA COVID-19 vaccine  Reminded to get a flu shot when available  Will arrange an updated low-dose CT of the chest    Relevant Orders     CT Chest Low Dose Cancer Screening WO       Hematology and Neoplasia    History of nonmelanoma skin cancer       Mental Health    Anxiety associated with depression    Relevant Orders    TSH       Musculoskeletal and Injuries    Mechanical low back pain    Osteopenia of multiple sites    Relevant Orders    Vitamin D 25 Hydroxy       Neuro    RLS (restless legs syndrome)       Pulmonary and Pneumonias    Mixed type COPD (chronic obstructive pulmonary disease) (HCC)   COPD is stable.  Reminded of the importance of smoking cessation  Encouraged to remain as active as symptoms allow for    Relevant Orders     CT Chest Low Dose Cancer Screening WO       Tobacco    Smoker    Relevant Orders     CT Chest Low Dose Cancer Screening WO      Diagnoses       Codes Comments    Atherosclerosis of aorta (HCC)    -  Primary ICD-10-CM: I70.0  ICD-9-CM: 440.0     Coronary artery calcification seen on CT scan     ICD-10-CM: I25.10  ICD-9-CM: 414.00     Essential hypertension     ICD-10-CM: I10  ICD-9-CM: 401.9     Mixed hyperlipidemia     ICD-10-CM: E78.2  ICD-9-CM: 272.2     Type 2 diabetes mellitus without complication, without long-term current use of insulin (HCC)     ICD-10-CM: E11.9  ICD-9-CM: 250.00     Gastroesophageal reflux disease without esophagitis     ICD-10-CM: K21.9  ICD-9-CM: 530.81     History of colonic polyps     ICD-10-CM: Z86.010  ICD-9-CM: V12.72     Benign prostatic hyperplasia with urinary frequency     ICD-10-CM: N40.1,  R35.0  ICD-9-CM: 600.01, 788.41     Healthcare maintenance     ICD-10-CM: Z00.00  ICD-9-CM: V70.0     Anxiety associated with depression     ICD-10-CM: F41.8  ICD-9-CM: 300.4     History of nonmelanoma skin cancer     ICD-10-CM: Z85.828  ICD-9-CM: V10.83     Mechanical low back pain     ICD-10-CM: M54.59  ICD-9-CM: 724.2     Osteopenia of multiple sites     ICD-10-CM: M85.89  ICD-9-CM: 733.90     RLS (restless legs syndrome)     ICD-10-CM: G25.81  ICD-9-CM: 333.94     Mixed type COPD (chronic obstructive pulmonary disease) (HCC)     ICD-10-CM: J44.9  ICD-9-CM: 496     Smoker     ICD-10-CM: F17.200  ICD-9-CM: 305.1     Personal history of nicotine dependence      ICD-10-CM: Z87.891  ICD-9-CM: V15.82

## 2022-06-17 LAB
25(OH)D3+25(OH)D2 SERPL-MCNC: 45.9 NG/ML (ref 30–100)
ALBUMIN SERPL-MCNC: 4.4 G/DL (ref 3.5–5.2)
ALBUMIN/GLOB SERPL: 2.2 G/DL
ALP SERPL-CCNC: 66 U/L (ref 39–117)
ALT SERPL-CCNC: 22 U/L (ref 1–41)
AST SERPL-CCNC: 24 U/L (ref 1–40)
BASOPHILS # BLD AUTO: 0.04 10*3/MM3 (ref 0–0.2)
BASOPHILS NFR BLD AUTO: 0.4 % (ref 0–1.5)
BILIRUB SERPL-MCNC: 0.4 MG/DL (ref 0–1.2)
BUN SERPL-MCNC: 11 MG/DL (ref 8–23)
BUN/CREAT SERPL: 15.1 (ref 7–25)
CALCIUM SERPL-MCNC: 8.9 MG/DL (ref 8.6–10.5)
CHLORIDE SERPL-SCNC: 99 MMOL/L (ref 98–107)
CHOLEST SERPL-MCNC: 113 MG/DL (ref 0–200)
CO2 SERPL-SCNC: 18.6 MMOL/L (ref 22–29)
CREAT SERPL-MCNC: 0.73 MG/DL (ref 0.76–1.27)
EGFRCR SERPLBLD CKD-EPI 2021: 95.5 ML/MIN/1.73
EOSINOPHIL # BLD AUTO: 0.04 10*3/MM3 (ref 0–0.4)
EOSINOPHIL NFR BLD AUTO: 0.4 % (ref 0.3–6.2)
ERYTHROCYTE [DISTWIDTH] IN BLOOD BY AUTOMATED COUNT: 12.9 % (ref 12.3–15.4)
GLOBULIN SER CALC-MCNC: 2 GM/DL
GLUCOSE SERPL-MCNC: 64 MG/DL (ref 65–99)
HBA1C MFR BLD: 7.2 % (ref 4.8–5.6)
HCT VFR BLD AUTO: 41.8 % (ref 37.5–51)
HDLC SERPL-MCNC: 55 MG/DL (ref 40–60)
HGB BLD-MCNC: 14.5 G/DL (ref 13–17.7)
IMM GRANULOCYTES # BLD AUTO: 0.03 10*3/MM3 (ref 0–0.05)
IMM GRANULOCYTES NFR BLD AUTO: 0.3 % (ref 0–0.5)
LDLC SERPL CALC-MCNC: 44 MG/DL (ref 0–100)
LYMPHOCYTES # BLD AUTO: 2.61 10*3/MM3 (ref 0.7–3.1)
LYMPHOCYTES NFR BLD AUTO: 25.5 % (ref 19.6–45.3)
MCH RBC QN AUTO: 29.7 PG (ref 26.6–33)
MCHC RBC AUTO-ENTMCNC: 34.7 G/DL (ref 31.5–35.7)
MCV RBC AUTO: 85.7 FL (ref 79–97)
MICROALBUMIN UR-MCNC: 52.8 UG/ML
MONOCYTES # BLD AUTO: 0.58 10*3/MM3 (ref 0.1–0.9)
MONOCYTES NFR BLD AUTO: 5.7 % (ref 5–12)
NEUTROPHILS # BLD AUTO: 6.92 10*3/MM3 (ref 1.7–7)
NEUTROPHILS NFR BLD AUTO: 67.7 % (ref 42.7–76)
NRBC BLD AUTO-RTO: 0 /100 WBC (ref 0–0.2)
PLATELET # BLD AUTO: 252 10*3/MM3 (ref 140–450)
POTASSIUM SERPL-SCNC: 4.6 MMOL/L (ref 3.5–5.2)
PROT SERPL-MCNC: 6.4 G/DL (ref 6–8.5)
RBC # BLD AUTO: 4.88 10*6/MM3 (ref 4.14–5.8)
SODIUM SERPL-SCNC: 134 MMOL/L (ref 136–145)
TRIGL SERPL-MCNC: 66 MG/DL (ref 0–150)
TSH SERPL DL<=0.005 MIU/L-ACNC: 1.53 UIU/ML (ref 0.27–4.2)
VIT B12 SERPL-MCNC: 1508 PG/ML (ref 211–946)
VLDLC SERPL CALC-MCNC: 14 MG/DL (ref 5–40)
WBC # BLD AUTO: 10.22 10*3/MM3 (ref 3.4–10.8)

## 2022-06-22 DIAGNOSIS — I10 ESSENTIAL HYPERTENSION: ICD-10-CM

## 2022-06-22 RX ORDER — LISINOPRIL 10 MG/1
TABLET ORAL
Qty: 90 TABLET | Refills: 3 | Status: SHIPPED | OUTPATIENT
Start: 2022-06-22 | End: 2022-12-01 | Stop reason: SDUPTHER

## 2022-07-14 ENCOUNTER — HOSPITAL ENCOUNTER (OUTPATIENT)
Dept: CT IMAGING | Facility: HOSPITAL | Age: 77
Discharge: HOME | End: 2022-07-14
Admitting: GENERAL PRACTICE

## 2022-07-14 DIAGNOSIS — J44.9 MIXED TYPE COPD (CHRONIC OBSTRUCTIVE PULMONARY DISEASE): ICD-10-CM

## 2022-07-14 DIAGNOSIS — Z00.00 HEALTHCARE MAINTENANCE: ICD-10-CM

## 2022-07-14 DIAGNOSIS — Z87.891 PERSONAL HISTORY OF NICOTINE DEPENDENCE: ICD-10-CM

## 2022-07-14 DIAGNOSIS — F17.200 SMOKER: ICD-10-CM

## 2022-07-14 PROCEDURE — 71271 CT THORAX LUNG CANCER SCR C-: CPT | Performed by: RADIOLOGY

## 2022-07-14 PROCEDURE — 71271 CT THORAX LUNG CANCER SCR C-: CPT

## 2022-08-01 DIAGNOSIS — E78.49 OTHER HYPERLIPIDEMIA: ICD-10-CM

## 2022-08-01 RX ORDER — EZETIMIBE 10 MG/1
TABLET ORAL
Qty: 90 TABLET | Refills: 3 | Status: SHIPPED | OUTPATIENT
Start: 2022-08-01 | End: 2023-01-16 | Stop reason: SDUPTHER

## 2022-08-17 ENCOUNTER — HOSPITAL ENCOUNTER (OUTPATIENT)
Facility: HOSPITAL | Age: 77
Setting detail: HOSPITAL OUTPATIENT SURGERY
Discharge: HOME | End: 2022-08-17
Attending: INTERNAL MEDICINE | Admitting: INTERNAL MEDICINE

## 2022-08-17 ENCOUNTER — ANESTHESIA (OUTPATIENT)
Dept: GASTROENTEROLOGY | Facility: HOSPITAL | Age: 77
End: 2022-08-17

## 2022-08-17 ENCOUNTER — ANESTHESIA EVENT (OUTPATIENT)
Dept: GASTROENTEROLOGY | Facility: HOSPITAL | Age: 77
End: 2022-08-17

## 2022-08-17 VITALS
TEMPERATURE: 97.1 F | BODY MASS INDEX: 20.46 KG/M2 | SYSTOLIC BLOOD PRESSURE: 128 MMHG | HEART RATE: 64 BPM | HEIGHT: 68 IN | DIASTOLIC BLOOD PRESSURE: 70 MMHG | RESPIRATION RATE: 16 BRPM | WEIGHT: 135 LBS | OXYGEN SATURATION: 94 %

## 2022-08-17 DIAGNOSIS — Z86.010 PERSONAL HISTORY OF COLONIC POLYPS: ICD-10-CM

## 2022-08-17 LAB — GLUCOSE BLDC GLUCOMTR-MCNC: 153 MG/DL (ref 70–130)

## 2022-08-17 PROCEDURE — 88305 TISSUE EXAM BY PATHOLOGIST: CPT

## 2022-08-17 PROCEDURE — 82962 GLUCOSE BLOOD TEST: CPT

## 2022-08-17 PROCEDURE — 25010000002 PROPOFOL 200 MG/20ML EMULSION: Performed by: NURSE ANESTHETIST, CERTIFIED REGISTERED

## 2022-08-17 PROCEDURE — 45385 COLONOSCOPY W/LESION REMOVAL: CPT | Performed by: INTERNAL MEDICINE

## 2022-08-17 RX ORDER — SODIUM CHLORIDE 9 MG/ML
70 INJECTION, SOLUTION INTRAVENOUS CONTINUOUS PRN
Status: DISCONTINUED | OUTPATIENT
Start: 2022-08-17 | End: 2022-08-17 | Stop reason: HOSPADM

## 2022-08-17 RX ORDER — PROPOFOL 10 MG/ML
INJECTION, EMULSION INTRAVENOUS AS NEEDED
Status: DISCONTINUED | OUTPATIENT
Start: 2022-08-17 | End: 2022-08-17 | Stop reason: SURG

## 2022-08-17 RX ORDER — LIDOCAINE HYDROCHLORIDE 20 MG/ML
INJECTION, SOLUTION INTRAVENOUS AS NEEDED
Status: DISCONTINUED | OUTPATIENT
Start: 2022-08-17 | End: 2022-08-17 | Stop reason: SURG

## 2022-08-17 RX ORDER — SIMETHICONE 20 MG/.3ML
EMULSION ORAL AS NEEDED
Status: DISCONTINUED | OUTPATIENT
Start: 2022-08-17 | End: 2022-08-17 | Stop reason: HOSPADM

## 2022-08-17 RX ADMIN — SODIUM CHLORIDE 70 ML/HR: 9 INJECTION, SOLUTION INTRAVENOUS at 12:10

## 2022-08-17 RX ADMIN — LIDOCAINE HYDROCHLORIDE 60 MG: 20 INJECTION, SOLUTION INTRAVENOUS at 13:58

## 2022-08-17 RX ADMIN — PROPOFOL 200 MG: 10 INJECTION, EMULSION INTRAVENOUS at 13:58

## 2022-08-17 NOTE — ANESTHESIA PREPROCEDURE EVALUATION
Anesthesia Evaluation     Patient summary reviewed and Nursing notes reviewed   no history of anesthetic complications:  NPO Solid Status: > 8 hours  NPO Liquid Status: > 8 hours           Airway   Mallampati: I  TM distance: >3 FB  Neck ROM: full  no difficulty expected  Dental - normal exam     Pulmonary - normal exam   (+) COPD,   Cardiovascular - normal exam    (+) hypertension, CAD, hyperlipidemia,       Neuro/Psych  (+) numbness, psychiatric history,    GI/Hepatic/Renal/Endo    (+)  GERD, PUD,  diabetes mellitus,     Musculoskeletal     Abdominal    Substance History - negative use     OB/GYN negative ob/gyn ROS         Other   arthritis,    history of cancer                    Anesthesia Plan    ASA 3     MAC     intravenous induction     Anesthetic plan, risks, benefits, and alternatives have been provided, discussed and informed consent has been obtained with: patient.        CODE STATUS:

## 2022-08-17 NOTE — H&P
"    [facility]  HISTORY AND PHYSICAL    Patient Name: Torin Granados  : 1945  MRN: [id number]    Chief Complaint:   For surveillance colonoscopy    History Of Presenting Illness:    Personal h/o colon polyps 2017  Abnormal CTAP     Past Medical History:   Diagnosis Date   • Alcoholism (HCC)    • Anxiety associated with depression    • Arthritis    • Basal cell carcinoma (BCC) of skin of face    • Benign prostatic hyperplasia    • Colon polyp    • COPD (chronic obstructive pulmonary disease) (HCC)    • Coronary artery calcification seen on CT scan 2017   • Depression    • Diabetes mellitus (HCC)    • Enlarged prostate    • Hyperlipidemia    • Hypertension    • Ulcer        Past Surgical History:   Procedure Laterality Date   • COLONOSCOPY      over 5years ago   • COLONOSCOPY N/A 2017    Procedure: Colonoscopy  CPTCODE: 40535;  Surgeon: Rudy Velez III, MD;  Location: [facility];  Service:    • CYSTOSCOPY TRANSURETHRAL RESECTION OF PROSTATE N/A 2022    Procedure: CYSTOSCOPY TRANSURETHRAL RESECTION OF PROSTATE GREENLIGHT;  Surgeon: Greg Barraza MD;  Location: [facility];  Service: Urology;  Laterality: N/A;   • KNEE SURGERY Left     \"3-4 surgeries on left knee\" per pt   • SKIN BIOPSY      melanoma on face   • VASECTOMY  1970       Social History     Socioeconomic History   • Marital status:      Spouse name: emmie   • Number of children: 1   • Years of education: 20   Tobacco Use   • Smoking status: Current Every Day Smoker     Packs/day: 0.50     Years: 20.00     Pack years: 10.00     Types: Cigarettes   • Smokeless tobacco: Current User     Types: Snuff   Vaping Use   • Vaping Use: Never used   Substance and Sexual Activity   • Alcohol use: Not Currently     Comment: recoveringn alcoholic, no drink in 25 yrs   • Drug use: Never   • Sexual activity: Not Currently     Partners: Female     Birth control/protection: Post-menopausal       Family History   Problem " Relation Age of Onset   • Diabetes Mother    • Heart disease Mother    • Stroke Mother    • Hypertension Mother    • Diabetes Father    • Heart disease Father        Prior to Admission Medications:  Medications Prior to Admission   Medication Sig Dispense Refill Last Dose   • BD Pen Needle Courtney U/F 32G X 4 MM misc USE TO INJECT INSULIN ONCE DAILY 90 each 3 Past Week at Unknown time   • Blood Glucose Monitoring Suppl (Accu-Chek Ana Rosa Plus) w/Device kit USE TO TEST BLOOD GLUCOSE 1 kit 0 Past Week at Unknown time   • DULoxetine (CYMBALTA) 60 MG capsule TAKE 2 CAPSULES BY MOUTH ONCE DAILY 180 capsule 3 Past Week at Unknown time   • ezetimibe (ZETIA) 10 MG tablet TAKE ONE TABLET BY MOUTH EVERY DAY 90 tablet 3 Past Week at Unknown time   • glucose blood (Accu-Chek Ana Rosa Plus) test strip USE TO TEST BLOOD GLUCOSE THREE TIMES A  each 5 Past Week at Unknown time   • Insulin Degludec-Liraglutide (Xultophy) 100-3.6 UNIT-MG/ML solution pen-injector subcutaneous pen Inject 16 Units under the skin into the appropriate area as directed Daily. 15 mL 3 Past Week at Unknown time   • Lancets misc 1 three times daily 90 each 5 Past Week at Unknown time   • lansoprazole (PREVACID) 30 MG capsule Take 1 capsule by mouth Daily. 90 capsule 3 Past Week at Unknown time   • lisinopril (PRINIVIL,ZESTRIL) 10 MG tablet TAKE ONE TABLET BY MOUTH EVERY DAY 90 tablet 3 Past Week at Unknown time   • metFORMIN (GLUCOPHAGE) 1000 MG tablet TAKE ONE TABLET BY MOUTH TWO TIMES A DAY WITH MEALS 180 tablet 3 Past Week at Unknown time   • montelukast (SINGULAIR) 10 MG tablet TAKE 1 TABLET BY MOUTH DAILY. 90 tablet 3 Past Week at Unknown time   • multivitamin with minerals tablet tablet Take 1 tablet by mouth Daily.   Past Week at Unknown time   • rOPINIRole (REQUIP) 0.5 MG tablet TAKE 2 TABLETS BY MOUTH EVERY NIGHT. 180 tablet 3 Past Week at Unknown time   • simvastatin (ZOCOR) 20 MG tablet TAKE 1 TABLET BY MOUTH EVERY NIGHT. 90 tablet 3 Past Week at  Unknown time       Allergies:  No Known Allergies     Vitals: Temp:  [97.4 °F (36.3 °C)] 97.4 °F (36.3 °C)  Heart Rate:  [81] 81  Resp:  [18] 18  BP: (177)/(91) 177/91    Review Of Systems:  Constitutional:  Negative for chills, fever, and unexpected weight change.  Respiratory:  Negative for cough, chest tightness, shortness of breath, and wheezing.  Cardiovascular:  Negative for chest pain, palpitations, and leg swelling.  Gastrointestinal:  Negative for abdominal distention, abdominal pain, Nausea, vomiting.  Neurological:  Negative for Weakness, numbness, and headaches.     Physical Exam:    General Appearance:  Alert, cooperative, in no acute distress.   Lungs:   Clear to auscultation, respirations regular, even and                 unlabored.   Heart:  Regular rhythm and normal rate.   Abdomen:   Normal bowel sounds, no masses, no organomegaly. Soft, non-tender, non-distended   Neurologic: Alert and oriented x 3. Moves all four limbs equally       Plan: COLONOSCOPY (N/A)     Abad Agudelo MD  8/17/2022

## 2022-08-17 NOTE — ANESTHESIA POSTPROCEDURE EVALUATION
Patient: Torin Granados    Procedure Summary     Date: 08/17/22 Room / Location: [facility] ENDOSCOPY 2 / [facility] ENDOSCOPY    Anesthesia Start: 1357 Anesthesia Stop: 1429    Procedure: COLONOSCOPY with polypectomy x (N/A Anus) Diagnosis:       Personal history of colonic polyps      (Personal history of colonic polyps [Z86.010])    Surgeons: Abad Agudelo MD Provider: Reed Avitia CRNA    Anesthesia Type: MAC ASA Status: 3          Anesthesia Type: MAC    Vitals  No vitals data found for the desired time range.          Post Anesthesia Care and Evaluation    Patient location during evaluation: bedside  Patient participation: complete - patient participated  Level of consciousness: awake  Pain score: 0  Pain management: adequate    Airway patency: patent  Anesthetic complications: No anesthetic complications  PONV Status: controlled  Cardiovascular status: acceptable and stable  Respiratory status: acceptable and room air  Hydration status: acceptable    Comments: See nursing documentation for post op vital signs

## 2022-08-17 NOTE — DISCHARGE INSTRUCTIONS
- Discharge patient to home (ambulatory).   - High fiber diet.   - Continue present medications.   - Await pathology results.   - Repeat colonoscopy in 3 years for surveillance.   - Return to GI office in 8 weeks.     To assist you in voiding:  Drink plenty of fluids  Listen to running water while attempting to void.    If you are unable to urinate and you have an uncomfortable urge to void or it has been   6 hours since you were discharged, return to the Emergency Room     No pushing, pulling, tugging,  heavy lifting, or strenuous activity.  No major decision making, driving, or drinking alcoholic beverages for 24 hours. ( due to the medications you have  received)  Always use good hand hygiene/washing techniques.  NO driving while taking pain medications.

## 2022-08-19 LAB — REF LAB TEST METHOD: NORMAL

## 2022-09-29 ENCOUNTER — OFFICE VISIT (OUTPATIENT)
Dept: FAMILY MEDICINE CLINIC | Facility: CLINIC | Age: 77
End: 2022-09-29

## 2022-09-29 VITALS
DIASTOLIC BLOOD PRESSURE: 70 MMHG | HEART RATE: 85 BPM | RESPIRATION RATE: 14 BRPM | HEIGHT: 68 IN | SYSTOLIC BLOOD PRESSURE: 132 MMHG | BODY MASS INDEX: 21.22 KG/M2 | OXYGEN SATURATION: 100 % | WEIGHT: 140 LBS | TEMPERATURE: 98.6 F

## 2022-09-29 DIAGNOSIS — Z00.00 HEALTHCARE MAINTENANCE: ICD-10-CM

## 2022-09-29 DIAGNOSIS — Z86.010 HISTORY OF COLONIC POLYPS: ICD-10-CM

## 2022-09-29 DIAGNOSIS — K21.9 GASTROESOPHAGEAL REFLUX DISEASE WITHOUT ESOPHAGITIS: ICD-10-CM

## 2022-09-29 DIAGNOSIS — Z85.828 HISTORY OF NONMELANOMA SKIN CANCER: ICD-10-CM

## 2022-09-29 DIAGNOSIS — E78.2 MIXED HYPERLIPIDEMIA: ICD-10-CM

## 2022-09-29 DIAGNOSIS — N40.1 BENIGN PROSTATIC HYPERPLASIA WITH URINARY FREQUENCY: ICD-10-CM

## 2022-09-29 DIAGNOSIS — I10 ESSENTIAL HYPERTENSION: ICD-10-CM

## 2022-09-29 DIAGNOSIS — F17.200 SMOKER: ICD-10-CM

## 2022-09-29 DIAGNOSIS — E11.9 TYPE 2 DIABETES MELLITUS WITHOUT COMPLICATION, WITHOUT LONG-TERM CURRENT USE OF INSULIN: ICD-10-CM

## 2022-09-29 DIAGNOSIS — M54.59 MECHANICAL LOW BACK PAIN: ICD-10-CM

## 2022-09-29 DIAGNOSIS — I25.10 CORONARY ARTERY CALCIFICATION SEEN ON CT SCAN: ICD-10-CM

## 2022-09-29 DIAGNOSIS — G25.81 RLS (RESTLESS LEGS SYNDROME): ICD-10-CM

## 2022-09-29 DIAGNOSIS — J44.9 MIXED TYPE COPD (CHRONIC OBSTRUCTIVE PULMONARY DISEASE): ICD-10-CM

## 2022-09-29 DIAGNOSIS — M85.89 OSTEOPENIA OF MULTIPLE SITES: ICD-10-CM

## 2022-09-29 DIAGNOSIS — I70.0 ATHEROSCLEROSIS OF AORTA: Primary | ICD-10-CM

## 2022-09-29 DIAGNOSIS — Z23 ENCOUNTER FOR IMMUNIZATION: ICD-10-CM

## 2022-09-29 DIAGNOSIS — F41.8 ANXIETY ASSOCIATED WITH DEPRESSION: ICD-10-CM

## 2022-09-29 DIAGNOSIS — R35.0 BENIGN PROSTATIC HYPERPLASIA WITH URINARY FREQUENCY: ICD-10-CM

## 2022-09-29 PROBLEM — R33.9 URINARY RETENTION: Status: RESOLVED | Noted: 2022-05-19 | Resolved: 2022-09-29

## 2022-09-29 PROBLEM — R39.9 LOWER URINARY TRACT SYMPTOMS (LUTS): Status: RESOLVED | Noted: 2022-05-18 | Resolved: 2022-09-29

## 2022-09-29 PROCEDURE — 99214 OFFICE O/P EST MOD 30 MIN: CPT | Performed by: GENERAL PRACTICE

## 2022-09-29 PROCEDURE — 90686 IIV4 VACC NO PRSV 0.5 ML IM: CPT | Performed by: GENERAL PRACTICE

## 2022-09-29 PROCEDURE — G0008 ADMIN INFLUENZA VIRUS VAC: HCPCS | Performed by: GENERAL PRACTICE

## 2022-09-29 RX ORDER — ASPIRIN 81 MG/1
81 TABLET ORAL DAILY
Qty: 90 TABLET | Refills: 3 | Status: SHIPPED | OUTPATIENT
Start: 2022-09-29 | End: 2023-01-16 | Stop reason: SDUPTHER

## 2022-09-29 RX ORDER — MONTELUKAST SODIUM 10 MG/1
10 TABLET ORAL DAILY
Qty: 90 TABLET | Refills: 3 | Status: SHIPPED | OUTPATIENT
Start: 2022-09-29 | End: 2022-12-12

## 2022-09-29 NOTE — PROGRESS NOTES
Subjective   Torin Granados is a 76 y.o. male.     Chief Complaint  He returns for a scheduled reassessment of multiple medical problems including type 2 diabetes mellitus, hyperlipidemia, essential hypertension, BPH, colonic polyps, and restless leg syndrome    History of Present Illness     Type 2 Diabetes Mellitus  He admits to numbness and tingling of the feet.  He continues to deny any visual disturbances, polydipsia, polyuria, hypoglycemia or foot ulcerations. Evaluation to date has been: hemoglobin A1C. Home sugars: have remained at goal. Current treatments: metformin and GLP agonist -liraglutide and basal insulin (together as xultophy -16 units daily). He underwent an updated diabetic eye exam on 10/18/2021  Lab Results   Component Value Date    HGBA1C 7.20 (H) 06/16/2022     Lab Results   Component Value Date    MICROALBUR 52.8 06/16/2022     Lab Results   Component Value Date    JWQFPDYK16 1,508 (H) 06/16/2022     Hyperlipidemia  His compliance with treatment remains quite good. He is taking simvastatin and ezetimibe with no apparent side effects  Lab Results   Component Value Date    CHOL 143 05/07/2021    CHLPL 113 06/16/2022    TRIG 66 06/16/2022    HDL 55 06/16/2022    LDL 44 06/16/2022     Essential Hypertension  Home blood pressure readings: not doing. He continues to deny any chest pain, palpitations, dyspnea, orthopnea, paroxysmal nocturnal dyspnea or peripheral edema. Current antihypertensive medications includes lisinopril.  Lab Results   Component Value Date    GLUCOSE 64 (L) 06/16/2022    BUN 11 06/16/2022    CREATININE 0.73 (L) 06/16/2022    EGFRIFNONA 103 12/14/2021    EGFRIFAFRI 125 12/14/2021    BCR 15.1 06/16/2022    K 4.6 06/16/2022    CO2 18.6 (L) 06/16/2022    CALCIUM 8.9 06/16/2022    PROTENTOTREF 6.4 06/16/2022    ALBUMIN 4.40 06/16/2022    LABIL2 2.2 06/16/2022    AST 24 06/16/2022    ALT 22 06/16/2022     Lab Results   Component Value Date    ALKPHOS 66 06/16/2022    ALKPHOS 64  05/07/2021     BPH  He underwent a TURP greenlight by Dr. Barraza on 6/2/2022.  His course was uncomplicated and his bladder catheter removed 4 days afterward.  He continues to have mild urgency and nocturia x 2, but has noted an improvement in his stream and sense of incomplete voiding.  He experienced dysuria initially but this has resolved and he denies any hematuria, fever, or chills.  He is not scheduled to undergo a urology reassessment at present.  He has been taken off both finasteride and tamsulosin    Colonic Polyps  He underwent an updated colonoscopy on 8/17/2022 with removal of 7 tubular adenomatous polyps.  He has been advised to have his neck study in 3 years    Restless Leg Syndrome  He has a long history of intermittent lower extremity discomfort.  He describes this as a tight ache centered about his knees.  Episodes occur primarily at night.  He admits to occasional knee stiffness but denies any swelling.  He remains on ropinirole 0.5-1 nightly with a dramatic improvement in his symptoms and no apparent side effects thus far    Labs  Most recent vitamin D 45.9  Lab Results   Component Value Date    TSH 1.530 06/16/2022     The following portions of the patient's history were reviewed and updated as appropriate: allergies, current medications, past medical history, past social history and problem list.    Review of Systems   Constitutional: Negative for appetite change, chills, fatigue, fever and unexpected weight change.   HENT: Positive for rhinorrhea and sneezing. Negative for congestion, ear pain, postnasal drip, sore throat and voice change.    Eyes: Negative for visual disturbance.   Respiratory: Negative for cough, shortness of breath and wheezing.    Cardiovascular: Negative for chest pain, palpitations and leg swelling.   Gastrointestinal: Negative for abdominal pain, blood in stool, constipation, diarrhea, nausea and vomiting.   Endocrine: Negative for polydipsia and polyuria.    Genitourinary: Positive for urgency. Negative for difficulty urinating, dysuria, frequency and hematuria.        Nocturia x 2   Musculoskeletal: Positive for arthralgias and back pain. Negative for myalgias.   Skin: Negative for rash.   Neurological: Positive for numbness. Negative for tremors, weakness and headaches.   Psychiatric/Behavioral: Negative for dysphoric mood and sleep disturbance. The patient is not nervous/anxious.      Objective   Physical Exam  Constitutional:       General: He is not in acute distress.     Appearance: Normal appearance. He is well-developed. He is not ill-appearing or diaphoretic.      Comments: Bright and in good spirits. No apparent distress. No pallor, jaundice, diaphoresis, or cyanosis.   HENT:      Head: Atraumatic.      Right Ear: Tympanic membrane, ear canal and external ear normal.      Left Ear: Tympanic membrane, ear canal and external ear normal.   Eyes:      Conjunctiva/sclera: Conjunctivae normal.   Neck:      Thyroid: No thyroid mass or thyromegaly.      Vascular: No carotid bruit or JVD.      Trachea: Trachea normal. No tracheal deviation.   Cardiovascular:      Rate and Rhythm: Normal rate and regular rhythm.      Heart sounds: Normal heart sounds, S1 normal and S2 normal. No murmur heard.    No gallop. No S3 or S4 sounds.   Pulmonary:      Effort: Pulmonary effort is normal.      Breath sounds: Wheezing (diffuse - mild) present.      Comments: Pulmonary hyperinflation  Chest:   Breasts:      Right: No supraclavicular adenopathy.      Left: No supraclavicular adenopathy.       Abdominal:      General: Bowel sounds are normal. There is no distension.   Musculoskeletal:      Right lower leg: No edema.      Left lower leg: No edema.      Comments: No peripheral joint redness or warmth.   Lymphadenopathy:      Head:      Right side of head: No submental, submandibular, tonsillar, preauricular, posterior auricular or occipital adenopathy.      Left side of head: No  submental, submandibular, tonsillar, preauricular, posterior auricular or occipital adenopathy.      Cervical: No cervical adenopathy.      Upper Body:      Right upper body: No supraclavicular adenopathy.      Left upper body: No supraclavicular adenopathy.   Skin:     General: Skin is warm and dry.      Coloration: Skin is not cyanotic, jaundiced or pale.      Findings: No lesion or rash.      Nails: There is no clubbing.   Neurological:      Mental Status: He is alert and oriented to person, place, and time.      Cranial Nerves: No cranial nerve deficit.      Sensory: Sensory deficit (decreased vibration sense toes of both feet) present.      Motor: No tremor.      Coordination: Coordination normal.      Gait: Gait normal.   Psychiatric:         Attention and Perception: Attention normal.         Mood and Affect: Mood normal.         Speech: Speech normal.         Behavior: Behavior normal.         Thought Content: Thought content normal.       Assessment & Plan   Problems Addressed this Visit        Cardiac and Vasculature    Atherosclerosis of aorta ([facility])   Reminded regarding risk factor modification with an emphasis on tobacco cessation.  Continue low-dose ASA    Coronary artery calcification seen on CT scan  As above.    Essential hypertension   Hypertension: at goal. Evidence of target organ damage: atherosclerosis of the aorta and coronary artery calcifications on previous imaging.  Encouraged to continue to work on diet and exercise plan.   Continue current medication    Mixed hyperlipidemia  As above.   Continue current medication.       Endocrine and Metabolic    Type 2 diabetes mellitus without complication, without long-term current use of insulin ([facility])  Diabetes mellitus Type II, under excellent control.   Encouraged to continue to pursue ADA diet  Encouraged aerobic exercise.  Continue current medication  Updated labs will be drawn at his return.       Gastrointestinal Abdominal     Gastroesophageal  reflux disease without esophagitis    History of colonic polyps  Will be due for his next study in mid 2025       Genitourinary and Reproductive     Benign prostatic hyperplasia with urinary frequency  Continues to do well  Encouraged to report if this should change.       Health Encounters    Healthcare maintenance  Flu shot administered.  Recommended an updated bivalent COVID-19 vaccination.    Relevant Orders    FluLaval/Fluarix/Fluzone >6 Months (Completed)       Hematology and Neoplasia    History of nonmelanoma skin cancer       Mental Health    Anxiety associated with depression       Musculoskeletal and Injuries    Mechanical low back pain    Osteopenia of multiple sites       Neuro    RLS (restless legs syndrome)  Reminded regarding symptomatic treatment.   Continue current medication  Encouraged to report if any worse or if any new symptoms or concerns.       Pulmonary and Pneumonias    Mixed type COPD (chronic obstructive pulmonary disease) (HCC)   COPD is stable.  Reminded of the importance of smoking cessation  Encouraged to remain as active as symptoms allow for  Continue current medication    Relevant Medications    montelukast (SINGULAIR) 10 MG tablet       Tobacco    Smoker       Other    Encounter for immunization    Relevant Orders    FluLaval/Fluarix/Fluzone >6 Months (Completed)      Diagnoses       Codes Comments    Atherosclerosis of aorta (HCC)    -  Primary ICD-10-CM: I70.0  ICD-9-CM: 440.0     Coronary artery calcification seen on CT scan     ICD-10-CM: I25.10  ICD-9-CM: 414.00     Essential hypertension     ICD-10-CM: I10  ICD-9-CM: 401.9     Mixed hyperlipidemia     ICD-10-CM: E78.2  ICD-9-CM: 272.2     Type 2 diabetes mellitus without complication, without long-term current use of insulin (HCC)     ICD-10-CM: E11.9  ICD-9-CM: 250.00     Gastroesophageal reflux disease without esophagitis     ICD-10-CM: K21.9  ICD-9-CM: 530.81     History of colonic polyps     ICD-10-CM:  Z86.010  ICD-9-CM: V12.72     Benign prostatic hyperplasia with urinary frequency     ICD-10-CM: N40.1, R35.0  ICD-9-CM: 600.01, 788.41     Healthcare maintenance     ICD-10-CM: Z00.00  ICD-9-CM: V70.0     History of nonmelanoma skin cancer     ICD-10-CM: Z85.828  ICD-9-CM: V10.83     Anxiety associated with depression     ICD-10-CM: F41.8  ICD-9-CM: 300.4     Mechanical low back pain     ICD-10-CM: M54.59  ICD-9-CM: 724.2     Osteopenia of multiple sites     ICD-10-CM: M85.89  ICD-9-CM: 733.90     RLS (restless legs syndrome)     ICD-10-CM: G25.81  ICD-9-CM: 333.94     Mixed type COPD (chronic obstructive pulmonary disease) (HCC)     ICD-10-CM: J44.9  ICD-9-CM: 496     Smoker     ICD-10-CM: F17.200  ICD-9-CM: 305.1     Encounter for immunization     ICD-10-CM: Z23  ICD-9-CM: V03.89

## 2022-10-25 ENCOUNTER — OFFICE VISIT (OUTPATIENT)
Dept: FAMILY MEDICINE CLINIC | Facility: CLINIC | Age: 77
End: 2022-10-25

## 2022-10-25 VITALS
BODY MASS INDEX: 21.25 KG/M2 | HEART RATE: 68 BPM | TEMPERATURE: 97.2 F | WEIGHT: 140.2 LBS | DIASTOLIC BLOOD PRESSURE: 74 MMHG | OXYGEN SATURATION: 99 % | RESPIRATION RATE: 20 BRPM | HEIGHT: 68 IN | SYSTOLIC BLOOD PRESSURE: 120 MMHG

## 2022-10-25 DIAGNOSIS — R04.0 EPISTAXIS: Primary | ICD-10-CM

## 2022-10-25 PROCEDURE — 99213 OFFICE O/P EST LOW 20 MIN: CPT | Performed by: PHYSICIAN ASSISTANT

## 2022-10-25 NOTE — PROGRESS NOTES
Subjective        Chief Complaint  Nose Bleed    Subjective      History of Present Illness  Torin Granados is a 77 y.o. male who presents today to [facility] for Nose Bleed. Past medical history is significant for HTN, HLD, type 2 diabetes mellitus, GERD, BPH, anxiety, depression, restless leg syndrome, chronic low back pain, COPD, tobacco use.    Nose Bleed:  He reports intermittent bleeding from the left nostril since Friday. Prior to this, he had used a de-humidifier in his home for several nights in a row.  No prior history of epistaxis and no other bleeding reported.  He takes a baby aspirin daily.    Current Outpatient Medications:   •  aspirin 81 MG EC tablet, Take 1 tablet by mouth Daily., Disp: 90 tablet, Rfl: 3  •  BD Pen Needle Courtney U/F 32G X 4 MM misc, USE TO INJECT INSULIN ONCE DAILY, Disp: 90 each, Rfl: 3  •  Blood Glucose Monitoring Suppl (Accu-Chek Ana Rosa Plus) w/Device kit, USE TO TEST BLOOD GLUCOSE, Disp: 1 kit, Rfl: 0  •  DULoxetine (CYMBALTA) 60 MG capsule, TAKE 2 CAPSULES BY MOUTH ONCE DAILY, Disp: 180 capsule, Rfl: 3  •  ezetimibe (ZETIA) 10 MG tablet, TAKE ONE TABLET BY MOUTH EVERY DAY, Disp: 90 tablet, Rfl: 3  •  glucose blood (Accu-Chek Ana Rosa Plus) test strip, USE TO TEST BLOOD GLUCOSE THREE TIMES A DAY, Disp: 200 each, Rfl: 5  •  Insulin Degludec-Liraglutide (Xultophy) 100-3.6 UNIT-MG/ML solution pen-injector subcutaneous pen, Inject 16 Units under the skin into the appropriate area as directed Daily., Disp: 15 mL, Rfl: 3  •  Lancets misc, 1 three times daily, Disp: 90 each, Rfl: 5  •  lansoprazole (PREVACID) 30 MG capsule, Take 1 capsule by mouth Daily., Disp: 90 capsule, Rfl: 3  •  lisinopril (PRINIVIL,ZESTRIL) 10 MG tablet, TAKE ONE TABLET BY MOUTH EVERY DAY, Disp: 90 tablet, Rfl: 3  •  metFORMIN (GLUCOPHAGE) 1000 MG tablet, TAKE ONE TABLET BY MOUTH TWO TIMES A DAY WITH MEALS, Disp: 180 tablet, Rfl: 3  •  montelukast (SINGULAIR) 10 MG tablet, Take 1  "tablet by mouth Daily., Disp: 90 tablet, Rfl: 3  •  multivitamin with minerals tablet tablet, Take 1 tablet by mouth Daily., Disp: , Rfl:   •  rOPINIRole (REQUIP) 0.5 MG tablet, TAKE 2 TABLETS BY MOUTH EVERY NIGHT., Disp: 180 tablet, Rfl: 3  •  simvastatin (ZOCOR) 20 MG tablet, TAKE 1 TABLET BY MOUTH EVERY NIGHT., Disp: 90 tablet, Rfl: 3  •  phenylephrine (GAETANO-SYNEPHRINE) 1 % nasal spray, 1 spray into the nostril(s) as directed by provider Every 4 (Four) Hours As Needed for Congestion., Disp: 1 each, Rfl: 0      No Known Allergies    Objective     Objective   Vital Signs:  Blood Pressure 120/74 (BP Location: Left arm, Patient Position: Sitting, Cuff Size: Adult)   Pulse 68   Temperature 97.2 °F (36.2 °C) (Temporal)   Respiration 20   Height 172.7 cm (68\")   Weight 63.6 kg (140 lb 3.2 oz)   Oxygen Saturation 99%   Body Mass Index 21.32 kg/m²   Estimated body mass index is 21.32 kg/m² as calculated from the following:    Height as of this encounter: 172.7 cm (68\").    Weight as of this encounter: 63.6 kg (140 lb 3.2 oz).    BMI is within normal parameters. No other follow-up for BMI required.    Past Medical History:   Diagnosis Date   • Alcoholism (HCC) 2020   • Anxiety associated with depression    • Arthritis    • Basal cell carcinoma (BCC) of skin of face    • Benign prostatic hyperplasia    • Colon polyp    • COPD (chronic obstructive pulmonary disease) (HCC)    • Coronary artery calcification seen on CT scan 06/08/2017   • Depression    • Diabetes mellitus (HCC)    • Enlarged prostate    • Hyperlipidemia    • Hypertension    • Ulcer      Past Surgical History:   Procedure Laterality Date   • COLONOSCOPY      over 5years ago   • COLONOSCOPY N/A 03/29/2017    Procedure: Colonoscopy  CPTCODE: 42478;  Surgeon: Rudy Velez III, MD;  Location: [facility];  Service:    • COLONOSCOPY N/A 8/17/2022    Procedure: COLONOSCOPY with polypectomy x 8;  Surgeon: Abad Agudelo MD;  [facility]" "ENDOSCOPY;  Service: Gastroenterology;  Laterality: N/A;   • CYSTOSCOPY TRANSURETHRAL RESECTION OF PROSTATE N/A 6/2/2022    Procedure: CYSTOSCOPY TRANSURETHRAL RESECTION OF PROSTATE GREENLIGHT;  Surgeon: Greg Barraza MD;  Location: [facility];  Service: Urology;  Laterality: N/A;   • KNEE SURGERY Left     \"3-4 surgeries on left knee\" per pt   • SKIN BIOPSY      melanoma on face   • VASECTOMY  1970     Social History     Socioeconomic History   • Marital status:      Spouse name: emmie   • Number of children: 1   • Years of education: 20   Tobacco Use   • Smoking status: Every Day     Packs/day: 0.50     Years: 20.00     Pack years: 10.00     Types: Cigarettes   • Smokeless tobacco: Current     Types: Snuff   Vaping Use   • Vaping Use: Never used   Substance and Sexual Activity   • Alcohol use: Not Currently     Comment: recoveringn alcoholic, no drink in 25 yrs   • Drug use: Never   • Sexual activity: Not Currently     Partners: Female     Birth control/protection: Post-menopausal      Physical Exam  Vitals and nursing note reviewed.   Constitutional:       General: He is not in acute distress.     Appearance: He is well-developed. He is not diaphoretic.   HENT:      Head: Normocephalic and atraumatic.   Eyes:      General: No scleral icterus.        Right eye: No discharge.         Left eye: No discharge.      Conjunctiva/sclera: Conjunctivae normal.   Cardiovascular:      Rate and Rhythm: Normal rate and regular rhythm.      Heart sounds: Normal heart sounds. No murmur heard.    No friction rub. No gallop.   Pulmonary:      Effort: Pulmonary effort is normal. No respiratory distress.      Breath sounds: Normal breath sounds. No wheezing or rales.   Chest:      Chest wall: No tenderness.   Musculoskeletal:         General: Normal range of motion.      Cervical back: Normal range of motion and neck supple.   Skin:     General: Skin is warm and dry.      Coloration: Skin is not pale.      Findings: No " erythema or rash.   Neurological:      Mental Status: He is alert and oriented to person, place, and time.   Psychiatric:         Behavior: Behavior normal.       Assessment / Plan         Assessment   Diagnoses and all orders for this visit:    1. Epistaxis (Primary)  • Epistaxis and on soft side a cottonball in his left nostril which has been in place since 7:30 AM this morning.  • Since the bleeding has currently stopped, this was not removed.  Patient was provided with a sample of phenylephrine nasal spray.  He was instructed to take the packing out a couple hours before bed this evening and if the bleeding restarts to use 3 sprays of the nasal spray in the left nostril.  He is to clamp his nose and lean forward for 5 to 10 minutes at minimum, repeat the steps above.  • If continued bleeding, he is to come back tomorrow and will consider silver nitrate to the affected area if it is anterior and can be seen.  He expressed understanding and agreed.    Health Maintenance          Follow Up   Return if symptoms worsen or fail to improve.    Patient was given instructions and counseling regarding his condition or for health maintenance advice. Please see specific information pulled into the AVS if appropriate.       This document has been electronically signed by MICHAEL Anguiano   October 29, 2022 09:36 EDT    Dictated Utilizing Dragon Dictation: Part of this note may be an electronic transcription/translation of spoken language to printed text using the Dragon Dictation System.

## 2022-11-12 ENCOUNTER — READMISSION MANAGEMENT (OUTPATIENT)
Dept: CALL CENTER | Facility: HOSPITAL | Age: 77
End: 2022-11-12

## 2022-11-12 NOTE — OUTREACH NOTE
Prep Survey    Flowsheet Row Responses   [facility]? Non-[facility]   Is LACE score < 7 ? Non-BH Discharge   Emergency Room discharge w/ pulse ox? No   Eligibility TCM [facility] -   Date of Discharge 11/11/22   Discharge Disposition Home or Self Care   Discharge diagnosis Occlusion and stenosis of unspecified carotid artery   Does the patient have one of the following disease processes/diagnoses(primary or secondary)? Other   Prep survey completed? Yes          ADALBERTO RODAS - Registered Nurse

## 2022-11-14 ENCOUNTER — TRANSITIONAL CARE MANAGEMENT TELEPHONE ENCOUNTER (OUTPATIENT)
Dept: CALL CENTER | Facility: HOSPITAL | Age: 77
End: 2022-11-14

## 2022-11-14 NOTE — OUTREACH NOTE
Call Center TCM Note    Flowsheet Row Responses   [facility] patient discharged from? Non-BH   Does the patient have one of the following disease processes/diagnoses(primary or secondary)? Other   TCM attempt successful? No   Unsuccessful attempts Attempt 2          Ly Cuadra RN    11/14/2022, 14:17 EST

## 2022-11-14 NOTE — OUTREACH NOTE
Call Center TCM Note    Flowsheet Row Responses   [facility] patient discharged from? Non-BH   Does the patient have one of the following disease processes/diagnoses(primary or secondary)? Other   TCM attempt successful? No   Unsuccessful attempts Attempt 1          Ly Cuadra RN    11/14/2022, 09:10 EST

## 2022-11-15 ENCOUNTER — TRANSITIONAL CARE MANAGEMENT TELEPHONE ENCOUNTER (OUTPATIENT)
Dept: CALL CENTER | Facility: HOSPITAL | Age: 77
End: 2022-11-15

## 2022-11-15 NOTE — OUTREACH NOTE
Call Center TCM Note    Flowsheet Row Responses   [facility] patient discharged from? Non-BH   Does the patient have one of the following disease processes/diagnoses(primary or secondary)? Other   TCM attempt successful? No   Unsuccessful attempts Attempt 3  [No updated verbal release on file from PCP group ]          Yanira Michael RN    11/15/2022, 08:43 EST

## 2022-11-18 DIAGNOSIS — M79.605 PAIN IN BOTH LOWER EXTREMITIES: ICD-10-CM

## 2022-11-18 DIAGNOSIS — M79.604 PAIN IN BOTH LOWER EXTREMITIES: ICD-10-CM

## 2022-11-18 RX ORDER — ROPINIROLE 2 MG/1
2 TABLET, FILM COATED ORAL NIGHTLY
Qty: 30 TABLET | Refills: 0 | Status: SHIPPED | OUTPATIENT
Start: 2022-11-18 | End: 2022-12-05 | Stop reason: SDUPTHER

## 2022-11-18 RX ORDER — LEVETIRACETAM 1000 MG/1
1000 TABLET ORAL 2 TIMES DAILY
Qty: 60 TABLET | Refills: 0 | Status: SHIPPED | OUTPATIENT
Start: 2022-11-18 | End: 2022-12-28

## 2022-11-18 RX ORDER — PANTOPRAZOLE SODIUM 40 MG/1
40 TABLET, DELAYED RELEASE ORAL DAILY
Qty: 30 TABLET | Refills: 0 | Status: SHIPPED | OUTPATIENT
Start: 2022-11-18 | End: 2022-12-12

## 2022-11-18 RX ORDER — LANOLIN ALCOHOL/MO/W.PET/CERES
325 CREAM (GRAM) TOPICAL
Qty: 30 TABLET | Refills: 0 | Status: SHIPPED | OUTPATIENT
Start: 2022-11-18 | End: 2023-01-11

## 2022-11-18 RX ORDER — AMLODIPINE BESYLATE 10 MG/1
10 TABLET ORAL DAILY
Qty: 30 TABLET | Refills: 0 | Status: SHIPPED | OUTPATIENT
Start: 2022-11-18 | End: 2022-12-28

## 2022-11-18 RX ORDER — ATORVASTATIN CALCIUM 80 MG/1
80 TABLET, FILM COATED ORAL NIGHTLY
Qty: 30 TABLET | Refills: 0 | Status: SHIPPED | OUTPATIENT
Start: 2022-11-18 | End: 2022-12-28

## 2022-11-18 RX ORDER — HYDRALAZINE HYDROCHLORIDE 25 MG/1
25 TABLET, FILM COATED ORAL 2 TIMES DAILY
Qty: 60 TABLET | Refills: 0 | Status: SHIPPED | OUTPATIENT
Start: 2022-11-18 | End: 2022-12-28

## 2022-11-18 RX ORDER — CHLORTHALIDONE 25 MG/1
25 TABLET ORAL EVERY MORNING
Qty: 30 TABLET | Refills: 0 | Status: SHIPPED | OUTPATIENT
Start: 2022-11-18 | End: 2022-12-28

## 2022-11-18 RX ORDER — CLOPIDOGREL BISULFATE 75 MG/1
75 TABLET ORAL DAILY
Qty: 30 TABLET | Refills: 0 | Status: SHIPPED | OUTPATIENT
Start: 2022-11-18 | End: 2023-01-16 | Stop reason: SDUPTHER

## 2022-11-18 RX ORDER — ROPINIROLE 0.5 MG/1
1 TABLET, FILM COATED ORAL NIGHTLY
Qty: 180 TABLET | Refills: 3 | Status: SHIPPED | OUTPATIENT
Start: 2022-11-18 | End: 2022-11-18 | Stop reason: SDUPTHER

## 2022-11-19 DIAGNOSIS — F17.200 SMOKER: Primary | ICD-10-CM

## 2022-11-19 RX ORDER — NICOTINE 21 MG/24HR
1 PATCH, TRANSDERMAL 24 HOURS TRANSDERMAL EVERY 24 HOURS
Qty: 28 PATCH | Refills: 0 | Status: SHIPPED | OUTPATIENT
Start: 2022-11-19 | End: 2023-02-24

## 2022-11-30 ENCOUNTER — TELEPHONE (OUTPATIENT)
Dept: FAMILY MEDICINE CLINIC | Facility: CLINIC | Age: 77
End: 2022-11-30

## 2022-11-30 RX ORDER — DULOXETIN HYDROCHLORIDE 60 MG/1
120 CAPSULE, DELAYED RELEASE ORAL DAILY
Qty: 180 CAPSULE | Refills: 3 | Status: SHIPPED | OUTPATIENT
Start: 2022-11-30 | End: 2023-01-16 | Stop reason: SDUPTHER

## 2022-11-30 NOTE — TELEPHONE ENCOUNTER
Caller: Torin Granados    Relationship: Self    Best call back number: [phone]    Requested Prescriptions:   Requested Prescriptions      No prescriptions requested or ordered in this encounter        Pharmacy where request should be sent: [place], [place] - [address] [place] [address]. - [phone]  - [phone] FX     Additional details provided by patient:     HOSPITAL CHANGED LISINOPRIL FROM 10 MG TO 40 MG.  PLEASE CALL IN 40 MG     Does the patient have less than a 3 day supply:  [] Yes  [x] No    Would you like a call back once the refill request has been completed: [x] Yes [] No    If the office needs to give you a call back, can they leave a voicemail: [x] Yes [] No    David Flores Rep   11/30/22 16:32 EST

## 2022-12-01 DIAGNOSIS — I10 ESSENTIAL HYPERTENSION: ICD-10-CM

## 2022-12-01 RX ORDER — LISINOPRIL 40 MG/1
40 TABLET ORAL DAILY
Qty: 90 TABLET | Refills: 3 | Status: SHIPPED | OUTPATIENT
Start: 2022-12-01 | End: 2023-01-16 | Stop reason: SDUPTHER

## 2022-12-02 ENCOUNTER — TELEPHONE (OUTPATIENT)
Dept: FAMILY MEDICINE CLINIC | Facility: CLINIC | Age: 77
End: 2022-12-02

## 2022-12-02 NOTE — TELEPHONE ENCOUNTER
Caller: [place] [id number] - [phone]  - [phone] FX    Relationship: Pharmacy      What medications are you currently taking:   Current Outpatient Medications on File Prior to Visit   Medication Sig Dispense Refill   • lisinopril (PRINIVIL,ZESTRIL) 40 MG tablet Take 1 tablet by mouth Daily. 90 tablet 3   • amLODIPine (NORVASC) 10 MG tablet Take 1 tablet by mouth Daily. 30 tablet 0   • aspirin 81 MG EC tablet Take 1 tablet by mouth Daily. 90 tablet 3   • atorvastatin (LIPITOR) 80 MG tablet Take 1 tablet by mouth Every Night. 30 tablet 0   • BD Pen Needle Courtney U/F 32G X 4 MM misc USE TO INJECT INSULIN ONCE DAILY 90 each 3   • Blood Glucose Monitoring Suppl (Accu-Chek Ana Rosa Plus) w/Device kit USE TO TEST BLOOD GLUCOSE 1 kit 0   • chlorthalidone (HYGROTON) 25 MG tablet Take 1 tablet by mouth Every Morning. 30 tablet 0   • clopidogrel (PLAVIX) 75 MG tablet Take 1 tablet by mouth Daily. 30 tablet 0   • DULoxetine (CYMBALTA) 60 MG capsule Take 2 capsules by mouth Daily. 180 capsule 3   • ezetimibe (ZETIA) 10 MG tablet TAKE ONE TABLET BY MOUTH EVERY DAY 90 tablet 3   • ferrous sulfate 325 (65 FE) MG EC tablet Take 1 tablet by mouth Daily With Breakfast. 30 tablet 0   • glucose blood (Accu-Chek Ana Rosa Plus) test strip USE TO TEST BLOOD GLUCOSE THREE TIMES A  each 5   • hydrALAZINE (APRESOLINE) 25 MG tablet Take 1 tablet by mouth 2 (Two) Times a Day. 60 tablet 0   • Insulin Degludec-Liraglutide (Xultophy) 100-3.6 UNIT-MG/ML solution pen-injector subcutaneous pen Inject 16 Units under the skin into the appropriate area as directed Daily. 15 mL 3   • Lancets misc 1 three times daily 90 each 5   • levETIRAcetam (KEPPRA) 1000 MG tablet Take 1 tablet by mouth 2 (Two) Times a Day. 60 tablet 0   • montelukast (SINGULAIR) 10 MG tablet Take 1 tablet by mouth Daily. 90 tablet 3   • multivitamin with minerals tablet tablet Take 1 tablet by mouth Daily.     • nicotine  (NICODERM CQ) 21 MG/24HR patch Place 1 patch on the skin as directed by provider Daily. 28 patch 0   • pantoprazole (PROTONIX) 40 MG EC tablet Take 1 tablet by mouth Daily. 30 tablet 0   • phenylephrine (GAETANO-SYNEPHRINE) 1 % nasal spray 1 spray into the nostril(s) as directed by provider Every 4 (Four) Hours As Needed for Congestion. 1 each 0   • rOPINIRole (REQUIP) 2 MG tablet Take 1 tablet by mouth Every Night. 30 tablet 0     No current facility-administered medications on file prior to visit.      What are your concerns: CALLED TO CLARIFY THE DOSAGE FOR RX REQUIP BECAUSE THEY RECEIVED TWO DIFFERENT DOSAGES.

## 2022-12-05 DIAGNOSIS — M79.605 PAIN IN BOTH LOWER EXTREMITIES: ICD-10-CM

## 2022-12-05 DIAGNOSIS — M79.604 PAIN IN BOTH LOWER EXTREMITIES: ICD-10-CM

## 2022-12-05 RX ORDER — ROPINIROLE 2 MG/1
2 TABLET, FILM COATED ORAL NIGHTLY
Qty: 90 TABLET | Refills: 3 | Status: SHIPPED | OUTPATIENT
Start: 2022-12-05 | End: 2023-01-16 | Stop reason: SDUPTHER

## 2022-12-08 ENCOUNTER — TELEPHONE (OUTPATIENT)
Dept: FAMILY MEDICINE CLINIC | Facility: CLINIC | Age: 77
End: 2022-12-08

## 2022-12-08 DIAGNOSIS — E11.9 TYPE 2 DIABETES MELLITUS WITHOUT COMPLICATION, WITHOUT LONG-TERM CURRENT USE OF INSULIN: ICD-10-CM

## 2022-12-08 RX ORDER — LANCETS 30 GAUGE
EACH MISCELLANEOUS
Qty: 90 EACH | Refills: 5 | Status: SHIPPED | OUTPATIENT
Start: 2022-12-08 | End: 2023-01-16 | Stop reason: SDUPTHER

## 2022-12-08 RX ORDER — PEN NEEDLE, DIABETIC 32GX 5/32"
1 NEEDLE, DISPOSABLE MISCELLANEOUS DAILY
Qty: 90 EACH | Refills: 3 | Status: SHIPPED | OUTPATIENT
Start: 2022-12-08 | End: 2023-01-16 | Stop reason: SDUPTHER

## 2022-12-08 RX ORDER — BLOOD-GLUCOSE METER
1 EACH MISCELLANEOUS ONCE
Qty: 1 KIT | Refills: 0 | Status: SHIPPED | OUTPATIENT
Start: 2022-12-08 | End: 2022-12-08

## 2022-12-08 NOTE — TELEPHONE ENCOUNTER
Caller: Torin Granados    Relationship: Self    Best call back number: [phone]    Requested Prescriptions:   Requested Prescriptions     Pending Prescriptions Disp Refills   • Insulin Pen Needle (BD Pen Needle Courtney U/F) 32G X 4 MM misc 90 each 3     Si each by Other route Daily. use to inject insulin once daily   • Blood Glucose Monitoring Suppl (Accu-Chek Ana Rosa Plus) w/Device kit 1 kit 0   • glucose blood (Accu-Chek Ana Rosa Plus) test strip 200 each 5     Sig: USE TO TEST BLOOD GLUCOSE THREE TIMES A DAY   • Lancets misc 90 each 5     Si three times daily        Pharmacy where request should be sent: [address] - [phone] [facility] [phone] FX     Additional details provided by patient: PLEASE SEND REFILLS 90 DAY SUPPLY    Does the patient have less than a 3 day supply:  [x] Yes  [] No    Would you like a call back once the refill request has been completed: [x] Yes [] No    If the office needs to give you a call back, can they leave a voicemail: [x] Yes [] No    David Francois Rep   22 15:00 EST

## 2022-12-08 NOTE — TELEPHONE ENCOUNTER
Caller: Torin Granados    Relationship: Self    Best call back number: [phone] -OK TO LEAVE A DETAILED MESSAGE IF NO ANSWER    What medications are you currently taking:   Current Outpatient Medications on File Prior to Visit   Medication Sig Dispense Refill   • amLODIPine (NORVASC) 10 MG tablet Take 1 tablet by mouth Daily. 30 tablet 0   • aspirin 81 MG EC tablet Take 1 tablet by mouth Daily. 90 tablet 3   • atorvastatin (LIPITOR) 80 MG tablet Take 1 tablet by mouth Every Night. 30 tablet 0   • BD Pen Needle Courtney U/F 32G X 4 MM misc USE TO INJECT INSULIN ONCE DAILY 90 each 3   • Blood Glucose Monitoring Suppl (Accu-Chek Ana Rosa Plus) w/Device kit USE TO TEST BLOOD GLUCOSE 1 kit 0   • chlorthalidone (HYGROTON) 25 MG tablet Take 1 tablet by mouth Every Morning. 30 tablet 0   • clopidogrel (PLAVIX) 75 MG tablet Take 1 tablet by mouth Daily. 30 tablet 0   • DULoxetine (CYMBALTA) 60 MG capsule Take 2 capsules by mouth Daily. 180 capsule 3   • ezetimibe (ZETIA) 10 MG tablet TAKE ONE TABLET BY MOUTH EVERY DAY 90 tablet 3   • ferrous sulfate 325 (65 FE) MG EC tablet Take 1 tablet by mouth Daily With Breakfast. 30 tablet 0   • glucose blood (Accu-Chek Ana Rosa Plus) test strip USE TO TEST BLOOD GLUCOSE THREE TIMES A  each 5   • hydrALAZINE (APRESOLINE) 25 MG tablet Take 1 tablet by mouth 2 (Two) Times a Day. 60 tablet 0   • Insulin Degludec-Liraglutide (Xultophy) 100-3.6 UNIT-MG/ML solution pen-injector subcutaneous pen Inject 16 Units under the skin into the appropriate area as directed Daily. 15 mL 3   • Lancets misc 1 three times daily 90 each 5   • levETIRAcetam (KEPPRA) 1000 MG tablet Take 1 tablet by mouth 2 (Two) Times a Day. 60 tablet 0   • lisinopril (PRINIVIL,ZESTRIL) 40 MG tablet Take 1 tablet by mouth Daily. 90 tablet 3   • montelukast (SINGULAIR) 10 MG tablet Take 1 tablet by mouth Daily. 90 tablet 3   • multivitamin with minerals tablet tablet Take 1 tablet by mouth Daily.     • nicotine (NICODERM  CQ) 21 MG/24HR patch Place 1 patch on the skin as directed by provider Daily. 28 patch 0   • pantoprazole (PROTONIX) 40 MG EC tablet Take 1 tablet by mouth Daily. 30 tablet 0   • phenylephrine (GAETANO-SYNEPHRINE) 1 % nasal spray 1 spray into the nostril(s) as directed by provider Every 4 (Four) Hours As Needed for Congestion. 1 each 0   • rOPINIRole (REQUIP) 2 MG tablet Take 1 tablet by mouth Every Night. 90 tablet 3     No current facility-administered medications on file prior to visit.      When did you start taking these medications: SOME PRESCRIBED BY INTENSIVE CARE AND OTHER DOCTORS.    Which medication are you concerned about: ALL MEDICATIONS LISTED ABOVE HAVE A CONCERN. PLEASE SEE DETAILS TO FOLLOW.    Who prescribed you this medication: HEART DOCTOR, SURGEON, AND KIDNEY DOCTOR     What are your concerns:    ALL DOCTORS THAT THE PATIENT HAS SEEN TELLS HIM THAT HE NEEDS TO TALK TO DR BROWN ABOUT THE MEDICATION HE IS TAKING.   ON 12/7/22 THE SURGEON DISCUSSED THE BLOOD PRESSURE MEDICATION SHOULD HAVE THE STRENGTH REDUCED.   ONE OF THE MEDCIATIONS IS A DIEURETIC AND PATIENT DOES NOT KNOW WHY HE IS TAKING.   PATIENT ADVISED BY MANY DOCTORS TO STOP TAKING THE METFORMIN. PATIENT WANTS DR BROWN TO REVIEW MEDCIATIONS AND ADVISE WHAT HE SHOULD BE TAKING AND CONFIRM THE DOSES.     atorvastatin (LIPITOR) 80 MG tablet - ASKING IF THIS IS AN IRON PILL- ASKING IF SHE SHOULD TAKE A BLOOD TEST TO SEE IF THE IRON IS BACK TO NORMAL AND IF SO TO ASK ABOUT STOPPING THIS.    NEEDS THE NEEDLES  ezetimibe (ZETIA) 10 MG tablet MEDICATION WAS STOPPED.  lisinopril (PRINIVIL,ZESTRIL) 40 MG tablet  nicotine (NICODERM CQ) 21 MG/24HR patch WAS TOLD THIS DOESN'T DO ANY GOOD AND STOPED TAKING WHEN FOUND OUT THAT INSURANCE WOULD NOT COVER.      How long have you had these concerns: WHEN TALKING TO THE SURGEON ON 12/7/22      PATIENT ASKING FOR DR BROWN TO REVIEW ALL OF HIS MEDICATIONS AND ALERT PATIENT TO ANY THAT HE SHOULD STOP  TAKING AND IF ANY DOSE CHANGES.   PLEASE CALL PATIENT TO DISCUSS. NEXT APPOINTMENT 12/29/22 @ 2:30 PM.

## 2022-12-12 DIAGNOSIS — J44.9 MIXED TYPE COPD (CHRONIC OBSTRUCTIVE PULMONARY DISEASE): ICD-10-CM

## 2022-12-12 RX ORDER — MONTELUKAST SODIUM 10 MG/1
TABLET ORAL
Qty: 90 TABLET | Refills: 3 | Status: SHIPPED | OUTPATIENT
Start: 2022-12-12 | End: 2023-01-16 | Stop reason: SDUPTHER

## 2022-12-12 RX ORDER — LANSOPRAZOLE 30 MG/1
CAPSULE, DELAYED RELEASE ORAL
Qty: 90 CAPSULE | Refills: 3 | Status: SHIPPED | OUTPATIENT
Start: 2022-12-12 | End: 2023-01-16 | Stop reason: SDUPTHER

## 2022-12-22 DIAGNOSIS — K21.9 GASTROESOPHAGEAL REFLUX DISEASE WITHOUT ESOPHAGITIS: ICD-10-CM

## 2022-12-22 DIAGNOSIS — E11.9 TYPE 2 DIABETES MELLITUS WITHOUT COMPLICATION, WITHOUT LONG-TERM CURRENT USE OF INSULIN: ICD-10-CM

## 2022-12-22 DIAGNOSIS — M85.89 OSTEOPENIA OF MULTIPLE SITES: ICD-10-CM

## 2022-12-22 DIAGNOSIS — E78.2 MIXED HYPERLIPIDEMIA: ICD-10-CM

## 2022-12-22 DIAGNOSIS — I10 ESSENTIAL HYPERTENSION: Primary | ICD-10-CM

## 2022-12-28 RX ORDER — HYDRALAZINE HYDROCHLORIDE 25 MG/1
TABLET, FILM COATED ORAL
Qty: 60 TABLET | Refills: 0 | Status: SHIPPED | OUTPATIENT
Start: 2022-12-28 | End: 2023-01-11

## 2022-12-28 RX ORDER — CHLORTHALIDONE 25 MG/1
25 TABLET ORAL EVERY MORNING
Qty: 30 TABLET | Refills: 0 | Status: SHIPPED | OUTPATIENT
Start: 2022-12-28 | End: 2023-01-11

## 2022-12-28 RX ORDER — LEVETIRACETAM 1000 MG/1
TABLET ORAL
Qty: 60 TABLET | Refills: 0 | Status: SHIPPED | OUTPATIENT
Start: 2022-12-28 | End: 2023-01-16 | Stop reason: SDUPTHER

## 2022-12-28 RX ORDER — AMLODIPINE BESYLATE 10 MG/1
10 TABLET ORAL DAILY
Qty: 30 TABLET | Refills: 0 | Status: SHIPPED | OUTPATIENT
Start: 2022-12-28 | End: 2023-01-16 | Stop reason: SDUPTHER

## 2022-12-28 RX ORDER — ATORVASTATIN CALCIUM 80 MG/1
80 TABLET, FILM COATED ORAL NIGHTLY
Qty: 30 TABLET | Refills: 0 | Status: SHIPPED | OUTPATIENT
Start: 2022-12-28 | End: 2023-01-16 | Stop reason: SDUPTHER

## 2022-12-28 NOTE — TELEPHONE ENCOUNTER
Pt was last seen on 9/29/2022.   Dr. Dudley is out of the office this week. Could you refill this?

## 2022-12-31 RX ORDER — PANTOPRAZOLE SODIUM 40 MG/1
40 TABLET, DELAYED RELEASE ORAL DAILY
Qty: 30 TABLET | Refills: 0 | OUTPATIENT
Start: 2022-12-31

## 2023-01-05 DIAGNOSIS — D64.9 ANEMIA, UNSPECIFIED TYPE: ICD-10-CM

## 2023-01-05 DIAGNOSIS — K21.9 GASTROESOPHAGEAL REFLUX DISEASE WITHOUT ESOPHAGITIS: Primary | ICD-10-CM

## 2023-01-05 DIAGNOSIS — Z86.010 HISTORY OF COLONIC POLYPS: ICD-10-CM

## 2023-01-10 ENCOUNTER — LAB (OUTPATIENT)
Dept: FAMILY MEDICINE CLINIC | Facility: CLINIC | Age: 78
End: 2023-01-10
Payer: MEDICARE

## 2023-01-10 ENCOUNTER — OFFICE VISIT (OUTPATIENT)
Dept: FAMILY MEDICINE CLINIC | Facility: CLINIC | Age: 78
End: 2023-01-10
Payer: MEDICARE

## 2023-01-10 DIAGNOSIS — F17.200 SMOKER: ICD-10-CM

## 2023-01-10 DIAGNOSIS — Z98.890 HISTORY OF RIGHT-SIDED CAROTID ENDARTERECTOMY: ICD-10-CM

## 2023-01-10 DIAGNOSIS — E11.9 TYPE 2 DIABETES MELLITUS WITHOUT COMPLICATION, WITHOUT LONG-TERM CURRENT USE OF INSULIN: ICD-10-CM

## 2023-01-10 DIAGNOSIS — M85.89 OSTEOPENIA OF MULTIPLE SITES: ICD-10-CM

## 2023-01-10 DIAGNOSIS — M54.59 MECHANICAL LOW BACK PAIN: ICD-10-CM

## 2023-01-10 DIAGNOSIS — I70.0 ATHEROSCLEROSIS OF AORTA: ICD-10-CM

## 2023-01-10 DIAGNOSIS — Z86.010 HISTORY OF COLONIC POLYPS: ICD-10-CM

## 2023-01-10 DIAGNOSIS — I63.421 CEREBRAL INFARCTION DUE TO EMBOLISM OF RIGHT ANTERIOR CEREBRAL ARTERY: ICD-10-CM

## 2023-01-10 DIAGNOSIS — K21.9 GASTROESOPHAGEAL REFLUX DISEASE WITHOUT ESOPHAGITIS: ICD-10-CM

## 2023-01-10 DIAGNOSIS — I63.81 MULTIPLE LACUNAR INFARCTS: ICD-10-CM

## 2023-01-10 DIAGNOSIS — E78.2 MIXED HYPERLIPIDEMIA: Primary | ICD-10-CM

## 2023-01-10 DIAGNOSIS — R35.0 BENIGN PROSTATIC HYPERPLASIA WITH URINARY FREQUENCY: ICD-10-CM

## 2023-01-10 DIAGNOSIS — N40.1 BENIGN PROSTATIC HYPERPLASIA WITH URINARY FREQUENCY: ICD-10-CM

## 2023-01-10 DIAGNOSIS — G25.81 RLS (RESTLESS LEGS SYNDROME): ICD-10-CM

## 2023-01-10 DIAGNOSIS — I10 ESSENTIAL HYPERTENSION: ICD-10-CM

## 2023-01-10 DIAGNOSIS — I70.1 RENAL ARTERY STENOSIS: ICD-10-CM

## 2023-01-10 DIAGNOSIS — F41.8 ANXIETY ASSOCIATED WITH DEPRESSION: ICD-10-CM

## 2023-01-10 DIAGNOSIS — E78.2 MIXED HYPERLIPIDEMIA: ICD-10-CM

## 2023-01-10 DIAGNOSIS — Z85.828 HISTORY OF NONMELANOMA SKIN CANCER: ICD-10-CM

## 2023-01-10 DIAGNOSIS — E29.1 ANDROGEN DEFICIENCY: ICD-10-CM

## 2023-01-10 DIAGNOSIS — D64.9 ANEMIA, UNSPECIFIED TYPE: ICD-10-CM

## 2023-01-10 DIAGNOSIS — I25.10 CORONARY ARTERY CALCIFICATION SEEN ON CT SCAN: ICD-10-CM

## 2023-01-10 DIAGNOSIS — J44.9 MIXED TYPE COPD (CHRONIC OBSTRUCTIVE PULMONARY DISEASE): ICD-10-CM

## 2023-01-10 DIAGNOSIS — Z00.00 HEALTHCARE MAINTENANCE: ICD-10-CM

## 2023-01-10 PROCEDURE — 96160 PT-FOCUSED HLTH RISK ASSMT: CPT | Performed by: GENERAL PRACTICE

## 2023-01-10 PROCEDURE — 1170F FXNL STATUS ASSESSED: CPT | Performed by: GENERAL PRACTICE

## 2023-01-10 PROCEDURE — G0439 PPPS, SUBSEQ VISIT: HCPCS | Performed by: GENERAL PRACTICE

## 2023-01-10 PROCEDURE — 3051F HG A1C>EQUAL 7.0%<8.0%: CPT | Performed by: GENERAL PRACTICE

## 2023-01-10 PROCEDURE — 36415 COLL VENOUS BLD VENIPUNCTURE: CPT | Performed by: GENERAL PRACTICE

## 2023-01-10 PROCEDURE — 1159F MED LIST DOCD IN RCRD: CPT | Performed by: GENERAL PRACTICE

## 2023-01-10 NOTE — PROGRESS NOTES
The ABCs of the Annual Wellness Visit  Subsequent Medicare Wellness Visit    Chief Complaint  Subsequent Medicare Wellness Visit    Subjective    History of Present Illness:  Torin Granados is a 77 y.o. male who presents for a Subsequent Medicare Wellness Visit.    Right Frontal Cerebral Infarcts  He was admitted to [facility] on 10/28/2022 after an episode of abrupt weakness with a collapse to the ground and apparently involuntary movements of both arms and legs.  He did not lose consciousness and recalls the entire experience.  Noncontrast CT of the brain on admission was reported as showing moderate atrophy and a an old lacunar infarct of the anterior limb of the right internal capsule.  CTA of the brain and carotids was reported as showing a 95% stenosis of the proximal right ICA, a less than 50% stenosis of the proximal left ICA, and changes consistent with advanced centrilobular emphysema.  He underwent a right carotid endarterectomy on 11/3/2022.  MRI of the brain performed on 11/4/2022 revealed moderate atrophy, chronic microvascular changes, and old right basal ganglia and periventricular lacunar infarcts, and small acute infarcts of the right frontal cortex and subcortical white matter.  Echocardiogram done while in hospital was reported as showing sclerotic aortic valve leaflets but no other significant normalities with an estimated EF of 65%.  Ultrasound of the renal arteries was reported as showing a more than 60% stenosis on the left and a nonsignificant stenosis on the right.  He was discharged on clopidogrel and low-dose ASA along with levetiracetam.  He continues to be followed by vascular surgery, cardiology, and nephrology.  Unfortunately he continues to smoke.  He denies any falls.  He denies any history of unilateral weakness, numbness, or tingling and there is no history of any loss of vision in either eye or side nor any difficulty talking or understanding what is said to him.    Type 2 Diabetes  Mellitus  He admits to numbness and tingling of the feet.  He continues to deny any visual disturbances, polydipsia, polyuria, hypoglycemia or foot ulcerations. Evaluation to date has been: hemoglobin A1C. Home sugars: have remained at goal. Current treatments: metformin and GLP agonist -liraglutide and basal insulin (together as xultophy -16 units daily).     Hyperlipidemia  His compliance with treatment remains quite good. He is taking atorvastatin and ezetimibe with no apparent side effects    Essential Hypertension  Home blood pressure readings: not doing. He continues to deny any chest pain, palpitations, dyspnea, orthopnea, paroxysmal nocturnal dyspnea or peripheral edema. Current antihypertensive medications includes lisinopril, chlorthalidone, and amlodipine.    BPH  He underwent a TURP greenlight by Dr. Barraza on 6/2/2022.  His course was uncomplicated and his bladder catheter removed 4 days afterward.  He continues to have mild urgency and nocturia x 2, but has noted a continued improvement in his stream and sense of incomplete voiding.    Colonic Polyps  He underwent an updated colonoscopy on 8/17/2022 with removal of 7 tubular adenomatous polyps.  He was advised to have his next study in 3 years    Restless Leg Syndrome  He has a long history of intermittent lower extremity discomfort.  He describes this as a tight ache centered about his knees.  Episodes occur primarily at night.  He admits to occasional knee stiffness but denies any swelling.  He remains on ropinirole 0.5-1 nightly with a dramatic improvement in his symptoms and no apparent side effects thus far    The following portions of the patient's history were reviewed and   updated as appropriate: allergies, current medications, past family history, past medical history, past social history, past surgical history and problem list.    Compared to one year ago, the patient feels his physical   health is worse.    Compared to one year ago, the  patient feels his mental   health is worse.    Recent Hospitalizations:  He was admitted within the past 365 days at [facility].     Current Medical Providers:  Patient Care Team:  Jan Dudley MD as PCP - General (Family Medicine)    Outpatient Medications Prior to Visit   Medication Sig Dispense Refill   • amLODIPine (NORVASC) 10 MG tablet TAKE 1 TABLET BY MOUTH DAILY. 30 tablet 0   • aspirin 81 MG EC tablet Take 1 tablet by mouth Daily. 90 tablet 3   • atorvastatin (LIPITOR) 80 MG tablet TAKE 1 TABLET BY MOUTH EVERY NIGHT. 30 tablet 0   • clopidogrel (PLAVIX) 75 MG tablet Take 1 tablet by mouth Daily. 30 tablet 0   • DULoxetine (CYMBALTA) 60 MG capsule Take 2 capsules by mouth Daily. 180 capsule 3   • ezetimibe (ZETIA) 10 MG tablet TAKE ONE TABLET BY MOUTH EVERY DAY 90 tablet 3   • glucose blood (Accu-Chek Ana Rosa Plus) test strip USE TO TEST BLOOD GLUCOSE THREE TIMES A  each 5   • Insulin Degludec-Liraglutide (Xultophy) 100-3.6 UNIT-MG/ML solution pen-injector subcutaneous pen Inject 16 Units under the skin into the appropriate area as directed Daily. 15 mL 3   • Insulin Pen Needle (BD Pen Needle Courtney U/F) 32G X 4 MM misc 1 each by Other route Daily. use to inject insulin once daily 90 each 3   • Lancets misc 1 three times daily 90 each 5   • lansoprazole (PREVACID) 30 MG capsule TAKE ONE CAPSULE BY MOUTH ONCE DAILY 90 capsule 3   • levETIRAcetam (KEPPRA) 1000 MG tablet TAKE 1 TABLET BY MOUTH 2 TIMES A DAY. 60 tablet 0   • lisinopril (PRINIVIL,ZESTRIL) 40 MG tablet Take 1 tablet by mouth Daily. 90 tablet 3   • montelukast (SINGULAIR) 10 MG tablet TAKE ONE TABLET BY MOUTH ONCE DAILY 90 tablet 3   • nicotine (NICODERM CQ) 21 MG/24HR patch Place 1 patch on the skin as directed by provider Daily. 28 patch 0   • rOPINIRole (REQUIP) 2 MG tablet Take 1 tablet by mouth Every Night. 90 tablet 3   • chlorthalidone (HYGROTON) 25 MG tablet TAKE 1 TABLET BY MOUTH EVERY MORNING. 30 tablet 0   •  ferrous sulfate 325 (65 FE) MG EC tablet Take 1 tablet by mouth Daily With Breakfast. 30 tablet 0   • hydrALAZINE (APRESOLINE) 25 MG tablet TAKE 1 TABLET BY MOUTH 2 TIMES A DAY. 60 tablet 0   • multivitamin with minerals tablet tablet Take 1 tablet by mouth Daily.     • phenylephrine (GAETANO-SYNEPHRINE) 1 % nasal spray 1 spray into the nostril(s) as directed by provider Every 4 (Four) Hours As Needed for Congestion. 1 each 0     No facility-administered medications prior to visit.     No opioid medication identified on active medication list. I have reviewed chart for other potential  high risk medication/s and harmful drug interactions in the elderly.          Aspirin is on active medication list. Aspirin use is indicated based on review of current medical condition/s. Pros and cons of this therapy have been discussed today. Benefits of this medication outweigh potential harm.  Patient has been encouraged to continue taking this medication.  .    Patient Active Problem List   Diagnosis   • Gastroesophageal reflux disease without esophagitis   • Anxiety associated with depression   • Mechanical low back pain   • Atherosclerosis of aorta ([facility])   • Smoker   • Essential hypertension   • Mixed hyperlipidemia   • Type 2 diabetes mellitus without complication, without long-term current use of insulin ([facility])   • Androgen deficiency   • Healthcare maintenance   • History of colonic polyps   • Coronary artery calcification seen on CT scan   • Osteopenia of multiple sites   • Lumbosacral radiculopathy at S1   • Mixed type COPD (chronic obstructive pulmonary disease) ([facility])   • Benign prostatic hyperplasia with urinary frequency   • RLS (restless legs syndrome)   • History of nonmelanoma skin cancer   • Encounter for immunization   • 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  ACP discussion was held with the patient during this visit. Patient does not have an advance directive, information provided.    Review of Systems   Constitutional: Positive for fatigue. Negative for appetite change, chills and fever.   HENT: Positive for rhinorrhea and sneezing. Negative for congestion, ear pain, postnasal drip, sore throat and voice change.    Eyes: Negative for visual disturbance.   Respiratory: Negative for cough, shortness of breath and wheezing.    Cardiovascular: Negative for chest pain, palpitations and leg swelling.   Gastrointestinal: Negative for abdominal pain, blood in stool, constipation, diarrhea, nausea and vomiting.   Genitourinary: Positive for frequency (nocturia x 1-2) and urgency. Negative for dysuria and hematuria.   Musculoskeletal: Positive for arthralgias and back pain. Negative for joint swelling, myalgias and neck pain.   Skin: Negative for rash.   Neurological: Positive for dizziness (with sudden postural changes, particulary getting up from sitting) and numbness (both feet). Negative for weakness.   Psychiatric/Behavioral: Negative for dysphoric mood and sleep disturbance. The patient is nervous/anxious.         Objective    Vitals:    01/10/23 1604   BP: 132/62   Pulse: 84   Resp: 14   Temp: 98.6 °F (37 °C)   TempSrc: Temporal   SpO2: 99%   Weight: 64.4 kg (142 lb)   Height: 172.7 cm (68\")     Estimated body mass index is 21.59 kg/m² as calculated from the following:    Height as of this encounter: 172.7 cm (68\").    Weight as of this encounter: 64.4 kg (142 lb).    BMI is within normal parameters. No other follow-up for BMI required.    Does the patient have evidence of cognitive impairment? No    Physical Exam  Constitutional:       General: He is not in acute distress.     Appearance: Normal appearance. He is well-developed. He is not ill-appearing or diaphoretic.      Comments: Bright and in good spirits. " No apparent distress. No pallor, jaundice, diaphoresis, or cyanosis.   HENT:      Head: Atraumatic.      Right Ear: Tympanic membrane, ear canal and external ear normal.      Left Ear: Tympanic membrane, ear canal and external ear normal.      Mouth/Throat:      Lips: No lesions.      Mouth: Mucous membranes are moist. No oral lesions.      Pharynx: No oropharyngeal exudate or posterior oropharyngeal erythema.   Eyes:      Conjunctiva/sclera: Conjunctivae normal.   Neck:      Thyroid: No thyroid mass or thyromegaly.      Vascular: No carotid bruit or JVD.      Trachea: Trachea normal. No tracheal deviation.   Cardiovascular:      Rate and Rhythm: Normal rate and regular rhythm.      Heart sounds: Normal heart sounds, S1 normal and S2 normal. No murmur heard.    No gallop. No S3 or S4 sounds.   Pulmonary:      Effort: Pulmonary effort is normal.      Breath sounds: Wheezing (diffuse - mild) present.      Comments: Pulmonary hyperinflation  Abdominal:      General: Bowel sounds are normal. There is no distension.      Palpations: Abdomen is soft. There is no hepatomegaly, splenomegaly or mass.      Tenderness: There is no abdominal tenderness.      Hernia: No hernia is present.   Musculoskeletal:      Right lower leg: No edema.      Left lower leg: No edema.      Comments: No peripheral joint redness or warmth.   Lymphadenopathy:      Head:      Right side of head: No submental, submandibular, tonsillar, preauricular, posterior auricular or occipital adenopathy.      Left side of head: No submental, submandibular, tonsillar, preauricular, posterior auricular or occipital adenopathy.      Cervical: No cervical adenopathy.      Upper Body:      Right upper body: No supraclavicular adenopathy.      Left upper body: No supraclavicular adenopathy.   Skin:     General: Skin is warm and dry.      Coloration: Skin is not cyanotic, jaundiced or pale.      Findings: No lesion or rash.      Nails: There is no clubbing.    Neurological:      Mental Status: He is alert and oriented to person, place, and time.      Cranial Nerves: No cranial nerve deficit.      Sensory: Sensory deficit (decreased vibration sense toes of both feet) present.      Motor: No tremor.      Coordination: Coordination normal.      Gait: Gait normal.   Psychiatric:         Attention and Perception: Attention normal.         Mood and Affect: Mood normal.         Speech: Speech normal.         Behavior: Behavior normal.         Thought Content: Thought content normal.       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 No   Do you have difficulty bathing yourself, getting dressed or grooming yourself? No   Do you have difficulty using the toilet? No   Do you have difficulty moving around from place to place? No   Do you have trouble with steps or getting out of a bed or a chair? No   Current Diet Well Balanced Diet   Dental Exam Not up to date   Eye Exam Not up to date   Exercise (times per week) 7 times per week   Current Exercises Include Walking   Current  Exercise Activities Include -   Do you need help using the phone?  No   Are you deaf or do you have serious difficulty hearing?  No   Do you need help with transportation? No   Do you need help shopping? No   Do you need help preparing meals?  No   Do you need help with housework?  No   Do you need help with laundry? No   Do you need help taking your medications? No   Do you need help managing money? No   Do you ever drive or ride in a car without wearing a seat belt? No   Have you felt unusual stress, anger or loneliness in the last month? No   Who do you live with? Spouse   If you need help, do you have trouble finding someone available to you? No   Have you been bothered in the last four weeks by sexual problems? No   Do you have difficulty concentrating, remembering or making decisions? No       Age-appropriate Screening Schedule:  Refer to the list below for future screening recommendations based on patient's age, sex and/or medical conditions. Orders for these recommended tests are listed in the plan section. The patient has been provided with a written plan.    Health Maintenance   Topic Date Due   • DIABETIC FOOT EXAM  09/01/2019   • DXA SCAN  03/02/2021   • DIABETIC EYE EXAM  10/18/2022   • HEMOGLOBIN A1C  07/10/2023   • TDAP/TD VACCINES (2 - Td or Tdap) 01/01/2024   • LIPID PANEL  01/10/2024   • URINE MICROALBUMIN  01/10/2024   • INFLUENZA VACCINE  Completed   • ZOSTER VACCINE  Completed              Assessment & Plan   CMS Preventative Services Quick Reference  Risk Factors Identified During Encounter  Chronic Pain: Natural history and expected course discussed. Questions answered.  Depression/Dysphoria: Current medication is effective, no change recommended  Fall Risk-High or Moderate: Discussed Fall Prevention in the home  Tobacco Use/Dependance Risk (use dotphrase .tobaccocessation for documentation)  The above risks/problems have been discussed with the patient.  Follow up actions/plans if indicated  are seen below in the Assessment/Plan Section.  Pertinent information has been shared with the patient in the After Visit Summary.    Diagnoses and all orders for this visit:    1. Mixed hyperlipidemia  Encouraged to continue to work on his diet and exercise plan.  Continue current medication    2. Essential hypertension   Hypertension: marginal. Evidence of target organ damage: carotid artery stenosis post recent right endarterectomy, coronary artery calcifications and atherosclerosis on previous imaging, bilateral renal artery stenosis, and previous lacunar infarcts.  Encouraged to continue to work on diet and exercise plan.   Continue current medication for now    3. Coronary artery calcification seen on CT scan  Reminded regarding risk factor modification with an emphasis on tobacco cessation.    4. Atherosclerosis of aorta (HCC)  As above.    5. 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  Continue dual antiplatelet therapy  Recommended against driving and advised to avoid heights or water for the time being    6. History of right-sided carotid endarterectomy  As above.  Follow up with vascular surgery    7. Type 2 diabetes mellitus without complication, without long-term current use of insulin (HCC)  Diabetes mellitus Type II, under good control.   Encouraged to continue to pursue ADA diet  Encouraged aerobic exercise.  Given his CV risk, agreed on another trial of empagliflozin.  Will titrate dose as tolerated and consider discontinuing metformin at a later date  Updated labs drawn  -     Empagliflozin-metFORMIN HCl ER 5-1000 MG tablet sustained-release 24 hour; Take 1 tablet by mouth Every Morning.  Dispense: 30 tablet; Refill: 5    8. Androgen deficiency    9. History of colonic polyps    10. Gastroesophageal reflux disease without esophagitis    11. Benign prostatic hyperplasia with urinary frequency    12. Healthcare maintenance  Patient has received a flu shot  along with an updated bivalent COVID-19 shot.    13. History of nonmelanoma skin cancer  Encouraged report if any changes in his skin    14. Anxiety associated with depression  Stable.  Supportive therapy.   Continue current medication.    15. Osteopenia of multiple sites    16. Mechanical low back pain    17. RLS (restless legs syndrome)    18. Mixed type COPD (chronic obstructive pulmonary disease) (HCC)   COPD is stable.  Reminded of the importance of smoking cessation  Encouraged to remain as active as symptoms allow for    19. Renal artery stenosis (HCC)  As above.   Continue current medication.  Follow up with nephrology     20. Multiple lacunar infarcts (HCC)  As above.   Continue current medication.    21. Smoker   Lengthy discussion regarding the potential sequela of continued tobacco use and the options with respect to cessation.  Patient uninterested in pursuing at present but will consider.    Follow Up:   Return in about 6 weeks (around 2/24/2023).     An After Visit Summary and PPPS were made available to the patient.

## 2023-01-10 NOTE — PATIENT INSTRUCTIONS
Advance Directive  Advance directives are legal documents that allow you to make decisions about your health care and medical treatment in case you become unable to communicate for yourself. Advance directives let your wishes be known to family, friends, and health care providers.  Discussing and writing advance directives should happen over time rather than all at once. Advance directives can be changed and updated at any time. There are different types of advance directives, such as:  Medical power of .  Living will.  Do not resuscitate (DNR) order or do not attempt resuscitation (DNAR) order.  Health care proxy and medical power of   A health care proxy is also called a health care agent. This person is appointed to make medical decisions for you when you are unable to make decisions for yourself. Generally, people ask a trusted friend or family member to act as their proxy and represent their preferences. Make sure you have an agreement with your trusted person to act as your proxy. A proxy may have to make a medical decision on your behalf if your wishes are not known.  A medical power of , also called a durable power of  for health care, is a legal document that names your health care proxy. Depending on the laws in your state, the document may need to be:  Signed.  Notarized.  Dated.  Copied.  Witnessed.  Incorporated into your medical record.  You may also want to appoint a trusted person to manage your money in the event you are unable to do so. This is called a durable power of  for finances. It is a separate legal document from the durable power of  for health care. You may choose your health care proxy or someone different to act as your agent in money matters.  If you do not appoint a proxy, or there is a concern that the proxy is not acting in your best interest, a court may appoint a guardian to act on your behalf.  Living will  A living will is a set of  instructions that state your wishes about medical care when you cannot express them yourself. Health care providers should keep a copy of your living will in your medical record. You may want to give a copy to family members or friends. To alert caregivers in case of an emergency, you can place a card in your wallet to let them know that you have a living will and where they can find it. A living will is used if you become:  Terminally ill.  Disabled.  Unable to communicate or make decisions.  The following decisions should be included in your living will:  To use or not to use life support equipment, such as dialysis machines and breathing machines (ventilators).  Whether you want a DNR or DNAR order. This tells health care providers not to use cardiopulmonary resuscitation (CPR) if breathing or heartbeat stops.  To use or not to use tube feeding.  To be given or not to be given food and fluids.  Whether you want comfort (palliative) care when the goal becomes comfort rather than a cure.  Whether you want to donate your organs and tissues.  A living will does not give instructions for distributing your money and property if you should pass away.  DNR or DNAR  A DNR or DNAR order is a request not to have CPR in the event that your heart stops beating or you stop breathing. If a DNR or DNAR order has not been made and shared, a health care provider will try to help any patient whose heart has stopped or who has stopped breathing. If you plan to have surgery, talk with your health care provider about how your DNR or DNAR order will be followed if problems occur.  What if I do not have an advance directive?  Some states assign family decision makers to act on your behalf if you do not have an advance directive. Each state has its own laws about advance directives. You may want to check with your health care provider, , or state representative about the laws in your state.  Summary  Advance directives are legal  documents that allow you to make decisions about your health care and medical treatment in case you become unable to communicate for yourself.  The process of discussing and writing advance directives should happen over time. You can change and update advance directives at any time.  Advance directives may include a medical power of , a living will, and a DNR or DNAR order.  This information is not intended to replace advice given to you by your health care provider. Make sure you discuss any questions you have with your health care provider.  Document Revised: 09/21/2021 Document Reviewed: 09/21/2021  [employer] Patient Education © 2022 [employer]  Fall Prevention in the Home, Adult  Falls can cause injuries and can happen to people of all ages. There are many things you can do to make your home safe and to help prevent falls. Ask for help when making these changes.  What actions can I take to prevent falls?  General Instructions  Use good lighting in all rooms. Replace any light bulbs that burn out.  Turn on the lights in dark areas. Use night-lights.  Keep items that you use often in easy-to-reach places. Lower the shelves around your home if needed.  Set up your furniture so you have a clear path. Avoid moving your furniture around.  Do not have throw rugs or other things on the floor that can make you trip.  Avoid walking on wet floors.  If any of your floors are uneven, fix them.  Add color or contrast paint or tape to clearly merritt and help you see:  Grab bars or handrails.  First and last steps of staircases.  Where the edge of each step is.  If you use a stepladder:  Make sure that it is fully opened. Do not climb a closed stepladder.  Make sure the sides of the stepladder are locked in place.  Ask someone to hold the stepladder while you use it.  Know where your pets are when moving through your home.  What can I do in the bathroom?     Keep the floor dry. Clean up any water on the floor right  away.  Remove soap buildup in the tub or shower.  Use nonskid mats or decals on the floor of the tub or shower.  Attach bath mats securely with double-sided, nonslip rug tape.  If you need to sit down in the shower, use a plastic, nonslip stool.  Install grab bars by the toilet and in the tub and shower. Do not use towel bars as grab bars.  What can I do in the bedroom?  Make sure that you have a light by your bed that is easy to reach.  Do not use any sheets or blankets for your bed that hang to the floor.  Have a firm chair with side arms that you can use for support when you get dressed.  What can I do in the kitchen?  Clean up any spills right away.  If you need to reach something above you, use a step stool with a grab bar.  Keep electrical cords out of the way.  Do not use floor polish or wax that makes floors slippery.  What can I do with my stairs?  Do not leave any items on the stairs.  Make sure that you have a light switch at the top and the bottom of the stairs.  Make sure that there are handrails on both sides of the stairs. Fix handrails that are broken or loose.  Install nonslip stair treads on all your stairs.  Avoid having throw rugs at the top or bottom of the stairs.  Choose a carpet that does not hide the edge of the steps on the stairs.  Check carpeting to make sure that it is firmly attached to the stairs. Fix carpet that is loose or worn.  What can I do on the outside of my home?  Use bright outdoor lighting.  Fix the edges of walkways and driveways and fix any cracks.  Remove anything that might make you trip as you walk through a door, such as a raised step or threshold.  Trim any bushes or trees on paths to your home.  Check to see if handrails are loose or broken and that both sides of all steps have handrails.  Install guardrails along the edges of any raised decks and porches.  Clear paths of anything that can make you trip, such as tools or rocks.  Have leaves, snow, or ice cleared  regularly.  Use sand or salt on paths during winter.  Clean up any spills in your garage right away. This includes grease or oil spills.  What other actions can I take?  Wear shoes that:  Have a low heel. Do not wear high heels.  Have rubber bottoms.  Feel good on your feet and fit well.  Are closed at the toe. Do not wear open-toe sandals.  Use tools that help you move around if needed. These include:  Canes.  Walkers.  Scooters.  Crutches.  Review your medicines with your doctor. Some medicines can make you feel dizzy. This can increase your chance of falling.  Ask your doctor what else you can do to help prevent falls.  Where to find more information  Centers for Disease Control and Prevention, [employer]AD[employer]: www.cdc.gov  [employer] [place] [employer] Aging: www.kriss.nih.gov  Contact a doctor if:  You are afraid of falling at home.  You feel weak, drowsy, or dizzy at home.  You fall at home.  Summary  There are many simple things that you can do to make your home safe and to help prevent falls.  Ways to make your home safe include removing things that can make you trip and installing grab bars in the bathroom.  Ask for help when making these changes in your home.  This information is not intended to replace advice given to you by your health care provider. Make sure you discuss any questions you have with your health care provider.  Document Revised: 09/19/2022 Document Reviewed: 07/21/2021  [employer] Patient Education © 2022 [employer]  Heart Disease Prevention  Heart disease is the leading cause of death in the world. Coronary artery disease is the most common cause of heart disease. This condition results when cholesterol and other substances (plaque) build up inside the walls of the blood vessels that supply your heart muscle (arteries). This buildup in arteries is called atherosclerosis. You can take actions to lower your risk of heart disease.  How can heart disease affect me?  Heart disease can cause many unpleasant  symptoms and complications, such as:  Chest pain (angina).  Reduced or blocked blood flow to your heart. This can cause:  Irregular heartbeats (arrhythmias).  Heart attack.  Heart failure.  What can increase my risk?  The following factors may make you more likely to develop this condition:  High blood pressure (hypertension).  High cholesterol.  A diet high in saturated fats or trans fats.  Obesity.  Diabetes.  Having a family history of heart disease.  Certain lifestyle factors, including:  Smoking.  Lack of physical activity.  Drinking too much alcohol.  What actions can I take to prevent heart disease?  Nutrition    Follow a heart-healthy eating plan as told by your health care provider. Examples include the DASH eating plan. DASH stands for Dietary Approaches to Stop Hypertension.  Generally, it is recommended that you:  Eat less salt (sodium). Ask your health care provider how much sodium is safe for you. Most people should have less than 2,300 mg each day.  Limit unhealthy fats, such as saturated and trans fats, in your diet. You can do this by eating low-fat dairy products, eating less red meat, and avoiding processed foods.  Eat healthy fats (omega-3 fatty acids). These are found in fish, such as mackerel or salmon.  Eat more fruits and vegetables. You should try to fill one-half of your plate with fruits and vegetables at each meal.  Eat more whole grains.  Avoid foods and drinks that have added sugars. Try to limit how much added sugar you have to:  Less than 25 grams a day for women.  Less than 36 grams a day for men.  Lifestyle    Get regular exercise. This is one of the most important things you can do for your health. Generally, it is recommended that you:  Exercise for at least 30 minutes on most days of the week (150 minutes each week). This should be exercise that causes your heart to beat faster (aerobic exercise).  Add strength exercises on at least 2 days each week.  Do not use any products that  contain nicotine or tobacco. These products include cigarettes, chewing tobacco, and vaping devices, such as e-cigarettes. These can damage your heart and blood vessels. If you need help quitting, ask your health care provider.  Alcohol use  Do not drink alcohol if:  Your health care provider tells you not to drink.  You are pregnant, may be pregnant, or are planning to become pregnant.  If you drink alcohol:  Limit how much you have to:  0-1 drink a day for women.  0-2 drinks a day for men.  Know how much alcohol is in your drink. In the [place] one drink equals one 12 oz bottle of beer (355 mL), one 5 oz glass of wine (148 mL), or one 1½ oz glass of hard liquor (44 mL).  Medicines  Take over-the-counter and prescription medicines only as told by your health care provider.  Work with your health care provider to find out whether it is safe and beneficial for you to take aspirin daily. Make sure that you understand how much to take and what form to take.  Depending on your risk factors, your health care provider may prescribe medicines to lower your risk of heart disease or to control related conditions. You may take medicine to:  Lower cholesterol.  Control blood pressure.  Control diabetes.  General information  Keep your blood pressure under control, as recommended by your health care provider. For most healthy people, the upper number of their blood pressure (systolic) should be no higher than 120, and the lower number (diastolic) no higher than 80. Treatment may be needed if your blood pressure is higher than 130/80.  Have your blood pressure checked at least every 2 years. Your health care provider may check your blood pressure more often if you have high blood pressure.  After age 20, have your cholesterol checked every 4-6 years. If you have risk factors for heart disease, you may need to have it checked more often. Treatment may be needed if your cholesterol is high.  Have your body mass index (BMI) checked  every year. Your health care provider can calculate your BMI from your height and weight.  Check your waist circumference. It should be:  No more than 35 inches (89 cm) for women who are not pregnant.  No more than 40 inches (102 cm) for men.  Work with your health care provider to lose weight, if needed, or to maintain a healthy weight.  Where to find more information:  Centers for Disease Control and Prevention: www.cdc.gov/heartdisease  American Heart Association: www.heart.org  Summary  Heart disease is the leading cause of death in the world.  Heart disease can cause chest pain, abnormal heart rhythms, heart attack, and heart failure.  Some of the risk factors for heart disease include high blood pressure, high cholesterol, and smoking.  You can take actions to lower your chances of developing heart disease. Work with your health care provider to reduce your risk by following a heart-healthy diet, being physically active, and controlling your weight, blood pressure, and cholesterol level.  This information is not intended to replace advice given to you by your health care provider. Make sure you discuss any questions you have with your health care provider.  Document Revised: 08/17/2022 Document Reviewed: 08/17/2022  [employer] Patient Education © 2022 [employer].

## 2023-01-11 VITALS
OXYGEN SATURATION: 99 % | SYSTOLIC BLOOD PRESSURE: 132 MMHG | HEIGHT: 68 IN | TEMPERATURE: 98.6 F | HEART RATE: 84 BPM | WEIGHT: 142 LBS | BODY MASS INDEX: 21.52 KG/M2 | DIASTOLIC BLOOD PRESSURE: 62 MMHG | RESPIRATION RATE: 14 BRPM

## 2023-01-11 PROBLEM — I70.1 RENAL ARTERY STENOSIS: Status: ACTIVE | Noted: 2023-01-11

## 2023-01-11 PROBLEM — I63.81 MULTIPLE LACUNAR INFARCTS (HCC): Status: ACTIVE | Noted: 2023-01-11

## 2023-01-11 LAB
25(OH)D3+25(OH)D2 SERPL-MCNC: 45.8 NG/ML (ref 30–100)
ALBUMIN SERPL-MCNC: 4.1 G/DL (ref 3.5–5.2)
ALBUMIN/GLOB SERPL: 1.6 G/DL
ALP SERPL-CCNC: 69 U/L (ref 39–117)
ALT SERPL-CCNC: 25 U/L (ref 1–41)
AST SERPL-CCNC: 23 U/L (ref 1–40)
BASOPHILS # BLD AUTO: 0.02 10*3/MM3 (ref 0–0.2)
BASOPHILS NFR BLD AUTO: 0.2 % (ref 0–1.5)
BILIRUB SERPL-MCNC: <0.2 MG/DL (ref 0–1.2)
BUN SERPL-MCNC: 19 MG/DL (ref 8–23)
BUN/CREAT SERPL: 21.6 (ref 7–25)
CALCIUM SERPL-MCNC: 9.3 MG/DL (ref 8.6–10.5)
CHLORIDE SERPL-SCNC: 89 MMOL/L (ref 98–107)
CHOLEST SERPL-MCNC: 136 MG/DL (ref 0–200)
CO2 SERPL-SCNC: 23.7 MMOL/L (ref 22–29)
CREAT SERPL-MCNC: 0.88 MG/DL (ref 0.76–1.27)
EGFRCR SERPLBLD CKD-EPI 2021: 89.7 ML/MIN/1.73
EOSINOPHIL # BLD AUTO: 0.04 10*3/MM3 (ref 0–0.4)
EOSINOPHIL NFR BLD AUTO: 0.4 % (ref 0.3–6.2)
ERYTHROCYTE [DISTWIDTH] IN BLOOD BY AUTOMATED COUNT: 15.1 % (ref 12.3–15.4)
FERRITIN SERPL-MCNC: 55.4 NG/ML (ref 30–400)
GLOBULIN SER CALC-MCNC: 2.5 GM/DL
GLUCOSE SERPL-MCNC: 112 MG/DL (ref 65–99)
HBA1C MFR BLD: 7.3 % (ref 4.8–5.6)
HCT VFR BLD AUTO: 34.3 % (ref 37.5–51)
HDLC SERPL-MCNC: 50 MG/DL (ref 40–60)
HGB BLD-MCNC: 11.4 G/DL (ref 13–17.7)
IMM GRANULOCYTES # BLD AUTO: 0.05 10*3/MM3 (ref 0–0.05)
IMM GRANULOCYTES NFR BLD AUTO: 0.5 % (ref 0–0.5)
IRON SERPL-MCNC: 62 MCG/DL (ref 59–158)
LDLC SERPL CALC-MCNC: 71 MG/DL (ref 0–100)
LYMPHOCYTES # BLD AUTO: 1.66 10*3/MM3 (ref 0.7–3.1)
LYMPHOCYTES NFR BLD AUTO: 17.8 % (ref 19.6–45.3)
MCH RBC QN AUTO: 29.8 PG (ref 26.6–33)
MCHC RBC AUTO-ENTMCNC: 33.2 G/DL (ref 31.5–35.7)
MCV RBC AUTO: 89.8 FL (ref 79–97)
MICROALBUMIN UR-MCNC: 7.8 UG/ML
MONOCYTES # BLD AUTO: 0.64 10*3/MM3 (ref 0.1–0.9)
MONOCYTES NFR BLD AUTO: 6.9 % (ref 5–12)
NEUTROPHILS # BLD AUTO: 6.9 10*3/MM3 (ref 1.7–7)
NEUTROPHILS NFR BLD AUTO: 74.2 % (ref 42.7–76)
NRBC BLD AUTO-RTO: 0 /100 WBC (ref 0–0.2)
PLATELET # BLD AUTO: 285 10*3/MM3 (ref 140–450)
POTASSIUM SERPL-SCNC: 4.4 MMOL/L (ref 3.5–5.2)
PROT SERPL-MCNC: 6.6 G/DL (ref 6–8.5)
RBC # BLD AUTO: 3.82 10*6/MM3 (ref 4.14–5.8)
SODIUM SERPL-SCNC: 126 MMOL/L (ref 136–145)
TRANSFERRIN SERPL-MCNC: 318 MG/DL (ref 177–329)
TRIGL SERPL-MCNC: 75 MG/DL (ref 0–150)
TSH SERPL DL<=0.005 MIU/L-ACNC: 1.54 UIU/ML (ref 0.27–4.2)
VIT B12 SERPL-MCNC: 567 PG/ML (ref 211–946)
VLDLC SERPL CALC-MCNC: 15 MG/DL (ref 5–40)
WBC # BLD AUTO: 9.31 10*3/MM3 (ref 3.4–10.8)

## 2023-01-11 NOTE — PROGRESS NOTES
Sent [employer] message.     -- Please let patient know that he should stop chlorthalidone (its likely causing a drop in his sodium/hyponatremia) His iron is fine and he does not have to resume supplementation

## 2023-01-16 DIAGNOSIS — E11.9 TYPE 2 DIABETES MELLITUS WITHOUT COMPLICATION, WITHOUT LONG-TERM CURRENT USE OF INSULIN: ICD-10-CM

## 2023-01-16 DIAGNOSIS — M79.605 PAIN IN BOTH LOWER EXTREMITIES: ICD-10-CM

## 2023-01-16 DIAGNOSIS — J44.9 MIXED TYPE COPD (CHRONIC OBSTRUCTIVE PULMONARY DISEASE): ICD-10-CM

## 2023-01-16 DIAGNOSIS — E78.49 OTHER HYPERLIPIDEMIA: ICD-10-CM

## 2023-01-16 DIAGNOSIS — M79.604 PAIN IN BOTH LOWER EXTREMITIES: ICD-10-CM

## 2023-01-16 DIAGNOSIS — I25.10 CORONARY ARTERY CALCIFICATION SEEN ON CT SCAN: ICD-10-CM

## 2023-01-16 DIAGNOSIS — I10 ESSENTIAL HYPERTENSION: ICD-10-CM

## 2023-01-16 DIAGNOSIS — I70.0 ATHEROSCLEROSIS OF AORTA: ICD-10-CM

## 2023-01-16 RX ORDER — ROPINIROLE 2 MG/1
2 TABLET, FILM COATED ORAL NIGHTLY
Qty: 90 TABLET | Refills: 3 | Status: CANCELLED | OUTPATIENT
Start: 2023-01-16

## 2023-01-16 RX ORDER — EZETIMIBE 10 MG/1
10 TABLET ORAL DAILY
Qty: 90 TABLET | Refills: 3 | Status: SHIPPED | OUTPATIENT
Start: 2023-01-16

## 2023-01-16 RX ORDER — LANSOPRAZOLE 30 MG/1
30 CAPSULE, DELAYED RELEASE ORAL DAILY
Qty: 90 CAPSULE | Refills: 3 | Status: CANCELLED | OUTPATIENT
Start: 2023-01-16

## 2023-01-16 RX ORDER — MONTELUKAST SODIUM 10 MG/1
10 TABLET ORAL DAILY
Qty: 90 TABLET | Refills: 3 | Status: SHIPPED | OUTPATIENT
Start: 2023-01-16

## 2023-01-16 RX ORDER — AMLODIPINE BESYLATE 10 MG/1
10 TABLET ORAL DAILY
Qty: 30 TABLET | Refills: 0 | Status: SHIPPED | OUTPATIENT
Start: 2023-01-16 | End: 2023-02-08 | Stop reason: SDUPTHER

## 2023-01-16 RX ORDER — ATORVASTATIN CALCIUM 80 MG/1
80 TABLET, FILM COATED ORAL NIGHTLY
Qty: 30 TABLET | Refills: 0 | Status: CANCELLED | OUTPATIENT
Start: 2023-01-16

## 2023-01-16 RX ORDER — LEVETIRACETAM 1000 MG/1
1000 TABLET ORAL 2 TIMES DAILY
Qty: 60 TABLET | Refills: 0 | Status: CANCELLED | OUTPATIENT
Start: 2023-01-16

## 2023-01-16 RX ORDER — LISINOPRIL 40 MG/1
40 TABLET ORAL DAILY
Qty: 90 TABLET | Refills: 3 | Status: SHIPPED | OUTPATIENT
Start: 2023-01-16

## 2023-01-16 RX ORDER — ROPINIROLE 2 MG/1
2 TABLET, FILM COATED ORAL NIGHTLY
Qty: 90 TABLET | Refills: 3 | Status: SHIPPED | OUTPATIENT
Start: 2023-01-16 | End: 2023-01-30 | Stop reason: SDUPTHER

## 2023-01-16 RX ORDER — (INSULIN DEGLUDEC AND LIRAGLUTIDE) 100; 3.6 [IU]/ML; MG/ML
16 INJECTION, SOLUTION SUBCUTANEOUS DAILY
Qty: 15 ML | Refills: 3 | Status: CANCELLED | OUTPATIENT
Start: 2023-01-16

## 2023-01-16 RX ORDER — CLOPIDOGREL BISULFATE 75 MG/1
75 TABLET ORAL DAILY
Qty: 30 TABLET | Refills: 0 | Status: SHIPPED | OUTPATIENT
Start: 2023-01-16 | End: 2023-02-08 | Stop reason: SDUPTHER

## 2023-01-16 RX ORDER — EZETIMIBE 10 MG/1
10 TABLET ORAL DAILY
Qty: 90 TABLET | Refills: 3 | Status: CANCELLED | OUTPATIENT
Start: 2023-01-16

## 2023-01-16 RX ORDER — LISINOPRIL 40 MG/1
40 TABLET ORAL DAILY
Qty: 90 TABLET | Refills: 3 | Status: CANCELLED | OUTPATIENT
Start: 2023-01-16

## 2023-01-16 RX ORDER — (INSULIN DEGLUDEC AND LIRAGLUTIDE) 100; 3.6 [IU]/ML; MG/ML
16 INJECTION, SOLUTION SUBCUTANEOUS DAILY
Qty: 15 ML | Refills: 3 | Status: SHIPPED | OUTPATIENT
Start: 2023-01-16 | End: 2023-02-14

## 2023-01-16 RX ORDER — AMLODIPINE BESYLATE 10 MG/1
10 TABLET ORAL DAILY
Qty: 30 TABLET | Refills: 0 | Status: CANCELLED | OUTPATIENT
Start: 2023-01-16

## 2023-01-16 RX ORDER — ASPIRIN 81 MG/1
81 TABLET ORAL DAILY
Qty: 90 TABLET | Refills: 3 | Status: SHIPPED | OUTPATIENT
Start: 2023-01-16

## 2023-01-16 RX ORDER — LANCETS 30 GAUGE
EACH MISCELLANEOUS
Qty: 300 EACH | Refills: 3 | Status: SHIPPED | OUTPATIENT
Start: 2023-01-16

## 2023-01-16 RX ORDER — PEN NEEDLE, DIABETIC 32GX 5/32"
1 NEEDLE, DISPOSABLE MISCELLANEOUS DAILY
Qty: 90 EACH | Refills: 3 | Status: SHIPPED | OUTPATIENT
Start: 2023-01-16

## 2023-01-16 RX ORDER — LEVETIRACETAM 1000 MG/1
1000 TABLET ORAL 2 TIMES DAILY
Qty: 180 TABLET | Refills: 3 | Status: SHIPPED | OUTPATIENT
Start: 2023-01-16

## 2023-01-16 RX ORDER — ASPIRIN 81 MG/1
81 TABLET ORAL DAILY
Qty: 90 TABLET | Refills: 3 | Status: CANCELLED | OUTPATIENT
Start: 2023-01-16

## 2023-01-16 RX ORDER — ATORVASTATIN CALCIUM 80 MG/1
80 TABLET, FILM COATED ORAL NIGHTLY
Qty: 30 TABLET | Refills: 0 | Status: SHIPPED | OUTPATIENT
Start: 2023-01-16 | End: 2023-02-08 | Stop reason: SDUPTHER

## 2023-01-16 RX ORDER — DULOXETIN HYDROCHLORIDE 60 MG/1
120 CAPSULE, DELAYED RELEASE ORAL DAILY
Qty: 180 CAPSULE | Refills: 3 | Status: CANCELLED | OUTPATIENT
Start: 2023-01-16

## 2023-01-16 RX ORDER — CLOPIDOGREL BISULFATE 75 MG/1
75 TABLET ORAL DAILY
Qty: 30 TABLET | Refills: 0 | Status: CANCELLED | OUTPATIENT
Start: 2023-01-16

## 2023-01-16 RX ORDER — LANSOPRAZOLE 30 MG/1
30 CAPSULE, DELAYED RELEASE ORAL DAILY
Qty: 90 CAPSULE | Refills: 3 | Status: SHIPPED | OUTPATIENT
Start: 2023-01-16

## 2023-01-16 RX ORDER — MONTELUKAST SODIUM 10 MG/1
10 TABLET ORAL DAILY
Qty: 90 TABLET | Refills: 3 | Status: CANCELLED | OUTPATIENT
Start: 2023-01-16

## 2023-01-16 RX ORDER — DULOXETIN HYDROCHLORIDE 60 MG/1
120 CAPSULE, DELAYED RELEASE ORAL DAILY
Qty: 180 CAPSULE | Refills: 3 | Status: SHIPPED | OUTPATIENT
Start: 2023-01-16

## 2023-01-16 NOTE — TELEPHONE ENCOUNTER
Pt is aware of this information.       ----- Message from Jan Dudley MD sent at 1/16/2023  2:53 PM EST -----  All four have been discontinued    ----- Message -----  From: Em Mora MA  Sent: 1/16/2023   1:37 PM EST  To: Jan Dudley MD    Pharmacy called to get refills for the patient and had a few medicine's that we do not have on his list. Will you look at these and see if he still needs to be on them and if so send them in.     Ferrous sulfate 325  Hydralazine 25  Pantoprazole 40  Chlorthalidone 25

## 2023-01-20 ENCOUNTER — TELEPHONE (OUTPATIENT)
Dept: FAMILY MEDICINE CLINIC | Facility: CLINIC | Age: 78
End: 2023-01-20

## 2023-01-20 NOTE — TELEPHONE ENCOUNTER
Caller: [facility] [address] - [phone]  - [phone] FX    Relationship: Pharmacy        What medications are you currently taking:   Current Outpatient Medications on File Prior to Visit   Medication Sig Dispense Refill   • amLODIPine (NORVASC) 10 MG tablet Take 1 tablet by mouth Daily. 30 tablet 0   • aspirin 81 MG EC tablet Take 1 tablet by mouth Daily. 90 tablet 3   • atorvastatin (LIPITOR) 80 MG tablet Take 1 tablet by mouth Every Night. 30 tablet 0   • clopidogrel (PLAVIX) 75 MG tablet Take 1 tablet by mouth Daily. 30 tablet 0   • DULoxetine (CYMBALTA) 60 MG capsule Take 2 capsules by mouth Daily. 180 capsule 3   • Empagliflozin-metFORMIN HCl ER 5-1000 MG tablet sustained-release 24 hour Take 1 tablet by mouth Every Morning. 30 tablet 5   • ezetimibe (ZETIA) 10 MG tablet Take 1 tablet by mouth Daily. 90 tablet 3   • glucose blood (Accu-Chek Ana Rosa Plus) test strip USE TO TEST BLOOD GLUCOSE THREE TIMES A  each 5   • Insulin Degludec-Liraglutide (Xultophy) 100-3.6 UNIT-MG/ML solution pen-injector subcutaneous pen Inject 16 Units under the skin into the appropriate area as directed Daily. 15 mL 3   • Insulin Pen Needle (BD Pen Needle Courtney U/F) 32G X 4 MM misc 1 each by Other route Daily. use to inject insulin once daily 90 each 3   • Lancets misc 1 three times daily 300 each 3   • lansoprazole (PREVACID) 30 MG capsule Take 1 capsule by mouth Daily. 90 capsule 3   • levETIRAcetam (KEPPRA) 1000 MG tablet Take 1 tablet by mouth 2 (Two) Times a Day. 180 tablet 3   • lisinopril (PRINIVIL,ZESTRIL) 40 MG tablet Take 1 tablet by mouth Daily. 90 tablet 3   • montelukast (SINGULAIR) 10 MG tablet Take 1 tablet by mouth Daily. 90 tablet 3   • nicotine (NICODERM CQ) 21 MG/24HR patch Place 1 patch on the skin as directed by provider Daily. 28 patch 0   • rOPINIRole (REQUIP) 2 MG tablet Take 1 tablet by mouth Every Night. 90 tablet 3     No current  facility-administered medications on file prior to visit.        What are your concerns: REQUEST PT UP TO DATE LIST OF MEDICATIONS THAT PT IS CURRENTLY TAKING  FAX:[phone]

## 2023-01-30 DIAGNOSIS — M79.604 PAIN IN BOTH LOWER EXTREMITIES: ICD-10-CM

## 2023-01-30 DIAGNOSIS — M79.605 PAIN IN BOTH LOWER EXTREMITIES: ICD-10-CM

## 2023-01-30 RX ORDER — ROPINIROLE 2 MG/1
2 TABLET, FILM COATED ORAL NIGHTLY
Qty: 90 TABLET | Refills: 3 | Status: SHIPPED | OUTPATIENT
Start: 2023-01-30 | End: 2023-02-12 | Stop reason: SDUPTHER

## 2023-02-07 ENCOUNTER — TELEPHONE (OUTPATIENT)
Dept: FAMILY MEDICINE CLINIC | Facility: CLINIC | Age: 78
End: 2023-02-07

## 2023-02-07 NOTE — TELEPHONE ENCOUNTER
Caller: Torin Granados    Relationship: Self    Best call back number:[phone]     What medications are you currently taking:   Current Outpatient Medications on File Prior to Visit   Medication Sig Dispense Refill   • amLODIPine (NORVASC) 10 MG tablet Take 1 tablet by mouth Daily. 30 tablet 0   • aspirin 81 MG EC tablet Take 1 tablet by mouth Daily. 90 tablet 3   • atorvastatin (LIPITOR) 80 MG tablet Take 1 tablet by mouth Every Night. 30 tablet 0   • clopidogrel (PLAVIX) 75 MG tablet Take 1 tablet by mouth Daily. 30 tablet 0   • DULoxetine (CYMBALTA) 60 MG capsule Take 2 capsules by mouth Daily. 180 capsule 3   • Empagliflozin-metFORMIN HCl ER 5-1000 MG tablet sustained-release 24 hour Take 1 tablet by mouth Every Morning. 30 tablet 5   • ezetimibe (ZETIA) 10 MG tablet Take 1 tablet by mouth Daily. 90 tablet 3   • glucose blood (Accu-Chek Ana Rosa Plus) test strip USE TO TEST BLOOD GLUCOSE THREE TIMES A  each 5   • Insulin Degludec-Liraglutide (Xultophy) 100-3.6 UNIT-MG/ML solution pen-injector subcutaneous pen Inject 16 Units under the skin into the appropriate area as directed Daily. 15 mL 3   • Insulin Pen Needle (BD Pen Needle Courtney U/F) 32G X 4 MM misc 1 each by Other route Daily. use to inject insulin once daily 90 each 3   • Lancets misc 1 three times daily 300 each 3   • lansoprazole (PREVACID) 30 MG capsule Take 1 capsule by mouth Daily. 90 capsule 3   • levETIRAcetam (KEPPRA) 1000 MG tablet Take 1 tablet by mouth 2 (Two) Times a Day. 180 tablet 3   • lisinopril (PRINIVIL,ZESTRIL) 40 MG tablet Take 1 tablet by mouth Daily. 90 tablet 3   • montelukast (SINGULAIR) 10 MG tablet Take 1 tablet by mouth Daily. 90 tablet 3   • nicotine (NICODERM CQ) 21 MG/24HR patch Place 1 patch on the skin as directed by provider Daily. 28 patch 0   • rOPINIRole (REQUIP) 2 MG tablet Take 1 tablet by mouth Every Night. 90 tablet 3     No current facility-administered medications on file prior to visit.       Who  prescribed you this medication: DR BROWN    What are your concerns: PATIENT ADVISED PER LAST MYCHART CONVERSATION WITH DR BROWN, [facility] IS ONLY PRESCRIBING 30 DAY SUPPLIES FOR EACH OF THE MEDICATIONS THE PATIENT IS NEEDING REFILLED. PATIENT IS REQUESTING THESE ALL BE CHANGED TO A 90 DAY SUPPLY EXCEPT THE INSULIN.

## 2023-02-08 DIAGNOSIS — E11.9 TYPE 2 DIABETES MELLITUS WITHOUT COMPLICATION, WITHOUT LONG-TERM CURRENT USE OF INSULIN: ICD-10-CM

## 2023-02-08 DIAGNOSIS — I10 ESSENTIAL HYPERTENSION: Primary | ICD-10-CM

## 2023-02-08 DIAGNOSIS — E78.2 MIXED HYPERLIPIDEMIA: ICD-10-CM

## 2023-02-08 DIAGNOSIS — Z98.890 HISTORY OF RIGHT-SIDED CAROTID ENDARTERECTOMY: ICD-10-CM

## 2023-02-08 RX ORDER — AMLODIPINE BESYLATE 10 MG/1
10 TABLET ORAL DAILY
Qty: 90 TABLET | Refills: 3 | Status: SHIPPED | OUTPATIENT
Start: 2023-02-08 | End: 2023-02-12 | Stop reason: SDUPTHER

## 2023-02-08 RX ORDER — ATORVASTATIN CALCIUM 80 MG/1
80 TABLET, FILM COATED ORAL NIGHTLY
Qty: 90 TABLET | Refills: 3 | Status: SHIPPED | OUTPATIENT
Start: 2023-02-08 | End: 2023-02-12 | Stop reason: SDUPTHER

## 2023-02-08 RX ORDER — CLOPIDOGREL BISULFATE 75 MG/1
75 TABLET ORAL DAILY
Qty: 90 TABLET | Refills: 3 | Status: SHIPPED | OUTPATIENT
Start: 2023-02-08 | End: 2023-02-12 | Stop reason: SDUPTHER

## 2023-02-12 DIAGNOSIS — E78.2 MIXED HYPERLIPIDEMIA: ICD-10-CM

## 2023-02-12 DIAGNOSIS — M79.605 PAIN IN BOTH LOWER EXTREMITIES: ICD-10-CM

## 2023-02-12 DIAGNOSIS — M79.604 PAIN IN BOTH LOWER EXTREMITIES: ICD-10-CM

## 2023-02-12 DIAGNOSIS — I10 ESSENTIAL HYPERTENSION: ICD-10-CM

## 2023-02-12 DIAGNOSIS — Z98.890 HISTORY OF RIGHT-SIDED CAROTID ENDARTERECTOMY: ICD-10-CM

## 2023-02-12 RX ORDER — AMLODIPINE BESYLATE 10 MG/1
10 TABLET ORAL DAILY
Qty: 90 TABLET | Refills: 3 | Status: SHIPPED | OUTPATIENT
Start: 2023-02-12

## 2023-02-12 RX ORDER — ATORVASTATIN CALCIUM 80 MG/1
80 TABLET, FILM COATED ORAL NIGHTLY
Qty: 90 TABLET | Refills: 3 | Status: SHIPPED | OUTPATIENT
Start: 2023-02-12

## 2023-02-12 RX ORDER — CLOPIDOGREL BISULFATE 75 MG/1
75 TABLET ORAL DAILY
Qty: 90 TABLET | Refills: 3 | Status: SHIPPED | OUTPATIENT
Start: 2023-02-12

## 2023-02-12 RX ORDER — ROPINIROLE 2 MG/1
2 TABLET, FILM COATED ORAL NIGHTLY
Qty: 90 TABLET | Refills: 3 | Status: SHIPPED | OUTPATIENT
Start: 2023-02-12 | End: 2023-02-24 | Stop reason: SDUPTHER

## 2023-02-14 DIAGNOSIS — E11.9 TYPE 2 DIABETES MELLITUS WITHOUT COMPLICATION, WITHOUT LONG-TERM CURRENT USE OF INSULIN: ICD-10-CM

## 2023-02-14 RX ORDER — (INSULIN DEGLUDEC AND LIRAGLUTIDE) 100; 3.6 [IU]/ML; MG/ML
16 INJECTION, SOLUTION SUBCUTANEOUS DAILY
Qty: 30 ML | Refills: 3 | Status: SHIPPED | OUTPATIENT
Start: 2023-02-14

## 2023-02-24 ENCOUNTER — OFFICE VISIT (OUTPATIENT)
Dept: FAMILY MEDICINE CLINIC | Facility: CLINIC | Age: 78
End: 2023-02-24
Payer: MEDICARE

## 2023-02-24 DIAGNOSIS — F17.200 SMOKER: ICD-10-CM

## 2023-02-24 DIAGNOSIS — Z00.00 HEALTHCARE MAINTENANCE: ICD-10-CM

## 2023-02-24 DIAGNOSIS — J44.9 MIXED TYPE COPD (CHRONIC OBSTRUCTIVE PULMONARY DISEASE): ICD-10-CM

## 2023-02-24 DIAGNOSIS — N40.1 BENIGN PROSTATIC HYPERPLASIA WITH URINARY FREQUENCY: ICD-10-CM

## 2023-02-24 DIAGNOSIS — I70.0 ATHEROSCLEROSIS OF AORTA: ICD-10-CM

## 2023-02-24 DIAGNOSIS — E78.2 MIXED HYPERLIPIDEMIA: ICD-10-CM

## 2023-02-24 DIAGNOSIS — M79.604 PAIN IN BOTH LOWER EXTREMITIES: ICD-10-CM

## 2023-02-24 DIAGNOSIS — I25.10 CORONARY ARTERY CALCIFICATION SEEN ON CT SCAN: ICD-10-CM

## 2023-02-24 DIAGNOSIS — Z86.010 HISTORY OF COLONIC POLYPS: ICD-10-CM

## 2023-02-24 DIAGNOSIS — G89.29 CHRONIC PAIN OF LEFT KNEE: ICD-10-CM

## 2023-02-24 DIAGNOSIS — I10 ESSENTIAL HYPERTENSION: ICD-10-CM

## 2023-02-24 DIAGNOSIS — M85.89 OSTEOPENIA OF MULTIPLE SITES: ICD-10-CM

## 2023-02-24 DIAGNOSIS — M25.562 CHRONIC PAIN OF LEFT KNEE: ICD-10-CM

## 2023-02-24 DIAGNOSIS — I63.421 CEREBRAL INFARCTION DUE TO EMBOLISM OF RIGHT ANTERIOR CEREBRAL ARTERY: ICD-10-CM

## 2023-02-24 DIAGNOSIS — E11.9 TYPE 2 DIABETES MELLITUS WITHOUT COMPLICATION, WITHOUT LONG-TERM CURRENT USE OF INSULIN: ICD-10-CM

## 2023-02-24 DIAGNOSIS — I63.81 MULTIPLE LACUNAR INFARCTS: ICD-10-CM

## 2023-02-24 DIAGNOSIS — K21.9 GASTROESOPHAGEAL REFLUX DISEASE WITHOUT ESOPHAGITIS: ICD-10-CM

## 2023-02-24 DIAGNOSIS — F41.8 ANXIETY ASSOCIATED WITH DEPRESSION: ICD-10-CM

## 2023-02-24 DIAGNOSIS — M79.605 PAIN IN BOTH LOWER EXTREMITIES: ICD-10-CM

## 2023-02-24 DIAGNOSIS — Z98.890 HISTORY OF RIGHT-SIDED CAROTID ENDARTERECTOMY: ICD-10-CM

## 2023-02-24 DIAGNOSIS — R35.0 BENIGN PROSTATIC HYPERPLASIA WITH URINARY FREQUENCY: ICD-10-CM

## 2023-02-24 DIAGNOSIS — G25.81 RLS (RESTLESS LEGS SYNDROME): ICD-10-CM

## 2023-02-24 DIAGNOSIS — I70.1 RENAL ARTERY STENOSIS: Primary | ICD-10-CM

## 2023-02-24 DIAGNOSIS — M54.59 MECHANICAL LOW BACK PAIN: ICD-10-CM

## 2023-02-24 PROCEDURE — 3051F HG A1C>EQUAL 7.0%<8.0%: CPT | Performed by: GENERAL PRACTICE

## 2023-02-24 PROCEDURE — 99215 OFFICE O/P EST HI 40 MIN: CPT | Performed by: GENERAL PRACTICE

## 2023-02-24 RX ORDER — ROPINIROLE 4 MG/1
4 TABLET, FILM COATED ORAL NIGHTLY
Qty: 90 TABLET | Refills: 3 | Status: SHIPPED | OUTPATIENT
Start: 2023-02-24

## 2023-02-24 NOTE — PROGRESS NOTES
Subjective   Torin Granados is a 77 y.o. male.     Chief Complaint  He returns for a scheduled reassessment of multiple medical problems including recent right frontal cerebral infarcts, type 2 diabetes mellitus, hyperlipidemia, essential hypertension, BPH, and intermittent left posterior knee swelling     History of Present Illness     Left Posterior Knee Swelling  Since last here, he has had intermittent swelling of his left posterior knee.  This has been associated with intermittent pain of that joint in his left hip with weightbearing.  There is no history of any stiffness and he denies any giving way or locking.  He gives a history of several implant procedures on his left knee 1 of which was apparently an ACL repair.  He is concerned that he might have a clot in that leg.  He is right-hand dominant    Right Frontal Cerebral Infarcts  He was admitted to [facility] on 10/28/2022 after an episode of abrupt generalized weakness with a collapse to the ground and apparently involuntary movements of both arms and legs.  He did not lose consciousness and recalls the entire experience.  Noncontrast CT of the brain on admission was reported as showing moderate atrophy and a an old lacunar infarct of the anterior limb of the right internal capsule.  CTA of the brain and carotids was reported as showing a 95% stenosis of the proximal right ICA, a less than 50% stenosis of the proximal left ICA, and changes consistent with advanced centrilobular emphysema.  He underwent a right carotid endarterectomy on 11/3/2022.  MRI of the brain performed on 11/4/2022 revealed moderate atrophy, chronic microvascular changes, and old right basal ganglia and periventricular lacunar infarcts, and small acute infarcts of the right frontal cortex and subcortical white matter.  Echocardiogram done while in hospital was reported as showing sclerotic aortic valve leaflets but no other significant normalities with an estimated EF of 65%.  Ultrasound of  the renal arteries was reported as showing a more than 60% stenosis on the left and a nonsignificant stenosis on the right.  He was discharged on clopidogrel and low-dose ASA along with levetiracetam.  He continues to be followed by vascular surgery, cardiology, and nephrology.  Unfortunately he continues to smoke.  He denies any falls.  He denies any history of unilateral weakness, numbness, or tingling and there is no history of any loss of vision in either eye or side nor any difficulty talking or understanding what is said to him.  Lab Results   Component Value Date    WBC 9.31 01/10/2023    HGB 11.4 (L) 01/10/2023    HCT 34.3 (L) 01/10/2023    MCV 89.8 01/10/2023     01/10/2023     Type 2 Diabetes Mellitus  He admits to numbness and tingling of the feet.  He continues to deny any visual disturbances, polydipsia, polyuria, hypoglycemia or foot ulcerations. Evaluation to date has been: hemoglobin A1C. Home sugars: have remained at goal. Current treatments: metformin, empagliflozin, and GLP agonist -liraglutide and basal insulin (together as xultophy -16 units daily).   Lab Results   Component Value Date    HGBA1C 7.30 (H) 01/10/2023     Lab Results   Component Value Date    MICROALBUR 7.8 01/10/2023     Lab Results   Component Value Date    BOQXDSED41 567 01/10/2023     Hyperlipidemia  His compliance with treatment remains quite good. He is taking atorvastatin and ezetimibe with no apparent side effects  Lab Results   Component Value Date    CHOL 143 05/07/2021    CHLPL 136 01/10/2023    TRIG 75 01/10/2023    HDL 50 01/10/2023    LDL 71 01/10/2023     Essential Hypertension  Home blood pressure readings: not doing. He continues to deny any chest pain, palpitations, dyspnea, orthopnea, paroxysmal nocturnal dyspnea or peripheral edema. Current antihypertensive medications includes lisinopril and amlodipine.  Lab Results   Component Value Date    GLUCOSE 112 (H) 01/10/2023    BUN 19 01/10/2023    CREATININE  0.88 01/10/2023    EGFRRESULT 89.7 01/10/2023    EGFR 95.5 05/31/2022    BCR 21.6 01/10/2023    K 4.4 01/10/2023    CO2 23.7 01/10/2023    CALCIUM 9.3 01/10/2023    PROTENTOTREF 6.6 01/10/2023    ALBUMIN 4.1 01/10/2023    LABIL2 1.6 01/10/2023    AST 23 01/10/2023    ALT 25 01/10/2023     Lab Results   Component Value Date    ALKPHOS 69 01/10/2023    ALKPHOS 64 05/07/2021     BPH  He underwent a TURP greenlight by Dr. Barraza on 6/2/2022.  His course was uncomplicated and his bladder catheter removed 4 days afterward.  He continues to have mild urgency and nocturia x 2, but has noted a continued improvement in his stream and sense of incomplete voiding.    Colonic Polyps  He underwent an updated colonoscopy on 8/17/2022 with removal of 7 tubular adenomatous polyps.  He was advised to have his next study in 3 years    Restless Leg Syndrome  He has a long history of intermittent lower extremity discomfort.  He describes this as a tight ache centered about his knees.  Episodes occur primarily at night.  He admits to occasional knee stiffness but denies any swelling.  He remains on ropinirole but has increased the dose to 4 mg nightly on his own to control his symptoms.  He continues to deny any apparent side effects    Labs  Most recent vitamin D 45.8  Lab Results   Component Value Date    TSH 1.540 01/10/2023     The following portions of the patient's history were reviewed and updated as appropriate: allergies, current medications, past medical history, past social history and problem list.    Review of Systems   Constitutional: Positive for fatigue. Negative for appetite change, chills, diaphoresis, fever and unexpected weight change.   HENT: Positive for rhinorrhea and sneezing. Negative for congestion, ear pain, postnasal drip, sore throat and voice change.    Eyes: Negative for visual disturbance.   Respiratory: Positive for shortness of breath. Negative for cough and wheezing.    Cardiovascular: Negative for  chest pain, palpitations and leg swelling.   Gastrointestinal: Negative for abdominal pain, blood in stool, constipation, diarrhea, nausea and vomiting.   Endocrine: Negative for polydipsia and polyuria.   Genitourinary: Positive for frequency (with nocturia x 1-2) and urgency. Negative for difficulty urinating, dysuria and hematuria.   Musculoskeletal: Positive for arthralgias, back pain and joint swelling. Negative for myalgias.   Skin: Negative for rash.   Neurological: Positive for dizziness and numbness. Negative for tremors, weakness and headaches.   Psychiatric/Behavioral: Positive for confusion. Negative for dysphoric mood and sleep disturbance. The patient is nervous/anxious.      Objective   Physical Exam  Constitutional:       General: He is not in acute distress.     Appearance: Normal appearance. He is well-developed. He is not ill-appearing or diaphoretic.      Comments: Bright and in good spirits. No apparent distress. No pallor, jaundice, diaphoresis, or cyanosis.   HENT:      Head: Atraumatic.      Right Ear: Tympanic membrane, ear canal and external ear normal.      Left Ear: Tympanic membrane, ear canal and external ear normal.      Mouth/Throat:      Lips: No lesions.      Mouth: Mucous membranes are moist. No oral lesions.      Pharynx: No oropharyngeal exudate or posterior oropharyngeal erythema.   Eyes:      Conjunctiva/sclera: Conjunctivae normal.   Neck:      Thyroid: No thyroid mass or thyromegaly.      Vascular: No carotid bruit or JVD.      Trachea: Trachea normal. No tracheal deviation.   Cardiovascular:      Rate and Rhythm: Normal rate and regular rhythm.      Heart sounds: Normal heart sounds, S1 normal and S2 normal. No murmur heard.    No gallop. No S3 or S4 sounds.      Comments: Right maximal calf circumference 1 cm greater than the left.  No calf muscle tenderness  Pulmonary:      Effort: Pulmonary effort is normal.      Breath sounds: Wheezing (diffuse - mild) present.       Comments: Pulmonary hyperinflation  Abdominal:      General: Bowel sounds are normal. There is no distension.      Palpations: Abdomen is soft. There is no hepatomegaly, splenomegaly or mass.      Tenderness: There is no abdominal tenderness.      Hernia: No hernia is present.   Musculoskeletal:      Left knee: No swelling, deformity, effusion, erythema, bony tenderness or crepitus. Tenderness present over the medial joint line and lateral joint line.      Right lower leg: No edema.      Left lower leg: No edema.      Comments: No peripheral joint redness or warmth.   Lymphadenopathy:      Head:      Right side of head: No submental, submandibular, tonsillar, preauricular, posterior auricular or occipital adenopathy.      Left side of head: No submental, submandibular, tonsillar, preauricular, posterior auricular or occipital adenopathy.      Cervical: No cervical adenopathy.      Upper Body:      Right upper body: No supraclavicular adenopathy.      Left upper body: No supraclavicular adenopathy.   Skin:     General: Skin is warm and dry.      Coloration: Skin is not cyanotic, jaundiced or pale.      Findings: No lesion or rash.      Nails: There is no clubbing.   Neurological:      Mental Status: He is alert and oriented to person, place, and time.      Cranial Nerves: No cranial nerve deficit.      Sensory: Sensory deficit (decreased vibration sense toes of both feet) present.      Motor: No tremor.      Coordination: Coordination normal.      Gait: Gait normal.   Psychiatric:         Attention and Perception: Attention normal.         Mood and Affect: Mood normal.         Speech: Speech normal.         Behavior: Behavior normal.         Thought Content: Thought content normal.       Assessment & Plan   Problems Addressed this Visit        Cardiac and Vasculature    Atherosclerosis of aorta (HCC)  Reminded regarding risk factor modification with an emphasis on tobacco cessation.  Continue dual antiplatelet  therapy    Coronary artery calcification seen on CT scan  As above.   Continue current medication.    Essential hypertension   Hypertension: marginal. Evidence of target organ damage: carotid artery stenosis post recent right endarterectomy, coronary artery calcifications and atherosclerosis on previous imaging, bilateral renal artery stenosis, and previous lacunar infarcts.  Encouraged to continue to work on diet and exercise plan.   Continue current medication for now     History of right-sided carotid endarterectomy  As above.   Continue current medication    Mixed hyperlipidemia  As above.   Continue current medication.    Renal artery stenosis (HCC)   With good control of hypertension at present  Continue current medication       Endocrine and Metabolic    Type 2 diabetes mellitus without complication, without long-term current use of insulin (HCC)  Diabetes mellitus Type II, under excellent control.   Encouraged to continue to pursue ADA diet  Encouraged aerobic exercise.  Metformin will be discontinued and empagliflozin titrated to 10 daily.  We will consider titrating this further at his return    Relevant Medications    empagliflozin (Jardiance) 10 MG tablet tablet       Gastrointestinal Abdominal     Gastroesophageal reflux disease without esophagitis    History of colonic polyps  He will be due for his next colonoscopy in 2025       Genitourinary and Reproductive     Benign prostatic hyperplasia with urinary frequency       Health Encounters    Healthcare maintenance  We will discuss an updated Tdap at his return       Mental Health    Anxiety associated with depression       Musculoskeletal and Injuries    Chronic pain of left knee  With probable Baker's cyst  Remote history of open procedures including an apparent ACL repair  Probable underlying osteoarthritis  Plain films of the left hip and knee arranged  Encouraged to report if any worse or if any new symptoms or concerns in the meantime    Relevant  Orders    XR Hip With or Without Pelvis 2 - 3 View Left    XR Knee 3+ View With [place] Left    Mechanical low back pain    Osteopenia of multiple sites       Neuro    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   Continue current medication.    Multiple lacunar infarcts (HCC)  As above.   Continue current medication.    RLS (restless legs syndrome)  Continue current medication  Encouraged to report if any worse or if any new symptoms or concerns.    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  Reminded of the importance of smoking cessation  Encouraged to remain as active as symptoms allow for       Tobacco    Smoker         Diagnoses       Codes Comments    Renal artery stenosis (HCC)    -  Primary ICD-10-CM: I70.1  ICD-9-CM: 440.1     Mixed hyperlipidemia     ICD-10-CM: E78.2  ICD-9-CM: 272.2     History of right-sided carotid endarterectomy     ICD-10-CM: Z98.890  ICD-9-CM: V45.89     Essential hypertension     ICD-10-CM: I10  ICD-9-CM: 401.9     Coronary artery calcification seen on CT scan     ICD-10-CM: I25.10  ICD-9-CM: 414.00     Atherosclerosis of aorta (HCC)     ICD-10-CM: I70.0  ICD-9-CM: 440.0     Type 2 diabetes mellitus without complication, without long-term current use of insulin (HCC)     ICD-10-CM: E11.9  ICD-9-CM: 250.00     Gastroesophageal reflux disease without esophagitis     ICD-10-CM: K21.9  ICD-9-CM: 530.81     Benign prostatic hyperplasia with urinary frequency     ICD-10-CM: N40.1, R35.0  ICD-9-CM: 600.01, 788.41     Healthcare maintenance     ICD-10-CM: Z00.00  ICD-9-CM: V70.0     Anxiety associated with depression     ICD-10-CM: F41.8  ICD-9-CM: 300.4     Osteopenia of multiple sites     ICD-10-CM: M85.89  ICD-9-CM: 733.90     Mechanical low back pain     ICD-10-CM: M54.59  ICD-9-CM: 724.2     RLS (restless legs  "syndrome)     ICD-10-CM: G25.81  ICD-9-CM: 333.94     Multiple lacunar infarcts (HCC)     ICD-10-CM: I63.81  ICD-9-CM: 434.91     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 This time includes time spent by me in the following activities:reviewing tests, performing a medically appropriate examination and/or evaluation , counseling and educating the patient/family/caregiver, ordering medications, tests, or procedures and documenting information in the medical record        "

## 2023-02-25 VITALS
HEIGHT: 68 IN | DIASTOLIC BLOOD PRESSURE: 65 MMHG | HEART RATE: 82 BPM | SYSTOLIC BLOOD PRESSURE: 126 MMHG | WEIGHT: 142 LBS | BODY MASS INDEX: 21.52 KG/M2 | TEMPERATURE: 96.8 F | RESPIRATION RATE: 15 BRPM | OXYGEN SATURATION: 100 %

## 2023-04-22 DIAGNOSIS — M79.605 PAIN IN BOTH LOWER EXTREMITIES: ICD-10-CM

## 2023-04-22 DIAGNOSIS — M79.604 PAIN IN BOTH LOWER EXTREMITIES: ICD-10-CM

## 2023-04-22 RX ORDER — ROPINIROLE 4 MG/1
4 TABLET, FILM COATED ORAL NIGHTLY
Qty: 90 TABLET | Refills: 3 | Status: SHIPPED | OUTPATIENT
Start: 2023-04-22

## 2023-04-26 DIAGNOSIS — M54.59 MECHANICAL LOW BACK PAIN: Primary | ICD-10-CM

## 2023-04-26 DIAGNOSIS — M79.604 PAIN IN BOTH LOWER EXTREMITIES: ICD-10-CM

## 2023-04-26 DIAGNOSIS — M54.17 LUMBOSACRAL RADICULOPATHY AT S1: ICD-10-CM

## 2023-04-26 DIAGNOSIS — M79.605 PAIN IN BOTH LOWER EXTREMITIES: ICD-10-CM

## 2023-04-26 RX ORDER — ROPINIROLE 4 MG/1
TABLET, FILM COATED ORAL
Qty: 180 TABLET | Refills: 3 | Status: SHIPPED | OUTPATIENT
Start: 2023-04-26

## 2023-04-28 ENCOUNTER — HOSPITAL ENCOUNTER (OUTPATIENT)
Dept: GENERAL RADIOLOGY | Facility: HOSPITAL | Age: 78
Discharge: HOME | End: 2023-04-28
Payer: MEDICARE

## 2023-04-28 DIAGNOSIS — G89.29 CHRONIC PAIN OF LEFT KNEE: ICD-10-CM

## 2023-04-28 DIAGNOSIS — M54.59 MECHANICAL LOW BACK PAIN: ICD-10-CM

## 2023-04-28 DIAGNOSIS — M25.562 CHRONIC PAIN OF LEFT KNEE: ICD-10-CM

## 2023-04-28 PROCEDURE — 73502 X-RAY EXAM HIP UNI 2-3 VIEWS: CPT

## 2023-04-28 PROCEDURE — 73562 X-RAY EXAM OF KNEE 3: CPT

## 2023-04-28 PROCEDURE — 72100 X-RAY EXAM L-S SPINE 2/3 VWS: CPT

## 2023-05-02 DIAGNOSIS — K21.9 GASTROESOPHAGEAL REFLUX DISEASE WITHOUT ESOPHAGITIS: Primary | ICD-10-CM

## 2023-05-02 DIAGNOSIS — I63.81 MULTIPLE LACUNAR INFARCTS: ICD-10-CM

## 2023-05-02 DIAGNOSIS — I10 ESSENTIAL HYPERTENSION: ICD-10-CM

## 2023-05-02 DIAGNOSIS — E11.9 TYPE 2 DIABETES MELLITUS WITHOUT COMPLICATION, WITHOUT LONG-TERM CURRENT USE OF INSULIN: ICD-10-CM

## 2023-05-02 RX ORDER — LANSOPRAZOLE 30 MG/1
30 CAPSULE, DELAYED RELEASE ORAL DAILY
Qty: 90 CAPSULE | Refills: 3 | Status: SHIPPED | OUTPATIENT
Start: 2023-05-02

## 2023-05-02 RX ORDER — PEN NEEDLE, DIABETIC 32GX 5/32"
1 NEEDLE, DISPOSABLE MISCELLANEOUS DAILY
Qty: 90 EACH | Refills: 3 | Status: SHIPPED | OUTPATIENT
Start: 2023-05-02

## 2023-05-02 RX ORDER — LEVETIRACETAM 1000 MG/1
1000 TABLET ORAL 2 TIMES DAILY
Qty: 180 TABLET | Refills: 3 | Status: SHIPPED | OUTPATIENT
Start: 2023-05-02

## 2023-05-02 RX ORDER — LISINOPRIL 40 MG/1
40 TABLET ORAL DAILY
Qty: 90 TABLET | Refills: 3 | Status: SHIPPED | OUTPATIENT
Start: 2023-05-02

## 2023-05-11 ENCOUNTER — OFFICE VISIT (OUTPATIENT)
Dept: FAMILY MEDICINE CLINIC | Facility: CLINIC | Age: 78
End: 2023-05-11
Payer: MEDICARE

## 2023-05-11 VITALS
TEMPERATURE: 98.6 F | OXYGEN SATURATION: 100 % | HEART RATE: 88 BPM | WEIGHT: 124 LBS | BODY MASS INDEX: 18.79 KG/M2 | HEIGHT: 68 IN

## 2023-05-11 DIAGNOSIS — G25.81 RLS (RESTLESS LEGS SYNDROME): ICD-10-CM

## 2023-05-11 DIAGNOSIS — R35.0 BENIGN PROSTATIC HYPERPLASIA WITH URINARY FREQUENCY: ICD-10-CM

## 2023-05-11 DIAGNOSIS — K21.9 GASTROESOPHAGEAL REFLUX DISEASE WITHOUT ESOPHAGITIS: ICD-10-CM

## 2023-05-11 DIAGNOSIS — I70.1 RENAL ARTERY STENOSIS: ICD-10-CM

## 2023-05-11 DIAGNOSIS — M54.59 MECHANICAL LOW BACK PAIN: ICD-10-CM

## 2023-05-11 DIAGNOSIS — Z98.890 HISTORY OF RIGHT-SIDED CAROTID ENDARTERECTOMY: ICD-10-CM

## 2023-05-11 DIAGNOSIS — M85.89 OSTEOPENIA OF MULTIPLE SITES: ICD-10-CM

## 2023-05-11 DIAGNOSIS — I25.10 CORONARY ARTERY CALCIFICATION SEEN ON CT SCAN: ICD-10-CM

## 2023-05-11 DIAGNOSIS — N40.1 BENIGN PROSTATIC HYPERPLASIA WITH URINARY FREQUENCY: ICD-10-CM

## 2023-05-11 DIAGNOSIS — Z00.00 HEALTHCARE MAINTENANCE: ICD-10-CM

## 2023-05-11 DIAGNOSIS — I70.0 ATHEROSCLEROSIS OF AORTA: Primary | ICD-10-CM

## 2023-05-11 DIAGNOSIS — G89.29 CHRONIC PAIN OF LEFT KNEE: ICD-10-CM

## 2023-05-11 DIAGNOSIS — E78.2 MIXED HYPERLIPIDEMIA: ICD-10-CM

## 2023-05-11 DIAGNOSIS — E11.9 TYPE 2 DIABETES MELLITUS WITHOUT COMPLICATION, WITHOUT LONG-TERM CURRENT USE OF INSULIN: ICD-10-CM

## 2023-05-11 DIAGNOSIS — Z86.010 HISTORY OF COLONIC POLYPS: ICD-10-CM

## 2023-05-11 DIAGNOSIS — J44.9 MIXED TYPE COPD (CHRONIC OBSTRUCTIVE PULMONARY DISEASE): ICD-10-CM

## 2023-05-11 DIAGNOSIS — I63.421 CEREBRAL INFARCTION DUE TO EMBOLISM OF RIGHT ANTERIOR CEREBRAL ARTERY: ICD-10-CM

## 2023-05-11 DIAGNOSIS — M25.562 CHRONIC PAIN OF LEFT KNEE: ICD-10-CM

## 2023-05-11 DIAGNOSIS — F17.200 SMOKER: ICD-10-CM

## 2023-05-11 DIAGNOSIS — I63.81 MULTIPLE LACUNAR INFARCTS: ICD-10-CM

## 2023-05-11 DIAGNOSIS — I10 ESSENTIAL HYPERTENSION: ICD-10-CM

## 2023-05-11 DIAGNOSIS — F41.8 ANXIETY ASSOCIATED WITH DEPRESSION: ICD-10-CM

## 2023-05-11 LAB
EXPIRATION DATE: NORMAL
HBA1C MFR BLD: 6.4 %
Lab: NORMAL

## 2023-05-11 RX ORDER — TRIAMCINOLONE ACETONIDE 40 MG/ML
40 INJECTION, SUSPENSION INTRA-ARTICULAR; INTRAMUSCULAR ONCE
Status: COMPLETED | OUTPATIENT
Start: 2023-05-11 | End: 2023-05-11

## 2023-05-11 RX ORDER — LIDOCAINE HYDROCHLORIDE 10 MG/ML
1 INJECTION, SOLUTION INFILTRATION; PERINEURAL ONCE
Status: COMPLETED | OUTPATIENT
Start: 2023-05-11 | End: 2023-05-11

## 2023-05-11 RX ADMIN — LIDOCAINE HYDROCHLORIDE 1 ML: 10 INJECTION, SOLUTION INFILTRATION; PERINEURAL at 16:38

## 2023-05-11 RX ADMIN — TRIAMCINOLONE ACETONIDE 40 MG: 40 INJECTION, SUSPENSION INTRA-ARTICULAR; INTRAMUSCULAR at 16:39

## 2023-05-11 NOTE — PROGRESS NOTES
Knee Injection Procedure Note    Pre-operative Diagnosis: Osteoarthritis left knee     Post-operative Diagnosis: Same    Indications: Symptom relief    Anesthesia: Lidocaine 1% without epinephrine     Procedure Details   Verbal consent was obtained for the procedure. The joint was prepped with alcohol and a small wheel of anesthetic was injected into the subcutaneous tissue. A 1-1/2 inch 25 gauge needle was inserted into the superior aspect of the joint from a lateral approach. 1 ml 1% lidocaine and 1 ml of triamcinolone (KENALOG) 40mg/ml was then injected into the joint through the same needle. The needle was removed and the area cleansed and dressed.    Complications:  None; patient tolerated the procedure well.

## 2023-05-11 NOTE — PROGRESS NOTES
Subjective   Torin Granados is a 77 y.o. male.     Chief Complaint  He returns for a scheduled reassessment of multiple medical problems including recent right frontal cerebral infarcts, type 2 diabetes mellitus, hyperlipidemia, essential hypertension, BPH, left knee pain, and restless leg syndrome    History of Present Illness     Right Frontal Cerebral Infarcts  He was admitted to [facility] on 10/28/2022 after an episode of abrupt generalized weakness with a collapse to the ground along with apparently involuntary movements of both arms and legs.  He did not lose consciousness and recalls the entire experience.  Noncontrast CT of the brain on admission was reported as showing moderate atrophy and a an old lacunar infarct of the anterior limb of the right internal capsule.  CTA of the brain and carotids was reported as showing a 95% stenosis of the proximal right ICA, a less than 50% stenosis of the proximal left ICA, and changes consistent with advanced centrilobular emphysema.  He underwent a right carotid endarterectomy on 11/3/2022.  MRI of the brain performed on 11/4/2022 revealed moderate atrophy, chronic microvascular changes, and old right basal ganglia and periventricular lacunar infarcts, and small acute infarcts of the right frontal cortex and subcortical white matter.  Echocardiogram done while in hospital was reported as showing sclerotic aortic valve leaflets but no other significant normalities with an estimated EF of 65%.  Ultrasound of the renal arteries was reported as showing a more than 60% stenosis on the left and a nonsignificant stenosis on the right.  He was discharged on clopidogrel and low-dose ASA along with levetiracetam. Unfortunately he continues to smoke. He denies any history of unilateral weakness, numbness, or tingling and there is no history of any loss of vision in either eye or side nor any difficulty talking or understanding what is said to him.    Type 2 Diabetes Mellitus  He admits  to numbness and tingling of the feet.  He continues to deny any visual disturbances, polydipsia, polyuria, hypoglycemia or foot ulcerations. Evaluation to date has been: hemoglobin A1C. Home sugars: have remained at goal. Current treatments:  empagliflozin, and GLP agonist -liraglutide and basal insulin (together as xultophy -16 units daily).   Lab Results   Component Value Date    HGBA1C 6.4 05/11/2023     Hyperlipidemia  His compliance with treatment remains quite good. He is taking atorvastatin and ezetimibe with no apparent side effects    Essential Hypertension  Home blood pressure readings: not doing. He continues to deny any chest pain, palpitations, dyspnea, orthopnea, paroxysmal nocturnal dyspnea or peripheral edema. Current antihypertensive medications includes lisinopril and amlodipine.    BPH  He underwent a TURP greenlight by Dr. Barraza on 6/2/2022.  His course was uncomplicated and his bladder catheter removed 4 days afterward.  He continues to have mild urgency and nocturia x 2, but has noted a continued improvement in his stream and sense of incomplete voiding.    Left Knee Pain  He has a long history of intermittent left knee pain associated with intermittent posterior swelling. There is no history of any stiffness and he continues to deny any giving way or locking.  He gives a history of several implant procedures on his left knee 1 of which was apparently an ACL repair. He is right-hand dominant.  Plain films of the knee performed on 4/28/2023 were reported as showing hardware associated with a previous ACL repair along with joint space narrowing and chondrocalcinosis worse medially.  Plain films of the left knee were unremarkable    Colonic Polyps  He underwent an updated colonoscopy on 8/17/2022 with removal of 7 tubular adenomatous polyps.  He was advised to have his next study in 3 years    Restless Leg Syndrome  He has a long history of intermittent lower extremity discomfort.  He describes  this as a tight ache centered about his knees.  Episodes occur primarily at night.  He admits to occasional knee stiffness but denies any swelling.  He remains on ropinirole but has increased the dose to 4 mg nightly on his own to control his symptoms.  He continues to deny any apparent side effects    The following portions of the patient's history were reviewed and updated as appropriate: allergies, current medications, past medical history, past social history, past surgical history and problem list.    Review of Systems   Constitutional: Positive for fatigue. Negative for appetite change, chills, diaphoresis, fever and unexpected weight change.   HENT: Positive for rhinorrhea and sneezing. Negative for congestion, ear pain, postnasal drip, sore throat and voice change.    Eyes: Negative for visual disturbance.   Respiratory: Positive for shortness of breath. Negative for cough and wheezing.    Cardiovascular: Negative for chest pain, palpitations and leg swelling.   Gastrointestinal: Negative for abdominal pain, blood in stool, constipation, diarrhea, nausea and vomiting.   Endocrine: Negative for polydipsia and polyuria.   Genitourinary: Positive for frequency (with nocturia x 1-2) and urgency. Negative for difficulty urinating, dysuria and hematuria.   Musculoskeletal: Positive for arthralgias, back pain and joint swelling. Negative for myalgias and neck pain.   Skin: Negative for rash.   Neurological: Positive for dizziness, numbness and headaches. Negative for tremors and weakness.   Psychiatric/Behavioral: Positive for confusion. Negative for dysphoric mood and sleep disturbance. The patient is nervous/anxious.      Objective   Physical Exam  Constitutional:       General: He is not in acute distress.     Appearance: Normal appearance. He is well-developed. He is not ill-appearing or diaphoretic.      Comments: Bright and in good spirits. No apparent distress. No pallor, jaundice, diaphoresis, or cyanosis.    HENT:      Head: Atraumatic.      Right Ear: Tympanic membrane, ear canal and external ear normal.      Left Ear: Tympanic membrane, ear canal and external ear normal.      Mouth/Throat:      Lips: No lesions.      Mouth: Mucous membranes are moist. No oral lesions.      Pharynx: No oropharyngeal exudate or posterior oropharyngeal erythema.   Eyes:      Conjunctiva/sclera: Conjunctivae normal.   Neck:      Thyroid: No thyroid mass or thyromegaly.      Vascular: No carotid bruit or JVD.      Trachea: Trachea normal. No tracheal deviation.   Cardiovascular:      Rate and Rhythm: Normal rate and regular rhythm.      Heart sounds: Normal heart sounds, S1 normal and S2 normal. No murmur heard.    No gallop. No S3 or S4 sounds.   Pulmonary:      Effort: Pulmonary effort is normal.      Breath sounds: Wheezing (diffuse - mild) present.      Comments: Pulmonary hyperinflation  Abdominal:      General: Bowel sounds are normal. There is no distension.   Musculoskeletal:      Left knee: No swelling, deformity, effusion, erythema, bony tenderness or crepitus. Tenderness present over the medial joint line and lateral joint line.      Right lower leg: No edema.      Left lower leg: No edema.      Comments: No peripheral joint redness or warmth.   Lymphadenopathy:      Head:      Right side of head: No submental, submandibular, tonsillar, preauricular, posterior auricular or occipital adenopathy.      Left side of head: No submental, submandibular, tonsillar, preauricular, posterior auricular or occipital adenopathy.      Cervical: No cervical adenopathy.      Upper Body:      Right upper body: No supraclavicular adenopathy.      Left upper body: No supraclavicular adenopathy.   Skin:     General: Skin is warm and dry.      Coloration: Skin is not cyanotic, jaundiced or pale.      Findings: No lesion or rash.      Nails: There is no clubbing.   Neurological:      Mental Status: He is alert and oriented to person, place, and  time.      Cranial Nerves: No cranial nerve deficit.      Sensory: Sensory deficit (decreased vibration sense toes of both feet) present.      Motor: No tremor.      Coordination: Coordination normal.      Gait: Gait normal.   Psychiatric:         Attention and Perception: Attention normal.         Mood and Affect: Mood normal.         Speech: Speech normal.         Behavior: Behavior normal.         Thought Content: Thought content normal.       Assessment & Plan   Problems Addressed this Visit        Cardiac and Vasculature    Atherosclerosis of aorta   Reminded regarding risk factor modification with an emphasis on tobacco cessation.    Coronary artery calcification seen on CT scan  As above.    Essential hypertension   Hypertension: at goal.  Encouraged to continue to work on diet and exercise plan.   Continue current medication    History of right-sided carotid endarterectomy  As above.  Continue dual antiplatelet therapy  We will arrange an updated bilateral carotid ultrasound  Relevant Orders   US carotid bilateral       Mixed hyperlipidemia  As above.   Continue current medication.    Renal artery stenosis  Normotensive today  Will continue to monitor       Endocrine and Metabolic    Type 2 diabetes mellitus without complication, without long-term current use of insulin  Diabetes mellitus Type II, under excellent control.   Encouraged to continue to pursue ADA diet  Encouraged aerobic exercise.  Continue current medication  Scheduled for updated labs in 4-6 weeks      Relevant Orders    POC Glycosylated Hemoglobin (Hb A1C) (Completed)       Gastrointestinal Abdominal     Gastroesophageal reflux disease without esophagitis    History of colonic polyps       Genitourinary and Reproductive     Benign prostatic hyperplasia with urinary frequency       Health Encounters    Healthcare maintenance  Recommended an updated Tdap  We will discuss an updated low-dose CT of the chest at his return       Mental Health     Anxiety associated with depression       Musculoskeletal and Injuries    Chronic pain of left knee  Advised regarding rest, gentle exercise, ice and heat.  Reviewed further options and agreed on a intra-articular corticosteroid injection.  Patient was advised of the potential risks including damage to underlying structures, bleeding, and infection (along with the potential sequela of a septic arthritis) and wished to proceed.  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   Continue current medication.    Multiple lacunar infarcts  As above.   Continue current medication.    RLS (restless legs syndrome)       Pulmonary and Pneumonias    Mixed type COPD (chronic obstructive pulmonary disease)     COPD is stable.  Reminded of the importance of smoking cessation  Encouraged to remain as active as symptoms allow for           Tobacco    Smoker   Diagnoses       Codes Comments    Atherosclerosis of aorta    -  Primary ICD-10-CM: I70.0  ICD-9-CM: 440.0     Essential hypertension     ICD-10-CM: I10  ICD-9-CM: 401.9     Mixed hyperlipidemia     ICD-10-CM: E78.2  ICD-9-CM: 272.2     Coronary artery calcification seen on CT scan     ICD-10-CM: I25.10  ICD-9-CM: 414.00     History of right-sided carotid endarterectomy     ICD-10-CM: Z98.890  ICD-9-CM: V45.89     Renal artery stenosis     ICD-10-CM: I70.1  ICD-9-CM: 440.1     Type 2 diabetes mellitus without complication, without long-term current use of insulin     ICD-10-CM: E11.9  ICD-9-CM: 250.00     Gastroesophageal reflux disease without esophagitis     ICD-10-CM: K21.9  ICD-9-CM: 530.81     History of colonic polyps     ICD-10-CM: Z86.010  ICD-9-CM: V12.72     Benign prostatic hyperplasia with  urinary frequency     ICD-10-CM: N40.1, R35.0  ICD-9-CM: 600.01, 788.41     Anxiety associated with depression     ICD-10-CM: F41.8  ICD-9-CM: 300.4     Mechanical low back pain     ICD-10-CM: M54.59  ICD-9-CM: 724.2     Osteopenia of multiple sites     ICD-10-CM: M85.89  ICD-9-CM: 733.90     Chronic pain of left knee     ICD-10-CM: M25.562, G89.29  ICD-9-CM: 719.46, 338.29     RLS (restless legs syndrome)     ICD-10-CM: G25.81  ICD-9-CM: 333.94     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

## 2023-05-22 ENCOUNTER — HOSPITAL ENCOUNTER (OUTPATIENT)
Dept: CARDIOLOGY | Facility: HOSPITAL | Age: 78
Discharge: HOME | End: 2023-05-22
Admitting: GENERAL PRACTICE
Payer: MEDICARE

## 2023-05-22 DIAGNOSIS — Z98.890 HISTORY OF RIGHT-SIDED CAROTID ENDARTERECTOMY: ICD-10-CM

## 2023-05-22 DIAGNOSIS — I63.421 CEREBRAL INFARCTION DUE TO EMBOLISM OF RIGHT ANTERIOR CEREBRAL ARTERY: ICD-10-CM

## 2023-05-22 PROCEDURE — 93880 EXTRACRANIAL BILAT STUDY: CPT

## 2023-05-24 DIAGNOSIS — G89.29 CHRONIC PAIN OF LEFT KNEE: Primary | ICD-10-CM

## 2023-05-24 DIAGNOSIS — M25.562 CHRONIC PAIN OF LEFT KNEE: Primary | ICD-10-CM

## 2023-05-28 DIAGNOSIS — I10 ESSENTIAL HYPERTENSION: ICD-10-CM

## 2023-05-28 DIAGNOSIS — E78.49 OTHER HYPERLIPIDEMIA: ICD-10-CM

## 2023-05-28 DIAGNOSIS — E11.9 TYPE 2 DIABETES MELLITUS WITHOUT COMPLICATION, WITHOUT LONG-TERM CURRENT USE OF INSULIN: ICD-10-CM

## 2023-05-28 DIAGNOSIS — J44.9 MIXED TYPE COPD (CHRONIC OBSTRUCTIVE PULMONARY DISEASE): ICD-10-CM

## 2023-05-28 DIAGNOSIS — E78.2 MIXED HYPERLIPIDEMIA: ICD-10-CM

## 2023-05-30 DIAGNOSIS — E78.2 MIXED HYPERLIPIDEMIA: ICD-10-CM

## 2023-05-30 DIAGNOSIS — E11.9 TYPE 2 DIABETES MELLITUS WITHOUT COMPLICATION, WITHOUT LONG-TERM CURRENT USE OF INSULIN: ICD-10-CM

## 2023-05-30 DIAGNOSIS — J44.9 MIXED TYPE COPD (CHRONIC OBSTRUCTIVE PULMONARY DISEASE): ICD-10-CM

## 2023-05-30 DIAGNOSIS — E78.49 OTHER HYPERLIPIDEMIA: ICD-10-CM

## 2023-05-30 DIAGNOSIS — I10 ESSENTIAL HYPERTENSION: ICD-10-CM

## 2023-05-30 RX ORDER — AMLODIPINE BESYLATE 10 MG/1
10 TABLET ORAL DAILY
Qty: 90 TABLET | Refills: 3 | Status: CANCELLED | OUTPATIENT
Start: 2023-05-30

## 2023-05-30 RX ORDER — MONTELUKAST SODIUM 10 MG/1
10 TABLET ORAL DAILY
Qty: 90 TABLET | Refills: 3 | Status: CANCELLED | OUTPATIENT
Start: 2023-05-30

## 2023-05-30 RX ORDER — EZETIMIBE 10 MG/1
10 TABLET ORAL DAILY
Qty: 90 TABLET | Refills: 3 | Status: SHIPPED | OUTPATIENT
Start: 2023-05-30

## 2023-05-30 RX ORDER — AMLODIPINE BESYLATE 10 MG/1
10 TABLET ORAL DAILY
Qty: 90 TABLET | Refills: 3 | Status: SHIPPED | OUTPATIENT
Start: 2023-05-30

## 2023-05-30 RX ORDER — ATORVASTATIN CALCIUM 80 MG/1
80 TABLET, FILM COATED ORAL NIGHTLY
Qty: 90 TABLET | Refills: 3 | Status: CANCELLED | OUTPATIENT
Start: 2023-05-30

## 2023-05-30 RX ORDER — MONTELUKAST SODIUM 10 MG/1
10 TABLET ORAL DAILY
Qty: 90 TABLET | Refills: 3 | Status: SHIPPED | OUTPATIENT
Start: 2023-05-30

## 2023-05-30 RX ORDER — ATORVASTATIN CALCIUM 80 MG/1
80 TABLET, FILM COATED ORAL NIGHTLY
Qty: 90 TABLET | Refills: 3 | Status: SHIPPED | OUTPATIENT
Start: 2023-05-30

## 2023-05-30 RX ORDER — EZETIMIBE 10 MG/1
10 TABLET ORAL DAILY
Qty: 90 TABLET | Refills: 3 | Status: CANCELLED | OUTPATIENT
Start: 2023-05-30

## 2023-05-30 NOTE — TELEPHONE ENCOUNTER
"Caller: DarwinTorin \"Marvin\"    Relationship: Self    Best call back number: [phone]    Requested Prescriptions:   Requested Prescriptions     Pending Prescriptions Disp Refills   • ezetimibe (ZETIA) 10 MG tablet 90 tablet 3     Sig: Take 1 tablet by mouth Daily.   • montelukast (SINGULAIR) 10 MG tablet 90 tablet 3     Sig: Take 1 tablet by mouth Daily.   • amLODIPine (NORVASC) 10 MG tablet 90 tablet 3     Sig: Take 1 tablet by mouth Daily.   • atorvastatin (LIPITOR) 80 MG tablet 90 tablet 3     Sig: Take 1 tablet by mouth Every Night.   • empagliflozin (Jardiance) 10 MG tablet tablet 90 tablet 3     Sig: Take 1 tablet by mouth Every Morning.        Pharmacy where request should be sent: [address] [phone] [facility] [phone] [facility]     Last office visit with prescribing clinician: 5/11/2023   Last telemedicine visit with prescribing clinician: Visit date not found   Next office visit with prescribing clinician: 7/27/2023     Additional details provided by patient: PATIENT IS COMPLETELY OUT AND NEEDS ASAP    Does the patient have less than a 3 day supply:  [x] Yes  [] No    Would you like a call back once the refill request has been completed: [x] Yes [] No    If the office needs to give you a call back, can they leave a voicemail: [x] Yes [] No    David Adams Rep   05/30/23 11:23 EDT                 "

## 2023-05-31 DIAGNOSIS — Z98.890 HISTORY OF RIGHT-SIDED CAROTID ENDARTERECTOMY: ICD-10-CM

## 2023-05-31 RX ORDER — CLOPIDOGREL BISULFATE 75 MG/1
75 TABLET ORAL DAILY
Qty: 90 TABLET | Refills: 3 | Status: SHIPPED | OUTPATIENT
Start: 2023-05-31

## 2023-06-08 ENCOUNTER — LAB (OUTPATIENT)
Dept: FAMILY MEDICINE CLINIC | Facility: CLINIC | Age: 78
End: 2023-06-08
Payer: MEDICARE

## 2023-06-08 DIAGNOSIS — I10 ESSENTIAL HYPERTENSION: ICD-10-CM

## 2023-06-08 DIAGNOSIS — I70.0 ATHEROSCLEROSIS OF AORTA: ICD-10-CM

## 2023-06-08 DIAGNOSIS — E78.2 MIXED HYPERLIPIDEMIA: ICD-10-CM

## 2023-06-08 DIAGNOSIS — I63.421 CEREBRAL INFARCTION DUE TO EMBOLISM OF RIGHT ANTERIOR CEREBRAL ARTERY: ICD-10-CM

## 2023-06-08 DIAGNOSIS — E11.9 TYPE 2 DIABETES MELLITUS WITHOUT COMPLICATION, WITHOUT LONG-TERM CURRENT USE OF INSULIN: ICD-10-CM

## 2023-06-08 DIAGNOSIS — I25.10 CORONARY ARTERY CALCIFICATION SEEN ON CT SCAN: ICD-10-CM

## 2023-06-09 DIAGNOSIS — D64.9 NORMOCYTIC ANEMIA: Primary | ICD-10-CM

## 2023-06-09 LAB
ALBUMIN SERPL-MCNC: 4.4 G/DL (ref 3.5–5.2)
ALBUMIN/GLOB SERPL: 2.3 G/DL
ALP SERPL-CCNC: 56 U/L (ref 39–117)
ALT SERPL-CCNC: 14 U/L (ref 1–41)
AST SERPL-CCNC: 18 U/L (ref 1–40)
BASOPHILS # BLD AUTO: 0.02 10*3/MM3 (ref 0–0.2)
BASOPHILS NFR BLD AUTO: 0.3 % (ref 0–1.5)
BILIRUB SERPL-MCNC: <0.2 MG/DL (ref 0–1.2)
BUN SERPL-MCNC: 18 MG/DL (ref 8–23)
BUN/CREAT SERPL: 26.5 (ref 7–25)
CALCIUM SERPL-MCNC: 9.3 MG/DL (ref 8.6–10.5)
CHLORIDE SERPL-SCNC: 104 MMOL/L (ref 98–107)
CHOLEST SERPL-MCNC: 111 MG/DL (ref 0–200)
CO2 SERPL-SCNC: 24.5 MMOL/L (ref 22–29)
CREAT SERPL-MCNC: 0.68 MG/DL (ref 0.76–1.27)
EGFRCR SERPLBLD CKD-EPI 2021: 96.9 ML/MIN/1.73
EOSINOPHIL # BLD AUTO: 0.02 10*3/MM3 (ref 0–0.4)
EOSINOPHIL NFR BLD AUTO: 0.3 % (ref 0.3–6.2)
ERYTHROCYTE [DISTWIDTH] IN BLOOD BY AUTOMATED COUNT: 15.5 % (ref 12.3–15.4)
GLOBULIN SER CALC-MCNC: 1.9 GM/DL
GLUCOSE SERPL-MCNC: 150 MG/DL (ref 65–99)
HBA1C MFR BLD: 6.9 % (ref 4.8–5.6)
HCT VFR BLD AUTO: 30.1 % (ref 37.5–51)
HDLC SERPL-MCNC: 56 MG/DL (ref 40–60)
HGB BLD-MCNC: 9.8 G/DL (ref 13–17.7)
IMM GRANULOCYTES # BLD AUTO: 0.03 10*3/MM3 (ref 0–0.05)
IMM GRANULOCYTES NFR BLD AUTO: 0.4 % (ref 0–0.5)
LDLC SERPL CALC-MCNC: 44 MG/DL (ref 0–100)
LYMPHOCYTES # BLD AUTO: 1.48 10*3/MM3 (ref 0.7–3.1)
LYMPHOCYTES NFR BLD AUTO: 22.2 % (ref 19.6–45.3)
MCH RBC QN AUTO: 27.3 PG (ref 26.6–33)
MCHC RBC AUTO-ENTMCNC: 32.6 G/DL (ref 31.5–35.7)
MCV RBC AUTO: 83.8 FL (ref 79–97)
MICROALBUMIN UR-MCNC: 15.1 UG/ML
MONOCYTES # BLD AUTO: 0.56 10*3/MM3 (ref 0.1–0.9)
MONOCYTES NFR BLD AUTO: 8.4 % (ref 5–12)
NEUTROPHILS # BLD AUTO: 4.56 10*3/MM3 (ref 1.7–7)
NEUTROPHILS NFR BLD AUTO: 68.4 % (ref 42.7–76)
NRBC BLD AUTO-RTO: 0 /100 WBC (ref 0–0.2)
PLATELET # BLD AUTO: 319 10*3/MM3 (ref 140–450)
POTASSIUM SERPL-SCNC: 4.3 MMOL/L (ref 3.5–5.2)
PROT SERPL-MCNC: 6.3 G/DL (ref 6–8.5)
RBC # BLD AUTO: 3.59 10*6/MM3 (ref 4.14–5.8)
SODIUM SERPL-SCNC: 138 MMOL/L (ref 136–145)
TRIGL SERPL-MCNC: 40 MG/DL (ref 0–150)
VLDLC SERPL CALC-MCNC: 11 MG/DL (ref 5–40)
WBC # BLD AUTO: 6.67 10*3/MM3 (ref 3.4–10.8)

## 2023-06-14 DIAGNOSIS — Z87.891 PERSONAL HISTORY OF NICOTINE DEPENDENCE: ICD-10-CM

## 2023-06-14 DIAGNOSIS — Z00.00 HEALTHCARE MAINTENANCE: ICD-10-CM

## 2023-06-14 DIAGNOSIS — F17.200 SMOKER: Primary | ICD-10-CM

## 2023-06-15 ENCOUNTER — CLINICAL SUPPORT (OUTPATIENT)
Dept: FAMILY MEDICINE CLINIC | Facility: CLINIC | Age: 78
End: 2023-06-15
Payer: MEDICARE

## 2023-06-15 DIAGNOSIS — D64.9 NORMOCYTIC ANEMIA: ICD-10-CM

## 2023-06-16 LAB
APPEARANCE UR: CLEAR
BILIRUB UR QL STRIP: NEGATIVE
COLOR UR: YELLOW
GLUCOSE UR QL STRIP: ABNORMAL
HGB UR QL STRIP: NEGATIVE
KETONES UR QL STRIP: NEGATIVE
LEUKOCYTE ESTERASE UR QL STRIP: NEGATIVE
NITRITE UR QL STRIP: NEGATIVE
PH UR STRIP: 7 [PH] (ref 5–8)
PROT UR QL STRIP: NEGATIVE
SP GR UR STRIP: ABNORMAL (ref 1–1.03)
UROBILINOGEN UR STRIP-MCNC: ABNORMAL MG/DL

## 2023-06-17 DIAGNOSIS — I10 ESSENTIAL HYPERTENSION: ICD-10-CM

## 2023-06-17 DIAGNOSIS — I63.81 MULTIPLE LACUNAR INFARCTS: ICD-10-CM

## 2023-06-17 DIAGNOSIS — E78.2 MIXED HYPERLIPIDEMIA: ICD-10-CM

## 2023-06-17 DIAGNOSIS — M79.605 PAIN IN BOTH LOWER EXTREMITIES: ICD-10-CM

## 2023-06-17 DIAGNOSIS — Z98.890 HISTORY OF RIGHT-SIDED CAROTID ENDARTERECTOMY: ICD-10-CM

## 2023-06-17 DIAGNOSIS — E11.9 TYPE 2 DIABETES MELLITUS WITHOUT COMPLICATION, WITHOUT LONG-TERM CURRENT USE OF INSULIN: ICD-10-CM

## 2023-06-17 DIAGNOSIS — I25.10 CORONARY ARTERY CALCIFICATION SEEN ON CT SCAN: ICD-10-CM

## 2023-06-17 DIAGNOSIS — I70.0 ATHEROSCLEROSIS OF AORTA: ICD-10-CM

## 2023-06-17 DIAGNOSIS — K21.9 GASTROESOPHAGEAL REFLUX DISEASE WITHOUT ESOPHAGITIS: ICD-10-CM

## 2023-06-17 DIAGNOSIS — J44.9 MIXED TYPE COPD (CHRONIC OBSTRUCTIVE PULMONARY DISEASE): ICD-10-CM

## 2023-06-17 DIAGNOSIS — E78.49 OTHER HYPERLIPIDEMIA: ICD-10-CM

## 2023-06-17 DIAGNOSIS — M79.604 PAIN IN BOTH LOWER EXTREMITIES: ICD-10-CM

## 2023-06-17 RX ORDER — ROPINIROLE 4 MG/1
TABLET, FILM COATED ORAL
Qty: 180 TABLET | Refills: 3 | Status: SHIPPED | OUTPATIENT
Start: 2023-06-17

## 2023-06-17 RX ORDER — EZETIMIBE 10 MG/1
10 TABLET ORAL DAILY
Qty: 90 TABLET | Refills: 3 | Status: SHIPPED | OUTPATIENT
Start: 2023-06-17

## 2023-06-17 RX ORDER — ATORVASTATIN CALCIUM 80 MG/1
80 TABLET, FILM COATED ORAL NIGHTLY
Qty: 90 TABLET | Refills: 3 | Status: SHIPPED | OUTPATIENT
Start: 2023-06-17

## 2023-06-17 RX ORDER — PEN NEEDLE, DIABETIC 32GX 5/32"
1 NEEDLE, DISPOSABLE MISCELLANEOUS DAILY
Qty: 90 EACH | Refills: 3 | Status: SHIPPED | OUTPATIENT
Start: 2023-06-17

## 2023-06-17 RX ORDER — (INSULIN DEGLUDEC AND LIRAGLUTIDE) 100; 3.6 [IU]/ML; MG/ML
16 INJECTION, SOLUTION SUBCUTANEOUS DAILY
Qty: 30 ML | Refills: 3 | Status: SHIPPED | OUTPATIENT
Start: 2023-06-17

## 2023-06-17 RX ORDER — LEVETIRACETAM 1000 MG/1
1000 TABLET ORAL 2 TIMES DAILY
Qty: 180 TABLET | Refills: 3 | Status: SHIPPED | OUTPATIENT
Start: 2023-06-17

## 2023-06-17 RX ORDER — LANSOPRAZOLE 30 MG/1
30 CAPSULE, DELAYED RELEASE ORAL DAILY
Qty: 90 CAPSULE | Refills: 3 | Status: SHIPPED | OUTPATIENT
Start: 2023-06-17

## 2023-06-17 RX ORDER — ASPIRIN 81 MG/1
81 TABLET ORAL DAILY
Qty: 90 TABLET | Refills: 3 | Status: SHIPPED | OUTPATIENT
Start: 2023-06-17

## 2023-06-17 RX ORDER — LANCETS 30 GAUGE
EACH MISCELLANEOUS
Qty: 300 EACH | Refills: 3 | Status: SHIPPED | OUTPATIENT
Start: 2023-06-17

## 2023-06-17 RX ORDER — AMLODIPINE BESYLATE 10 MG/1
10 TABLET ORAL DAILY
Qty: 90 TABLET | Refills: 3 | Status: SHIPPED | OUTPATIENT
Start: 2023-06-17

## 2023-06-17 RX ORDER — DULOXETIN HYDROCHLORIDE 60 MG/1
120 CAPSULE, DELAYED RELEASE ORAL DAILY
Qty: 180 CAPSULE | Refills: 3 | Status: SHIPPED | OUTPATIENT
Start: 2023-06-17

## 2023-06-17 RX ORDER — CLOPIDOGREL BISULFATE 75 MG/1
75 TABLET ORAL DAILY
Qty: 90 TABLET | Refills: 3 | Status: SHIPPED | OUTPATIENT
Start: 2023-06-17

## 2023-06-17 RX ORDER — LISINOPRIL 40 MG/1
40 TABLET ORAL DAILY
Qty: 90 TABLET | Refills: 3 | Status: SHIPPED | OUTPATIENT
Start: 2023-06-17

## 2023-06-17 RX ORDER — BLOOD SUGAR DIAGNOSTIC
STRIP MISCELLANEOUS
Qty: 200 EACH | Refills: 5 | Status: SHIPPED | OUTPATIENT
Start: 2023-06-17

## 2023-06-17 RX ORDER — MONTELUKAST SODIUM 10 MG/1
10 TABLET ORAL DAILY
Qty: 90 TABLET | Refills: 3 | Status: SHIPPED | OUTPATIENT
Start: 2023-06-17

## 2023-06-19 LAB
BASOPHILS # BLD AUTO: 0 X10E3/UL (ref 0–0.2)
BASOPHILS NFR BLD AUTO: 0 %
EOSINOPHIL # BLD AUTO: 0 X10E3/UL (ref 0–0.4)
EOSINOPHIL NFR BLD AUTO: 0 %
ERYTHROCYTE [DISTWIDTH] IN BLOOD BY AUTOMATED COUNT: 15.4 % (ref 11.6–15.4)
FERRITIN SERPL-MCNC: 10.3 NG/ML (ref 30–400)
FOLATE BLD-MCNC: 410 NG/ML
FOLATE RBC-MCNC: 1340 NG/ML
HCT VFR BLD AUTO: 30.6 % (ref 37.5–51)
HGB BLD-MCNC: 9.6 G/DL (ref 13–17.7)
IMM GRANULOCYTES # BLD AUTO: 0 X10E3/UL (ref 0–0.1)
IMM GRANULOCYTES NFR BLD AUTO: 0 %
IRON SERPL-MCNC: 19 MCG/DL (ref 59–158)
LYMPHOCYTES # BLD AUTO: 1.3 X10E3/UL (ref 0.7–3.1)
LYMPHOCYTES NFR BLD AUTO: 21 %
MCH RBC QN AUTO: 26.4 PG (ref 26.6–33)
MCHC RBC AUTO-ENTMCNC: 31.4 G/DL (ref 31.5–35.7)
MCV RBC AUTO: 84 FL (ref 79–97)
MONOCYTES # BLD AUTO: 0.5 X10E3/UL (ref 0.1–0.9)
MONOCYTES NFR BLD AUTO: 9 %
NEUTROPHILS # BLD AUTO: 4.4 X10E3/UL (ref 1.4–7)
NEUTROPHILS NFR BLD AUTO: 70 %
PATH INTERP BLD-IMP: ABNORMAL
PATH REV BLD -IMP: ABNORMAL
PATHOLOGIST NAME: ABNORMAL
PLATELET # BLD AUTO: 337 X10E3/UL (ref 150–450)
RBC # BLD AUTO: 3.64 X10E6/UL (ref 4.14–5.8)
TRANSFERRIN SERPL-MCNC: 348 MG/DL (ref 177–329)
VIT B12 SERPL-MCNC: 331 PG/ML (ref 211–946)
WBC # BLD AUTO: 6.3 X10E3/UL (ref 3.4–10.8)

## 2023-06-20 PROBLEM — D50.0 IRON DEFICIENCY ANEMIA DUE TO CHRONIC BLOOD LOSS: Status: ACTIVE | Noted: 2023-06-20

## 2023-07-19 NOTE — H&P (VIEW-ONLY)
Patient: Torin Granados    YOB: 1945    Date: 07/20/2023    Primary Care Provider: Jan Dudley MD    Chief Complaint   Patient presents with    Anemia       SUBJECTIVE:    History of present illness: Patient with a long history of anemia going back to childhood.  He has had to take iron supplements and has been given iron transfusions in the past.  He denies any GI bleeding.  He had undergone a colonoscopy last year.    The following portions of the patient's history were reviewed and updated as appropriate: allergies, current medications, past family history, past medical history, past social history, past surgical history and problem list.      Review of Systems   Constitutional:  Negative for chills, fever and unexpected weight change.   HENT:  Negative for trouble swallowing and voice change.    Eyes:  Negative for visual disturbance.   Respiratory:  Negative for apnea, cough, chest tightness, shortness of breath and wheezing.    Cardiovascular:  Negative for chest pain, palpitations and leg swelling.   Gastrointestinal:  Negative for abdominal distention, abdominal pain, anal bleeding, blood in stool, constipation, diarrhea, nausea, rectal pain and vomiting.   Endocrine: Negative for cold intolerance and heat intolerance.   Genitourinary:  Negative for difficulty urinating, dysuria, flank pain, scrotal swelling and testicular pain.   Musculoskeletal:  Negative for back pain, gait problem and joint swelling.   Skin:  Negative for color change, rash and wound.   Neurological:  Negative for dizziness, syncope, speech difficulty, weakness, numbness and headaches.   Hematological:  Negative for adenopathy. Does not bruise/bleed easily.   Psychiatric/Behavioral:  Negative for confusion. The patient is not nervous/anxious.      Allergies:  No Known Allergies    Medications:    Current Outpatient Medications:     amLODIPine (NORVASC) 10 MG tablet, Take 1 tablet by mouth Daily., Disp: 90  tablet, Rfl: 3    aspirin 81 MG EC tablet, Take 1 tablet by mouth Daily., Disp: 90 tablet, Rfl: 3    atorvastatin (LIPITOR) 80 MG tablet, Take 1 tablet by mouth Every Night., Disp: 90 tablet, Rfl: 3    clopidogrel (PLAVIX) 75 MG tablet, Take 1 tablet by mouth Daily., Disp: 90 tablet, Rfl: 3    DULoxetine (CYMBALTA) 60 MG capsule, Take 2 capsules by mouth Daily., Disp: 180 capsule, Rfl: 3    empagliflozin (Jardiance) 10 MG tablet tablet, Take 1 tablet by mouth Every Morning., Disp: 90 tablet, Rfl: 3    ezetimibe (ZETIA) 10 MG tablet, Take 1 tablet by mouth Daily., Disp: 90 tablet, Rfl: 3    ferrous sulfate 325 (65 FE) MG EC tablet, 1 po daily for one week then 1 po twice daily afterward, Disp: 60 tablet, Rfl: 2    glucose blood (Accu-Chek Ana Rosa Plus) test strip, USE TO TEST BLOOD GLUCOSE THREE TIMES A DAY, Disp: 200 each, Rfl: 5    Insulin Degludec-Liraglutide (Xultophy) 100-3.6 UNIT-MG/ML solution pen-injector subcutaneous pen, Inject 16 Units under the skin into the appropriate area as directed Daily., Disp: 30 mL, Rfl: 3    Insulin Pen Needle (BD Pen Needle Courtney U/F) 32G X 4 MM misc, 1 each by Other route Daily. use to inject insulin once daily, Disp: 90 each, Rfl: 3    Lancets misc, 1 three times daily, Disp: 300 each, Rfl: 3    lansoprazole (PREVACID) 30 MG capsule, Take 1 capsule by mouth Daily., Disp: 90 capsule, Rfl: 3    levETIRAcetam (KEPPRA) 1000 MG tablet, Take 1 tablet by mouth 2 (Two) Times a Day., Disp: 180 tablet, Rfl: 3    lisinopril (PRINIVIL,ZESTRIL) 40 MG tablet, Take 1 tablet by mouth Daily., Disp: 90 tablet, Rfl: 3    montelukast (SINGULAIR) 10 MG tablet, Take 1 tablet by mouth Daily., Disp: 90 tablet, Rfl: 3    rOPINIRole (REQUIP) 4 MG tablet, Take 1 tablet by mouth Every Night., Disp: , Rfl:     rotigotine (Neupro) 2 MG/24HR patch, Place 1 patch on the skin as directed by provider Daily., Disp: 30 patch, Rfl: 5    vitamin C (ASCORBIC ACID) 500 MG tablet, 1 po with each dose of ferrous sulfate,  "Disp: 60 tablet, Rfl: 2    History:  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    Colon polyp     COPD (chronic obstructive pulmonary disease)     Coronary artery calcification seen on CT scan 06/08/2017    Deep vein thrombosis 2022    Depression     Diabetes mellitus     Enlarged prostate     Hyperlipidemia     Hypertension     Iron deficiency anemia due to chronic blood loss 06/20/2023    Seizures 2020    Stroke 2020    Substance abuse recovering    Ulcer        Past Surgical History:   Procedure Laterality Date    BRAIN SURGERY  less than year    COLONOSCOPY      over 5years ago    COLONOSCOPY N/A 03/29/2017    Procedure: Colonoscopy  CPTCODE: 13343;  Surgeon: Rudy Velez III, MD;  Location: [facility] OR;  Service:     COLONOSCOPY N/A 08/17/2022    Procedure: COLONOSCOPY with polypectomy x 8;  Surgeon: Abad Agudelo MD;  Location: [facility] ENDOSCOPY;  Service: Gastroenterology;  Laterality: N/A;    CYSTOSCOPY TRANSURETHRAL RESECTION OF PROSTATE N/A 06/02/2022    Procedure: CYSTOSCOPY TRANSURETHRAL RESECTION OF PROSTATE GREENLIGHT;  Surgeon: Greg Barraza MD;  Location: [facility] OR;  Service: Urology;  Laterality: N/A;    EYE SURGERY  long time ago    FRACTURE SURGERY      KNEE SURGERY Left     \"3-4 surgeries on left knee\" per pt    SKIN BIOPSY      melanoma on face    VASECTOMY  1970       Family History   Problem Relation Age of Onset    Diabetes Mother     Heart disease Mother     Stroke Mother     Hypertension Mother     Diabetes Father     Heart disease Father        Social History     Tobacco Use    Smoking status: Some Days     Packs/day: 1.00     Years: 15.00     Pack years: 15.00     Types: Cigarettes     Start date: 1/1/2019     Passive exposure: Current    " "Smokeless tobacco: Current     Types: Snuff   Vaping Use    Vaping Use: Never used   Substance Use Topics    Alcohol use: Not Currently     Comment: recoveringn alcoholic, no drink in 25 yrs    Drug use: No        OBJECTIVE:    Vital Signs:   Vitals:    07/20/23 1345   BP: 108/52   BP Location: Left arm   Patient Position: Sitting   Cuff Size: Adult   Pulse: 77   Temp: 97.5 øF (36.4 øC)   SpO2: 96%   Weight: 54.8 kg (120 lb 12.8 oz)   Height: 172.7 cm (67.99\")       Physical Exam:   General Appearance:    Alert, cooperative, in no acute distress   Head:    Normocephalic, without obvious abnormality, atraumatic   Eyes:            Normal.  No scleral icterus.  PERRLA    Lungs:     Clear to auscultation,respirations regular, even and                  unlabored    Heart:    Regular rhythm and normal rate, normal S1 and S2, no            murmur   Abdomen:     Normal bowel sounds, no masses, no organomegaly, soft        non-tender, non-distended, no guarding,    Extremities:   Moves all extremities well, no edema, no cyanosis, no             redness   Skin:   No bleeding, bruising or rash   Neurologic:   Normal without gross deficits.   Psychiatric: No evidence of depression or anxiety        Results Review:   I reviewed the patient's new clinical results.  Labs were reviewed    Review of Systems was reviewed and confirmed as accurate as documented by the MA.    ASSESSMENT/PLAN:    1. Iron deficiency anemia, unspecified iron deficiency anemia type        Chronic iron deficiency anemia.  No evidence of GI bleeding.  Hemoccult will be ordered.  Colonoscopy last year was reviewed.  No explanation on colonoscopy for iron deficiency.  This sounds like a genetic issue rather than GI bleeding however I offered him an EGD.  I explained the procedure to the patient as well as the risks of bleeding and perforation and they understand the ramifications of these potential complications and they wish to proceed.        Electronically " signed by Primo Navarro MD  07/20/23

## 2023-07-27 ENCOUNTER — OFFICE VISIT (OUTPATIENT)
Dept: FAMILY MEDICINE CLINIC | Facility: CLINIC | Age: 78
End: 2023-07-27
Payer: MEDICARE

## 2023-07-27 VITALS
WEIGHT: 121 LBS | OXYGEN SATURATION: 99 % | RESPIRATION RATE: 15 BRPM | TEMPERATURE: 98.6 F | SYSTOLIC BLOOD PRESSURE: 125 MMHG | BODY MASS INDEX: 18.34 KG/M2 | HEART RATE: 89 BPM | DIASTOLIC BLOOD PRESSURE: 62 MMHG | HEIGHT: 68 IN

## 2023-07-27 DIAGNOSIS — I63.81 MULTIPLE LACUNAR INFARCTS: ICD-10-CM

## 2023-07-27 DIAGNOSIS — I70.0 ATHEROSCLEROSIS OF AORTA: Primary | ICD-10-CM

## 2023-07-27 DIAGNOSIS — R35.0 BENIGN PROSTATIC HYPERPLASIA WITH URINARY FREQUENCY: ICD-10-CM

## 2023-07-27 DIAGNOSIS — I63.421 CEREBRAL INFARCTION DUE TO EMBOLISM OF RIGHT ANTERIOR CEREBRAL ARTERY: ICD-10-CM

## 2023-07-27 DIAGNOSIS — M54.59 MECHANICAL LOW BACK PAIN: ICD-10-CM

## 2023-07-27 DIAGNOSIS — M25.562 CHRONIC PAIN OF LEFT KNEE: ICD-10-CM

## 2023-07-27 DIAGNOSIS — Z98.890 HISTORY OF RIGHT-SIDED CAROTID ENDARTERECTOMY: ICD-10-CM

## 2023-07-27 DIAGNOSIS — E11.9 TYPE 2 DIABETES MELLITUS WITHOUT COMPLICATION, WITHOUT LONG-TERM CURRENT USE OF INSULIN: ICD-10-CM

## 2023-07-27 DIAGNOSIS — G25.81 RLS (RESTLESS LEGS SYNDROME): ICD-10-CM

## 2023-07-27 DIAGNOSIS — J30.9 CHRONIC ALLERGIC RHINITIS: ICD-10-CM

## 2023-07-27 DIAGNOSIS — Z00.00 HEALTHCARE MAINTENANCE: ICD-10-CM

## 2023-07-27 DIAGNOSIS — M85.89 OSTEOPENIA OF MULTIPLE SITES: ICD-10-CM

## 2023-07-27 DIAGNOSIS — K21.9 GASTROESOPHAGEAL REFLUX DISEASE WITHOUT ESOPHAGITIS: ICD-10-CM

## 2023-07-27 DIAGNOSIS — I25.10 CORONARY ARTERY CALCIFICATION SEEN ON CT SCAN: ICD-10-CM

## 2023-07-27 DIAGNOSIS — D50.0 IRON DEFICIENCY ANEMIA DUE TO CHRONIC BLOOD LOSS: ICD-10-CM

## 2023-07-27 DIAGNOSIS — I70.1 RENAL ARTERY STENOSIS: ICD-10-CM

## 2023-07-27 DIAGNOSIS — I10 ESSENTIAL HYPERTENSION: ICD-10-CM

## 2023-07-27 DIAGNOSIS — F41.8 ANXIETY ASSOCIATED WITH DEPRESSION: ICD-10-CM

## 2023-07-27 DIAGNOSIS — J44.9 MIXED TYPE COPD (CHRONIC OBSTRUCTIVE PULMONARY DISEASE): ICD-10-CM

## 2023-07-27 DIAGNOSIS — F17.200 SMOKER: ICD-10-CM

## 2023-07-27 DIAGNOSIS — N40.1 BENIGN PROSTATIC HYPERPLASIA WITH URINARY FREQUENCY: ICD-10-CM

## 2023-07-27 DIAGNOSIS — E78.2 MIXED HYPERLIPIDEMIA: ICD-10-CM

## 2023-07-27 DIAGNOSIS — G89.29 CHRONIC PAIN OF LEFT KNEE: ICD-10-CM

## 2023-07-27 RX ORDER — GABAPENTIN 100 MG/1
CAPSULE ORAL
Qty: 60 CAPSULE | Refills: 2 | Status: SHIPPED | OUTPATIENT
Start: 2023-07-27

## 2023-07-27 RX ORDER — ROPINIROLE 4 MG/1
TABLET, FILM COATED ORAL
Qty: 60 TABLET | Refills: 2 | Status: SHIPPED | OUTPATIENT
Start: 2023-07-27

## 2023-07-27 RX ORDER — FLUTICASONE PROPIONATE 50 MCG
2 SPRAY, SUSPENSION (ML) NASAL DAILY
Qty: 16 G | Refills: 5 | Status: SHIPPED | OUTPATIENT
Start: 2023-07-27

## 2023-07-27 NOTE — PROGRESS NOTES
Subjective   oTrin Granados is a 77 y.o. male.     Chief Complaint  He returns for a scheduled reassessment of multiple medical problems including previous right frontal cerebral infarcts, type 2 diabetes mellitus, hyperlipidemia, essential hypertension, benign prostatic hypertrophy, left knee pain, restless leg syndrome, and iron deficiency anemia    Back Pain  The current episode started more than 1 year ago. The problem occurs 2 to 4 times per day. The problem is unchanged. The pain is present in the sacro-iliac. The quality of the pain is described as aching. The pain is at a severity of 1/10. The pain is Worse during the day. The symptoms are aggravated by bending. Stiffness is present All day. Associated symptoms include dysuria, headaches, leg pain and numbness. Pertinent negatives include no abdominal pain, bladder incontinence, bowel incontinence, chest pain, fever, paresis, paresthesias, pelvic pain, perianal numbness, tingling, weakness or weight loss.      Right Frontal Cerebral Infarcts  He was admitted to [facility] on 10/28/2022 after an episode of abrupt generalized weakness with a collapse to the ground along with apparently involuntary movements of both arms and legs.  He did not lose consciousness and recalls the entire experience.  Noncontrast CT of the brain on admission was reported as showing moderate atrophy and a an old lacunar infarct of the anterior limb of the right internal capsule.  CTA of the brain and carotids was reported as showing a 95% stenosis of the proximal right ICA, a less than 50% stenosis of the proximal left ICA, and changes consistent with advanced centrilobular emphysema.  He underwent a right carotid endarterectomy on 11/3/2022.  MRI of the brain performed on 11/4/2022 revealed moderate atrophy, chronic microvascular changes, and old right basal ganglia and periventricular lacunar infarcts, and small acute infarcts of the right frontal cortex and subcortical white matter.   Echocardiogram done while in hospital was reported as showing sclerotic aortic valve leaflets but no other significant normalities with an estimated EF of 65%.  Ultrasound of the renal arteries was reported as showing a more than 60% stenosis on the left and a nonsignificant stenosis on the right.  He remains on clopidogrel and low-dose ASA along with levetiracetam. Unfortunately he continues to smoke. He continues to deny any unilateral weakness, numbness, or tingling and there is no history of any loss of vision in either eye or side nor any difficulty talking or understanding what is said to him.  Updated ultrasound of the carotid arteries performed on 5/22/2023 revealed mild plaque but no significant stenosis  Lab Results   Component Value Date    WBC 7.91 07/17/2023    HGB 12.6 (L) 07/17/2023    HCT 40.7 07/17/2023    MCV 89.6 07/17/2023     07/17/2023     Lab Results   Component Value Date    FERRITIN 10.30 (L) 06/15/2023     Type 2 Diabetes Mellitus  He admits to numbness and tingling of the feet.  He continues to deny any visual disturbances, polydipsia, polyuria, hypoglycemia or foot ulcerations.  He is following an appropriate diet and exercise plan and remains on empagliflozin, GLP agonist -liraglutide and basal insulin (together as xultophy -16 units daily).   Lab Results   Component Value Date    HGBA1C 6.90 (H) 06/08/2023     Lab Results   Component Value Date    OFHNUHXS89 331 06/15/2023     Lab Results   Component Value Date    MICROALBUR 15.1 06/08/2023     Hyperlipidemia  He remains on atorvastatin and ezetimibe with no apparent side effects  Lab Results   Component Value Date    CHOL 143 05/07/2021    CHLPL 111 06/08/2023    TRIG 40 06/08/2023    HDL 56 06/08/2023    LDL 44 06/08/2023     Essential Hypertension  He continues to deny any chest pain, palpitations, dyspnea, orthopnea, paroxysmal nocturnal dyspnea or peripheral edema. Current antihypertensive medications includes lisinopril and  amlodipine.  Lab Results   Component Value Date    GLUCOSE 150 (H) 06/08/2023    BUN 18 06/08/2023    CREATININE 0.68 (L) 06/08/2023    EGFRRESULT 96.9 06/08/2023    EGFR 95.5 05/31/2022    BCR 26.5 (H) 06/08/2023    K 4.3 06/08/2023    CO2 24.5 06/08/2023    CALCIUM 9.3 06/08/2023    PROTENTOTREF 6.3 06/08/2023    ALBUMIN 4.4 06/08/2023    BILITOT <0.2 06/08/2023    AST 18 06/08/2023    ALT 14 06/08/2023     Lab Results   Component Value Date    ALKPHOS 56 06/08/2023    ALKPHOS 64 05/07/2021     BPH  He underwent a TURP greenlight by Dr. Barraza on 6/2/2022.  His course was uncomplicated and his bladder catheter removed 4 days afterward.  He complains of increasing frequency, nocturia, and a sense of incomplete voiding with occasional dysuria.  He denies any hematuria    Left Knee Pain  He has a long history of intermittent left knee pain associated with intermittent posterior swelling. There is no history of any stiffness and he continues to deny any giving way or locking.  He gives a history of several implant procedures on his left knee 1 of which was apparently an ACL repair. He is right-hand dominant.  Plain films of the knee performed on 4/28/2023 were reported as showing hardware associated with a previous ACL repair along with joint space narrowing and chondrocalcinosis worse medially.  Ultrasound of the left knee performed on 6/22/2023 was reported as showing a small joint effusion and posterior cystic fluid consistent with a popliteal cyst    Restless Leg Syndrome  He has a long history of intermittent lower extremity discomfort.  He describes this as a tight ache centered about his knees.  Episodes occur primarily at night.  He admits to occasional knee stiffness but denies any swelling.  He remains on ropinirole but has increased the dose to 4 mg nightly on his own to control his symptoms.  He continues to deny any apparent side effects    Iron Deficiency Anemia  He is on iron supplementation with no  apparent side effects.  He underwent an updated colonoscopy on 8/17/2022 with removal of 7 tubular adenomatous polyps.  He was advised to have his next study in 3 years he is scheduled to undergo an EGD by Dr. Navarro on 8/15/2023    The following portions of the patient's history were reviewed and updated as appropriate: allergies, current medications, past medical history, past social history, and problem list.    Review of Systems   Constitutional:  Positive for fatigue. Negative for appetite change, chills, diaphoresis, fever, unexpected weight change and weight loss.   HENT:  Positive for congestion, postnasal drip, rhinorrhea and sneezing (all worse through summer). Negative for ear pain, sore throat and voice change.    Eyes:  Negative for visual disturbance.   Respiratory:  Positive for shortness of breath. Negative for cough and wheezing.    Cardiovascular:  Negative for chest pain, palpitations and leg swelling.   Gastrointestinal:  Negative for abdominal pain, blood in stool, bowel incontinence, constipation, diarrhea, nausea and vomiting.   Endocrine: Negative for polydipsia and polyuria.   Genitourinary:  Positive for dysuria, frequency (with nocturia) and urgency. Negative for bladder incontinence, difficulty urinating, hematuria and pelvic pain.   Musculoskeletal:  Positive for arthralgias, back pain, joint swelling and myalgias. Negative for neck pain.   Skin:  Negative for rash.   Neurological:  Positive for dizziness, numbness and headaches. Negative for tingling, tremors, weakness and paresthesias.   Psychiatric/Behavioral:  Positive for confusion. Negative for dysphoric mood and sleep disturbance. The patient is nervous/anxious.      Objective   Physical Exam  Constitutional:       General: He is not in acute distress.     Appearance: Normal appearance. He is well-developed. He is not ill-appearing or diaphoretic.      Comments: Bright and in good spirits. No apparent distress. No pallor,  jaundice, diaphoresis, or cyanosis.   HENT:      Head: Atraumatic.      Right Ear: Tympanic membrane, ear canal and external ear normal.      Left Ear: Tympanic membrane, ear canal and external ear normal.   Eyes:      Conjunctiva/sclera: Conjunctivae normal.   Neck:      Thyroid: No thyroid mass or thyromegaly.      Vascular: No carotid bruit or JVD.      Trachea: Trachea normal. No tracheal deviation.   Cardiovascular:      Rate and Rhythm: Normal rate and regular rhythm.      Heart sounds: Normal heart sounds, S1 normal and S2 normal. No murmur heard.    No gallop. No S3 or S4 sounds.   Pulmonary:      Effort: Pulmonary effort is normal.      Breath sounds: Wheezing (diffuse - mild) present.      Comments: Pulmonary hyperinflation  Abdominal:      General: Bowel sounds are normal. There is no distension.   Musculoskeletal:      Left knee: No swelling, deformity, effusion, erythema, bony tenderness or crepitus. Tenderness present over the medial joint line and lateral joint line.      Right lower leg: No edema.      Left lower leg: No edema.      Comments: No peripheral joint redness or warmth.   Lymphadenopathy:      Head:      Right side of head: No submental, submandibular, tonsillar, preauricular, posterior auricular or occipital adenopathy.      Left side of head: No submental, submandibular, tonsillar, preauricular, posterior auricular or occipital adenopathy.      Cervical: No cervical adenopathy.      Upper Body:      Right upper body: No supraclavicular adenopathy.      Left upper body: No supraclavicular adenopathy.   Skin:     General: Skin is warm and dry.      Coloration: Skin is not cyanotic, jaundiced or pale.      Findings: No lesion or rash.      Nails: There is no clubbing.   Neurological:      Mental Status: He is alert and oriented to person, place, and time.      Cranial Nerves: No cranial nerve deficit.      Sensory: Sensory deficit (decreased vibration sense toes of both feet) present.       Motor: No tremor.      Coordination: Coordination normal.      Gait: Gait normal.   Psychiatric:         Attention and Perception: Attention normal.         Mood and Affect: Mood normal.         Speech: Speech normal.         Behavior: Behavior normal.         Thought Content: Thought content normal.     Assessment & Plan   Problems Addressed this Visit          Allergies and Adverse Reactions    Chronic allergic rhinitis  Reminded regarding allergen avoidance.  Reviewed options going forward and agreed on a trial of a nasal corticosteroid    Relevant Medications    fluticasone (FLONASE) 50 MCG/ACT nasal spray       Cardiac and Vasculature    Atherosclerosis of aorta   Reminded regarding risk factor modification with an emphasis on tobacco cessation.  Continue dual antiplatelet therapy    Coronary artery calcification seen on CT scan  As above.    Essential hypertension   Hypertension: at goal. Evidence of target organ damage: stroke and carotid artery disease post right endarterectomy as well as coronary artery calcifications and atherosclerosis on previous imaging .  Encouraged to continue to work on diet and exercise plan.   Continue current medication    History of right-sided carotid endarterectomy  As above.    Mixed hyperlipidemia  As above.   Continue current medication.    Renal artery stenosis  As above.       Endocrine and Metabolic    Type 2 diabetes mellitus without complication, without long-term current use of insulin  Diabetes mellitus Type II, under excellent control.   Encouraged to continue to pursue ADA diet  Encouraged aerobic exercise.  Continue current medication.  Updated labs will be drawn at his return.       Gastrointestinal Abdominal     Gastroesophageal reflux disease without esophagitis       Genitourinary and Reproductive     Benign prostatic hyperplasia with urinary frequency  We will arrange urology follow-up    Relevant Orders    Ambulatory Referral to Urology       [facility]  Encounters    Healthcare maintenance  Reminded to get a flu shot when available.  We will reschedule low-dose CT of the chest       Hematology and Neoplasia    Iron deficiency anemia due to chronic blood loss  Continue supplementation with monitoring.       Mental Health    Anxiety associated with depression       Musculoskeletal and Injuries    Chronic pain of left knee  Reminded regarding symptomatic treatment.     Mechanical low back pain  As above.    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    Multiple lacunar infarcts  As above.    RLS (restless legs syndrome)  Lengthy discussion regarding the risk of augmentation with ropinirole  Reviewed options and agreed on a trial of gabapentin gradual titration as needed/tolerated with eventual tapering and discontinuation of ropinirole    Relevant Medications    gabapentin (NEURONTIN) 100 MG capsule       Pulmonary and Pneumonias    Mixed type COPD (chronic obstructive pulmonary disease)   COPD is stable.  Reminded of the importance of smoking cessation  Encouraged to remain as active as symptoms allow for    Relevant Medications    fluticasone (FLONASE) 50 MCG/ACT nasal spray       Tobacco    Smoker     Diagnoses         Codes Comments    Atherosclerosis of aorta    -  Primary ICD-10-CM: I70.0  ICD-9-CM: 440.0     Essential hypertension     ICD-10-CM: I10  ICD-9-CM: 401.9     Mixed hyperlipidemia     ICD-10-CM: E78.2  ICD-9-CM: 272.2     Coronary artery calcification seen on CT scan     ICD-10-CM: I25.10  ICD-9-CM: 414.00     History of right-sided carotid endarterectomy     ICD-10-CM: Z98.890  ICD-9-CM: V45.89     Renal artery stenosis     ICD-10-CM: I70.1  ICD-9-CM: 440.1     Type 2 diabetes mellitus without complication, without long-term current use of insulin     ICD-10-CM: E11.9  ICD-9-CM: 250.00     Gastroesophageal reflux  "disease without esophagitis     ICD-10-CM: K21.9  ICD-9-CM: 530.81     Benign prostatic hyperplasia with urinary frequency     ICD-10-CM: N40.1, R35.0  ICD-9-CM: 600.01, 788.41     Healthcare maintenance     ICD-10-CM: Z00.00  ICD-9-CM: V70.0     Iron deficiency anemia due to chronic blood loss     ICD-10-CM: D50.0  ICD-9-CM: 280.0     Anxiety associated with depression     ICD-10-CM: F41.8  ICD-9-CM: 300.4     Mechanical low back pain     ICD-10-CM: M54.59  ICD-9-CM: 724.2     Osteopenia of multiple sites     ICD-10-CM: M85.89  ICD-9-CM: 733.90     Chronic pain of left knee     ICD-10-CM: M25.562, G89.29  ICD-9-CM: 719.46, 338.29     RLS (restless legs syndrome)     ICD-10-CM: G25.81  ICD-9-CM: 333.94     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 This time includes time spent by me in the following activities:reviewing tests, performing a medically appropriate examination and/or evaluation , counseling and educating the patient/family/caregiver, ordering medications, tests, or procedures and documenting information in the medical record              "

## 2023-08-03 ENCOUNTER — E-VISIT (OUTPATIENT)
Dept: ADMINISTRATIVE | Facility: OTHER | Age: 78
End: 2023-08-03
Payer: MEDICARE

## 2023-08-04 ENCOUNTER — OFFICE VISIT (OUTPATIENT)
Dept: UROLOGY | Facility: CLINIC | Age: 78
End: 2023-08-04
Payer: MEDICARE

## 2023-08-04 VITALS
SYSTOLIC BLOOD PRESSURE: 165 MMHG | BODY MASS INDEX: 18.76 KG/M2 | DIASTOLIC BLOOD PRESSURE: 83 MMHG | WEIGHT: 123.8 LBS | HEIGHT: 68 IN | HEART RATE: 78 BPM

## 2023-08-04 DIAGNOSIS — N40.1 BENIGN PROSTATIC HYPERPLASIA WITH URINARY FREQUENCY: Primary | ICD-10-CM

## 2023-08-04 DIAGNOSIS — R39.14 BENIGN PROSTATIC HYPERPLASIA WITH INCOMPLETE BLADDER EMPTYING: ICD-10-CM

## 2023-08-04 DIAGNOSIS — N40.1 BENIGN PROSTATIC HYPERPLASIA WITH INCOMPLETE BLADDER EMPTYING: ICD-10-CM

## 2023-08-04 DIAGNOSIS — N40.0 BPH WITHOUT OBSTRUCTION/LOWER URINARY TRACT SYMPTOMS: ICD-10-CM

## 2023-08-04 DIAGNOSIS — R35.0 BENIGN PROSTATIC HYPERPLASIA WITH URINARY FREQUENCY: Primary | ICD-10-CM

## 2023-08-04 LAB
BILIRUB BLD-MCNC: NEGATIVE MG/DL
CLARITY, POC: CLEAR
COLOR UR: YELLOW
EXPIRATION DATE: NORMAL
GLUCOSE UR STRIP-MCNC: NEGATIVE MG/DL
KETONES UR QL: NEGATIVE
LEUKOCYTE EST, POC: NEGATIVE
Lab: NORMAL
NITRITE UR-MCNC: NEGATIVE MG/ML
PH UR: 5 [PH] (ref 5–8)
PROT UR STRIP-MCNC: NEGATIVE MG/DL
RBC # UR STRIP: NEGATIVE /UL
SP GR UR: 1.01 (ref 1–1.03)
UROBILINOGEN UR QL: NORMAL

## 2023-08-04 PROCEDURE — 87086 URINE CULTURE/COLONY COUNT: CPT | Performed by: NURSE PRACTITIONER

## 2023-08-04 RX ORDER — TAMSULOSIN HYDROCHLORIDE 0.4 MG/1
1 CAPSULE ORAL DAILY
Qty: 90 CAPSULE | Refills: 5 | Status: SHIPPED | OUTPATIENT
Start: 2023-08-04

## 2023-08-07 LAB
BACTERIA UR CULT: NO GROWTH
BACTERIA UR CULT: NORMAL
PSA FREE MFR SERPL: 40 %
PSA FREE SERPL-MCNC: 0.04 NG/ML
PSA SERPL-MCNC: 0.1 NG/ML (ref 0–4)

## 2023-08-09 ENCOUNTER — TELEPHONE (OUTPATIENT)
Dept: UROLOGY | Facility: CLINIC | Age: 78
End: 2023-08-09
Payer: MEDICARE

## 2023-08-09 NOTE — TELEPHONE ENCOUNTER
I called and let the pt know that his PSA was unchanged from last year and was still in a normal range    ----- Message from Griselda Cheng-Akwa, APRN sent at 8/8/2023  6:30 PM EDT -----  Please kindly let patient know his PSA is unremarkable at 0.1 with a free PSA of 40.0%.  He should follow-up with Dr. Tenorio in October as discussed.  May return sooner if need be.    Thank you

## 2023-08-10 ENCOUNTER — TELEPHONE (OUTPATIENT)
Dept: SURGERY | Facility: CLINIC | Age: 78
End: 2023-08-10
Payer: MEDICARE

## 2023-08-10 NOTE — PRE-PROCEDURE INSTRUCTIONS
PAT phone history completed with pt for upcoming procedure on 8/15/23, with Dr. Navarro.     PAT PASS GIVEN/REVIEWED WITH PT.  VERBALIZED UNDERSTANDING OF THE FOLLOWING:  DO NOT EAT, DRINK, SMOKE, USE SMOKELESS TOBACCO OR CHEW GUM AFTER MIDNIGHT THE NIGHT BEFORE SURGERY.  THIS ALSO INCLUDES HARD CANDIES AND MINTS.    DO NOT SHAVE THE AREA TO BE OPERATED ON AT LEAST 48 HOURS PRIOR TO THE PROCEDURE.  DO NOT WEAR MAKE UP OR NAIL POLISH.  DO NOT LEAVE IN ANY PIERCING OR WEAR JEWELRY THE DAY OF SURGERY.      DO NOT USE ADHESIVES IF YOU WEAR DENTURES.    DO NOT WEAR EYE CONTACTS; BRING IN YOUR GLASSES.    ONLY TAKE MEDICATION THE MORNING OF YOUR PROCEDURE IF INSTRUCTED BY YOUR SURGEON WITH ENOUGH WATER TO SWALLOW THE MEDICATION.  IF YOUR SURGEON DID NOT SPECIFY WHICH MEDICATIONS TO TAKE, YOU WILL NEED TO CALL THEIR OFFICE FOR FURTHER INSTRUCTIONS AND DO AS THEY INSTRUCT.    LEAVE ANYTHING YOU CONSIDER VALUABLE AT HOME.    YOU WILL NEED TO ARRANGE FOR SOMEONE TO DRIVE YOU HOME AFTER SURGERY.  IT IS RECOMMENDED THAT YOU DO NOT DRIVE, WORK, DRINK ALCOHOL OR MAKE MAJOR DECISIONS FOR AT LEAST 24 HOURS AFTER YOUR PROCEDURE IS COMPLETE.      THE DAY OF YOUR PROCEDURE, BRING IN THE FOLLOWING IF APPLICABLE:   PICTURE ID AND INSURANCE/MEDICARE OR MEDICAID CARDS/ANY CO-PAY THAT MAY BE DUE   COPY OF ADVANCED DIRECTIVE/LIVING WILL/POWER OR    CPAP/BIPAP/INHALERS   SKIN PREP SHEET   YOUR PREADMISSION TESTING PASS (IF NOT A PHONE HISTORY)

## 2023-08-10 NOTE — TELEPHONE ENCOUNTER
Spoke with Torin to confirm the procedure on 08/15/2023 @ [facility]

## 2023-08-15 ENCOUNTER — HOSPITAL ENCOUNTER (OUTPATIENT)
Facility: HOSPITAL | Age: 78
Setting detail: HOSPITAL OUTPATIENT SURGERY
Discharge: HOME | End: 2023-08-15
Attending: SURGERY | Admitting: SURGERY
Payer: MEDICARE

## 2023-08-15 ENCOUNTER — ANESTHESIA EVENT (OUTPATIENT)
Dept: GASTROENTEROLOGY | Facility: HOSPITAL | Age: 78
End: 2023-08-15
Payer: MEDICARE

## 2023-08-15 ENCOUNTER — ANESTHESIA (OUTPATIENT)
Dept: GASTROENTEROLOGY | Facility: HOSPITAL | Age: 78
End: 2023-08-15
Payer: MEDICARE

## 2023-08-15 VITALS
HEIGHT: 67 IN | TEMPERATURE: 97.9 F | RESPIRATION RATE: 16 BRPM | SYSTOLIC BLOOD PRESSURE: 110 MMHG | OXYGEN SATURATION: 94 % | BODY MASS INDEX: 18.83 KG/M2 | WEIGHT: 120 LBS | DIASTOLIC BLOOD PRESSURE: 56 MMHG | HEART RATE: 77 BPM

## 2023-08-15 DIAGNOSIS — D50.9 IRON DEFICIENCY ANEMIA, UNSPECIFIED IRON DEFICIENCY ANEMIA TYPE: ICD-10-CM

## 2023-08-15 LAB — GLUCOSE BLDC GLUCOMTR-MCNC: 107 MG/DL (ref 70–130)

## 2023-08-15 PROCEDURE — 82948 REAGENT STRIP/BLOOD GLUCOSE: CPT

## 2023-08-15 PROCEDURE — 25010000002 PROPOFOL 10 MG/ML EMULSION: Performed by: NURSE ANESTHETIST, CERTIFIED REGISTERED

## 2023-08-15 RX ORDER — ONDANSETRON 2 MG/ML
4 INJECTION INTRAMUSCULAR; INTRAVENOUS ONCE AS NEEDED
Status: DISCONTINUED | OUTPATIENT
Start: 2023-08-15 | End: 2023-08-15 | Stop reason: HOSPADM

## 2023-08-15 RX ORDER — KETAMINE HCL IN NACL, ISO-OSM 100MG/10ML
SYRINGE (ML) INJECTION AS NEEDED
Status: DISCONTINUED | OUTPATIENT
Start: 2023-08-15 | End: 2023-08-15 | Stop reason: SURG

## 2023-08-15 RX ORDER — SODIUM CHLORIDE, SODIUM LACTATE, POTASSIUM CHLORIDE, CALCIUM CHLORIDE 600; 310; 30; 20 MG/100ML; MG/100ML; MG/100ML; MG/100ML
INJECTION, SOLUTION INTRAVENOUS CONTINUOUS PRN
Status: DISCONTINUED | OUTPATIENT
Start: 2023-08-15 | End: 2023-08-15 | Stop reason: SURG

## 2023-08-15 RX ORDER — PROPOFOL 10 MG/ML
VIAL (ML) INTRAVENOUS AS NEEDED
Status: DISCONTINUED | OUTPATIENT
Start: 2023-08-15 | End: 2023-08-15 | Stop reason: SURG

## 2023-08-15 RX ORDER — SODIUM CHLORIDE, SODIUM LACTATE, POTASSIUM CHLORIDE, CALCIUM CHLORIDE 600; 310; 30; 20 MG/100ML; MG/100ML; MG/100ML; MG/100ML
1000 INJECTION, SOLUTION INTRAVENOUS CONTINUOUS
Status: DISCONTINUED | OUTPATIENT
Start: 2023-08-15 | End: 2023-08-15 | Stop reason: HOSPADM

## 2023-08-15 RX ORDER — SODIUM CHLORIDE 0.9 % (FLUSH) 0.9 %
10 SYRINGE (ML) INJECTION AS NEEDED
Status: DISCONTINUED | OUTPATIENT
Start: 2023-08-15 | End: 2023-08-15 | Stop reason: HOSPADM

## 2023-08-15 RX ORDER — LIDOCAINE HYDROCHLORIDE 20 MG/ML
INJECTION, SOLUTION INTRAVENOUS AS NEEDED
Status: DISCONTINUED | OUTPATIENT
Start: 2023-08-15 | End: 2023-08-15 | Stop reason: SURG

## 2023-08-15 RX ADMIN — PROPOFOL 20 MG: 10 INJECTION, EMULSION INTRAVENOUS at 12:48

## 2023-08-15 RX ADMIN — Medication 10 MG: at 12:46

## 2023-08-15 RX ADMIN — PROPOFOL 20 MG: 10 INJECTION, EMULSION INTRAVENOUS at 12:57

## 2023-08-15 RX ADMIN — LIDOCAINE HYDROCHLORIDE 40 MG: 20 INJECTION, SOLUTION INTRAVENOUS at 12:47

## 2023-08-15 RX ADMIN — SODIUM CHLORIDE, POTASSIUM CHLORIDE, SODIUM LACTATE AND CALCIUM CHLORIDE: 600; 310; 30; 20 INJECTION, SOLUTION INTRAVENOUS at 12:26

## 2023-08-15 RX ADMIN — LIDOCAINE HYDROCHLORIDE 20 MG: 20 INJECTION, SOLUTION INTRAVENOUS at 12:50

## 2023-08-15 RX ADMIN — SODIUM CHLORIDE, POTASSIUM CHLORIDE, SODIUM LACTATE AND CALCIUM CHLORIDE 1000 ML: 600; 310; 30; 20 INJECTION, SOLUTION INTRAVENOUS at 12:10

## 2023-08-15 RX ADMIN — PROPOFOL 20 MG: 10 INJECTION, EMULSION INTRAVENOUS at 13:00

## 2023-08-15 RX ADMIN — PROPOFOL 20 MG: 10 INJECTION, EMULSION INTRAVENOUS at 12:53

## 2023-08-15 RX ADMIN — PROPOFOL 10 MG: 10 INJECTION, EMULSION INTRAVENOUS at 12:50

## 2023-08-15 RX ADMIN — LIDOCAINE HYDROCHLORIDE 20 MG: 20 INJECTION, SOLUTION INTRAVENOUS at 12:58

## 2023-08-15 RX ADMIN — PROPOFOL 10 MG: 10 INJECTION, EMULSION INTRAVENOUS at 12:49

## 2023-08-15 NOTE — SIGNIFICANT NOTE
Patient did not have catheter upon arrival to same day surgery. Per patient report catheter removed.

## 2023-08-15 NOTE — ANESTHESIA POSTPROCEDURE EVALUATION
Patient: Torin Granados    Procedure Summary       Date: 08/15/23 Room / Location: [facility] ENDOSCOPY 3 / [facility] ENDOSCOPY    Anesthesia Start: 1239 Anesthesia Stop:     Procedure: ESOPHAGOGASTRODUODENOSCOPY (Esophagus) Diagnosis:       Iron deficiency anemia, unspecified iron deficiency anemia type      (Iron deficiency anemia, unspecified iron deficiency anemia type [D50.9])    Surgeons: Primo Navarro MD Provider: Celso Granados CRNA    Anesthesia Type: MAC ASA Status: 4            Anesthesia Type: MAC    Vitals  No vitals data found for the desired time range.          Post Anesthesia Care and Evaluation    Patient location during evaluation: PHASE II  Patient participation: complete - patient participated  Level of consciousness: awake  Pain score: 0  Pain management: adequate    Airway patency: patent  Anesthetic complications: No anesthetic complications  PONV Status: none  Cardiovascular status: acceptable  Respiratory status: acceptable and spontaneous ventilation  Hydration status: acceptable    Comments: See R.N. note for postop vital signs.vsss resp spont, reflexes intact, responsive, report given to pacu nurse

## 2023-08-15 NOTE — ANESTHESIA PREPROCEDURE EVALUATION
Anesthesia Evaluation     Patient summary reviewed and Nursing notes reviewed   no history of anesthetic complications:   NPO Solid Status: > 8 hours  NPO Liquid Status: > 8 hours           Airway   Mallampati: I  TM distance: >3 FB  Neck ROM: limited  no difficulty expected and Possible difficult intubation  Dental - normal exam     Pulmonary    (+) a smoker Current, COPD moderate,shortness of breath, decreased breath sounds  Cardiovascular - normal exam    PT is on anticoagulation therapy    (+) hypertension, CADDOE, PVD, DVT, hyperlipidemia      Neuro/Psych  (+) seizures, CVA, numbness, psychiatric history  GI/Hepatic/Renal/Endo    (+) GERD, PUD, diabetes mellitus type 2    Musculoskeletal     (+) arthralgias, back pain, chronic pain, myalgias  Abdominal    Substance History - negative use     OB/GYN negative ob/gyn ROS         Other   arthritis, blood dyscrasia anemia,   history of cancer    ROS/Med Hx Other: Rls   Labs reviewed  12/40  EKG 2022 Normal sinus rhythm  Poor R wave progression  Abnormal ECG  No previous ECGs available  Confirmed by Radha Butler ([id number]) on 6/1/2022 8:58:50 AM  Ccxr 2022 nad   2022 1. Mild plaque. No occlusion.  2. No hemodynamically significant stenosis of either RIGHT or LEFT ICA.  3. Antegrade flow noted both vertebral arteries.                    Anesthesia Plan    ASA 4     MAC     (Risks and benefits discussed including risk of aspiration, recall and dental damage. Discussed risk swith pt., pt  high intraop and postop risk for cv, resp, and neuro events,All patient questions answered.    Will continue with plan of care.)  intravenous induction     Anesthetic plan, risks, benefits, and alternatives have been provided, discussed and informed consent has been obtained with: patient.      CODE STATUS:

## 2023-08-17 LAB — REF LAB TEST METHOD: NORMAL

## 2023-08-30 ENCOUNTER — TELEPHONE (OUTPATIENT)
Dept: SURGERY | Facility: CLINIC | Age: 78
End: 2023-08-30
Payer: MEDICARE

## 2023-08-30 DIAGNOSIS — G25.81 RLS (RESTLESS LEGS SYNDROME): ICD-10-CM

## 2023-08-30 RX ORDER — ROPINIROLE 4 MG/1
TABLET, FILM COATED ORAL
Qty: 270 TABLET | Refills: 0 | Status: SHIPPED | OUTPATIENT
Start: 2023-08-30

## 2023-08-30 NOTE — TELEPHONE ENCOUNTER
Called Marvin to reschedule the cancelled appt on 08/24/2023 with . Pt answered phone then call dropped. Mailed out letter.

## 2023-09-13 DIAGNOSIS — E11.9 TYPE 2 DIABETES MELLITUS WITHOUT COMPLICATION, WITHOUT LONG-TERM CURRENT USE OF INSULIN: ICD-10-CM

## 2023-09-13 RX ORDER — LANCETS 30 GAUGE
EACH MISCELLANEOUS
Qty: 300 EACH | Refills: 3 | Status: SHIPPED | OUTPATIENT
Start: 2023-09-13

## 2023-09-13 RX ORDER — BLOOD SUGAR DIAGNOSTIC
STRIP MISCELLANEOUS
Qty: 300 EACH | Refills: 3 | Status: SHIPPED | OUTPATIENT
Start: 2023-09-13

## 2023-09-26 DIAGNOSIS — E11.9 TYPE 2 DIABETES MELLITUS WITHOUT COMPLICATION, WITHOUT LONG-TERM CURRENT USE OF INSULIN: ICD-10-CM

## 2023-09-26 RX ORDER — BLOOD SUGAR DIAGNOSTIC
STRIP MISCELLANEOUS
Qty: 300 EACH | Refills: 3 | Status: SHIPPED | OUTPATIENT
Start: 2023-09-26

## 2023-10-28 DIAGNOSIS — J44.9 MIXED TYPE COPD (CHRONIC OBSTRUCTIVE PULMONARY DISEASE): ICD-10-CM

## 2023-10-28 DIAGNOSIS — E11.9 TYPE 2 DIABETES MELLITUS WITHOUT COMPLICATION, WITHOUT LONG-TERM CURRENT USE OF INSULIN: ICD-10-CM

## 2023-10-28 RX ORDER — MONTELUKAST SODIUM 10 MG/1
10 TABLET ORAL DAILY
Qty: 90 TABLET | Refills: 3 | Status: SHIPPED | OUTPATIENT
Start: 2023-10-28

## 2023-10-28 RX ORDER — (INSULIN DEGLUDEC AND LIRAGLUTIDE) 100; 3.6 [IU]/ML; MG/ML
16 INJECTION, SOLUTION SUBCUTANEOUS DAILY
Qty: 30 ML | Refills: 3 | Status: SHIPPED | OUTPATIENT
Start: 2023-10-28

## 2023-10-30 ENCOUNTER — OFFICE VISIT (OUTPATIENT)
Dept: FAMILY MEDICINE CLINIC | Facility: CLINIC | Age: 78
End: 2023-10-30
Payer: MEDICARE

## 2023-10-30 VITALS
HEIGHT: 67 IN | WEIGHT: 135 LBS | BODY MASS INDEX: 21.19 KG/M2 | HEART RATE: 77 BPM | RESPIRATION RATE: 15 BRPM | TEMPERATURE: 98.6 F | OXYGEN SATURATION: 100 % | SYSTOLIC BLOOD PRESSURE: 126 MMHG | DIASTOLIC BLOOD PRESSURE: 64 MMHG

## 2023-10-30 DIAGNOSIS — M85.89 OSTEOPENIA OF MULTIPLE SITES: ICD-10-CM

## 2023-10-30 DIAGNOSIS — I25.10 CORONARY ARTERY CALCIFICATION SEEN ON CT SCAN: ICD-10-CM

## 2023-10-30 DIAGNOSIS — Z86.010 HISTORY OF COLONIC POLYPS: ICD-10-CM

## 2023-10-30 DIAGNOSIS — L29.9 PRURITUS: ICD-10-CM

## 2023-10-30 DIAGNOSIS — Z98.890 HISTORY OF RIGHT-SIDED CAROTID ENDARTERECTOMY: ICD-10-CM

## 2023-10-30 DIAGNOSIS — I63.81 MULTIPLE LACUNAR INFARCTS: ICD-10-CM

## 2023-10-30 DIAGNOSIS — N40.0 BPH WITHOUT OBSTRUCTION/LOWER URINARY TRACT SYMPTOMS: ICD-10-CM

## 2023-10-30 DIAGNOSIS — F41.8 ANXIETY ASSOCIATED WITH DEPRESSION: ICD-10-CM

## 2023-10-30 DIAGNOSIS — Z00.00 HEALTHCARE MAINTENANCE: ICD-10-CM

## 2023-10-30 DIAGNOSIS — M25.562 CHRONIC PAIN OF LEFT KNEE: ICD-10-CM

## 2023-10-30 DIAGNOSIS — I10 ESSENTIAL HYPERTENSION: ICD-10-CM

## 2023-10-30 DIAGNOSIS — Z23 ENCOUNTER FOR IMMUNIZATION: ICD-10-CM

## 2023-10-30 DIAGNOSIS — I63.421 CEREBRAL INFARCTION DUE TO EMBOLISM OF RIGHT ANTERIOR CEREBRAL ARTERY: ICD-10-CM

## 2023-10-30 DIAGNOSIS — F17.200 SMOKER: ICD-10-CM

## 2023-10-30 DIAGNOSIS — G25.81 RLS (RESTLESS LEGS SYNDROME): ICD-10-CM

## 2023-10-30 DIAGNOSIS — I70.1 RENAL ARTERY STENOSIS: ICD-10-CM

## 2023-10-30 DIAGNOSIS — R35.0 BENIGN PROSTATIC HYPERPLASIA WITH URINARY FREQUENCY: ICD-10-CM

## 2023-10-30 DIAGNOSIS — M54.59 MECHANICAL LOW BACK PAIN: ICD-10-CM

## 2023-10-30 DIAGNOSIS — E78.2 MIXED HYPERLIPIDEMIA: ICD-10-CM

## 2023-10-30 DIAGNOSIS — K21.9 GASTROESOPHAGEAL REFLUX DISEASE WITHOUT ESOPHAGITIS: ICD-10-CM

## 2023-10-30 DIAGNOSIS — N40.1 BENIGN PROSTATIC HYPERPLASIA WITH INCOMPLETE BLADDER EMPTYING: ICD-10-CM

## 2023-10-30 DIAGNOSIS — M54.17 LUMBOSACRAL RADICULOPATHY AT S1: ICD-10-CM

## 2023-10-30 DIAGNOSIS — D50.0 IRON DEFICIENCY ANEMIA DUE TO CHRONIC BLOOD LOSS: ICD-10-CM

## 2023-10-30 DIAGNOSIS — G89.29 CHRONIC PAIN OF LEFT KNEE: ICD-10-CM

## 2023-10-30 DIAGNOSIS — E11.9 TYPE 2 DIABETES MELLITUS WITHOUT COMPLICATION, WITHOUT LONG-TERM CURRENT USE OF INSULIN: ICD-10-CM

## 2023-10-30 DIAGNOSIS — R39.14 BENIGN PROSTATIC HYPERPLASIA WITH INCOMPLETE BLADDER EMPTYING: ICD-10-CM

## 2023-10-30 DIAGNOSIS — I70.0 ATHEROSCLEROSIS OF AORTA: ICD-10-CM

## 2023-10-30 DIAGNOSIS — J44.9 MIXED TYPE COPD (CHRONIC OBSTRUCTIVE PULMONARY DISEASE): ICD-10-CM

## 2023-10-30 DIAGNOSIS — J30.9 CHRONIC ALLERGIC RHINITIS: Primary | ICD-10-CM

## 2023-10-30 DIAGNOSIS — N40.1 BENIGN PROSTATIC HYPERPLASIA WITH URINARY FREQUENCY: ICD-10-CM

## 2023-10-30 RX ORDER — TAMSULOSIN HYDROCHLORIDE 0.4 MG/1
1 CAPSULE ORAL DAILY
Qty: 90 CAPSULE | Refills: 3 | Status: SHIPPED | OUTPATIENT
Start: 2023-10-30

## 2023-10-30 RX ORDER — LISINOPRIL 40 MG/1
40 TABLET ORAL DAILY
Qty: 90 TABLET | Refills: 3 | Status: SHIPPED | OUTPATIENT
Start: 2023-10-30

## 2023-10-30 RX ORDER — TRIAMCINOLONE ACETONIDE 1 MG/G
1 CREAM TOPICAL 2 TIMES DAILY
Qty: 80 G | Refills: 5 | Status: SHIPPED | OUTPATIENT
Start: 2023-10-30

## 2023-10-30 RX ORDER — GABAPENTIN 300 MG/1
300 CAPSULE ORAL NIGHTLY
Qty: 90 CAPSULE | Refills: 1 | Status: SHIPPED | OUTPATIENT
Start: 2023-10-30

## 2023-10-30 NOTE — PROGRESS NOTES
Subjective   Torin Granados is a 78 y.o. male.     Chief Complaint  He returns for a scheduled reassessment of multiple medical problems including previous right frontal cerebral infarcts, type 2 diabetes mellitus, hyperlipidemia, essential hypertension, benign prostatic hypertrophy, left knee pain, restless leg syndrome, and iron deficiency anemia    History of Present Illness     Right Frontal Cerebral Infarcts  He was admitted to [facility] on 10/28/2022 after a sudden episode of generalized weakness associated with collapse and apparent involuntary movements of both arms and legs.  He did not lose consciousness and recalls the entire experience.  Noncontrast CT of the brain on admission was reported as showing moderate atrophy and a an old lacunar infarct of the anterior limb of the right internal capsule.  CTA of the brain and carotids was reported as showing a 95% stenosis of the proximal right ICA, a less than 50% stenosis of the proximal left ICA, and changes consistent with advanced centrilobular emphysema.  He underwent a right carotid endarterectomy on 11/3/2022.  MRI of the brain performed on 11/4/2022 revealed moderate atrophy, chronic microvascular changes, and old right basal ganglia and periventricular lacunar infarcts, and small acute infarcts of the right frontal cortex and subcortical white matter.  Echocardiogram done while in hospital was reported as showing sclerotic aortic valve leaflets but no other significant normalities with an estimated EF of 65%.  Ultrasound of the renal arteries was reported as showing a more than 60% stenosis on the left and a nonsignificant stenosis on the right.  He remains on clopidogrel and low-dose ASA along with levetiracetam. Unfortunately he continues to smoke and dips tobacco. He continues to deny any unilateral weakness, numbness, or tingling and there is no history of any loss of vision in either eye or side nor any difficulty talking or understanding what is  said to him.  Updated ultrasound of the carotid arteries performed on 5/22/2023 revealed mild plaque but no significant stenosis    Type 2 Diabetes Mellitus  He admits to numbness and tingling of the feet.  He continues to deny any visual disturbances, polydipsia, polyuria, hypoglycemia or foot ulcerations.  He is following an appropriate diet and exercise plan and remains on empagliflozin, GLP agonist -liraglutide and basal insulin (together as xultophy -16 units daily).  He has had no recent labs    Hyperlipidemia  He remains on atorvastatin and ezetimibe with no apparent side effects    Essential Hypertension  He continues to deny any chest pain, palpitations, dyspnea, orthopnea, paroxysmal nocturnal dyspnea or peripheral edema.  He remains on lisinopril and amlodipine.    BPH  He underwent a TURP greenlight by Dr. Barraza on 6/2/2022.  His course was uncomplicated and his bladder catheter removed 4 days afterward.  He complains of frequency, nocturia, and a sense of incomplete voiding with occasional dysuria.  He denies any hematuria.  He underwent a urology reassessment with Griselda Cheng-Akwa APRN on 8/4/2023    Left Knee Pain  He has a long history of intermittent left knee pain associated with intermittent posterior swelling. There is no history of any stiffness and he continues to deny any giving way or locking.  He gives a history of several implant procedures on his left knee 1 of which was apparently an ACL repair. He is right-hand dominant.  Plain films of the knee performed on 4/28/2023 were reported as showing hardware associated with a previous ACL repair along with joint space narrowing and chondrocalcinosis worse medially.  Ultrasound of the left knee performed on 6/22/2023 was reported as showing a small joint effusion and posterior cystic fluid consistent with a popliteal cyst    Restless Leg Syndrome  He has a long history of intermittent lower extremity discomfort.  He describes this as a  tight ache centered about his knees.  Episodes occur primarily at night.  He admits to occasional knee stiffness but denies any swelling.  He is currently on ropinirole and gabapentin but is unconvinced that the latter has helped with l his symptoms.  He denies any apparent side effects    Iron Deficiency Anemia  He is on iron supplementation with no apparent side effects.  He underwent an updated colonoscopy on 8/17/2022 with removal of 7 tubular adenomatous polyps.  He was advised to have his next study in 3 years.  He underwent an EGD by Dr. Navarro on 8/15/2023 with evidence of iron pill gastritis    The following portions of the patient's history were reviewed and updated as appropriate: allergies, current medications, past medical history, past social history, and problem list.    Review of Systems   Constitutional:  Positive for fatigue. Negative for appetite change, chills, diaphoresis, fever and unexpected weight change.   HENT:  Positive for rhinorrhea and sneezing. Negative for congestion, ear pain, postnasal drip, sore throat and voice change.    Eyes:  Negative for visual disturbance.   Respiratory:  Positive for shortness of breath. Negative for cough and wheezing.    Cardiovascular:  Negative for chest pain, palpitations and leg swelling.   Gastrointestinal:  Negative for abdominal pain, blood in stool, constipation, diarrhea, nausea and vomiting.   Endocrine: Negative for polydipsia and polyuria.   Genitourinary:  Positive for dysuria, frequency (with nocturia) and urgency. Negative for difficulty urinating and hematuria.   Musculoskeletal:  Positive for arthralgias, back pain, joint swelling and myalgias. Negative for neck pain.   Skin:  Negative for rash.        Intermittent pruritus in one area or another -skin is generally been a bit dry -has been using Neosporin on and off   Neurological:  Positive for dizziness, numbness and headaches. Negative for tremors and weakness.   Psychiatric/Behavioral:   Positive for confusion. Negative for dysphoric mood and sleep disturbance. The patient is nervous/anxious.      Objective   Physical Exam  Constitutional:       General: He is not in acute distress.     Appearance: Normal appearance. He is well-developed. He is not ill-appearing or diaphoretic.      Comments: Bright and in good spirits. No apparent distress. No pallor, jaundice, diaphoresis, or cyanosis.   HENT:      Head: Atraumatic.      Right Ear: Tympanic membrane, ear canal and external ear normal.      Left Ear: Tympanic membrane, ear canal and external ear normal.   Eyes:      Conjunctiva/sclera: Conjunctivae normal.   Neck:      Thyroid: No thyroid mass or thyromegaly.      Vascular: No carotid bruit or JVD.      Trachea: Trachea normal. No tracheal deviation.   Cardiovascular:      Rate and Rhythm: Normal rate and regular rhythm.      Heart sounds: Normal heart sounds, S1 normal and S2 normal. No murmur heard.     No gallop. No S3 or S4 sounds.   Pulmonary:      Effort: Pulmonary effort is normal.      Breath sounds: Wheezing (diffuse - mild) present.      Comments: Pulmonary hyperinflation  Abdominal:      General: Bowel sounds are normal. There is no distension.   Musculoskeletal:      Right lower leg: No edema.      Left lower leg: No edema.      Comments: No peripheral joint redness or warmth.   Lymphadenopathy:      Head:      Right side of head: No submental, submandibular, tonsillar, preauricular, posterior auricular or occipital adenopathy.      Left side of head: No submental, submandibular, tonsillar, preauricular, posterior auricular or occipital adenopathy.      Cervical: No cervical adenopathy.      Upper Body:      Right upper body: No supraclavicular adenopathy.      Left upper body: No supraclavicular adenopathy.   Skin:     General: Skin is warm and dry.      Coloration: Skin is not cyanotic, jaundiced or pale.      Findings: No lesion or rash.      Nails: There is no clubbing.    Neurological:      Mental Status: He is alert and oriented to person, place, and time.      Cranial Nerves: No cranial nerve deficit.      Sensory: Sensory deficit (decreased vibration sense toes of both feet) present.      Motor: No tremor.      Coordination: Coordination normal.      Gait: Gait normal.   Psychiatric:         Attention and Perception: Attention normal.         Mood and Affect: Mood normal.         Speech: Speech normal.         Behavior: Behavior normal.         Thought Content: Thought content normal.       Assessment & Plan   Problems Addressed this Visit          Allergies and Adverse Reactions    Chronic allergic rhinitis  Continue current medication  Encouraged to report if any worse or if any new symptoms or concerns.       Cardiac and Vasculature    Atherosclerosis of aorta  Reminded regarding risk factor modification with an emphasis on tobacco cessation.  Continue dual antiplatelet therapy.    Relevant Orders    CBC & Differential    Coronary artery calcification seen on CT scan  As above.    Relevant Orders    CBC & Differential    Essential hypertension  Encouraged to continue to work on his diet and exercise plan.  Continue current medication    Relevant Medications    lisinopril (PRINIVIL,ZESTRIL) 40 MG tablet    Other Relevant Orders    Comprehensive Metabolic Panel    TSH    History of right-sided carotid endarterectomy  As above.   Continue current medication.    Mixed hyperlipidemia  As above.   Continue current medication.    Relevant Orders    Comprehensive Metabolic Panel    Lipid Panel    TSH    Renal artery stenosis  As above.   Continue current medication.    Relevant Orders    CBC & Differential    Comprehensive Metabolic Panel       Endocrine and Metabolic    Type 2 diabetes mellitus without complication, without long-term current use of insulin  Diabetes mellitus Type II, under unknown control.   Encouraged to continue to pursue ADA diet  Encouraged aerobic  exercise.  Continue current medication  Updated labs drawn.    Relevant Orders    Comprehensive Metabolic Panel    TSH    Hemoglobin A1c    Vitamin B12    MicroAlbumin, Urine, Random - Urine, Clean Catch       Gastrointestinal Abdominal     Gastroesophageal reflux disease without esophagitis    Relevant Orders    CBC & Differential    History of colonic polyps       Genitourinary and Reproductive     BPH without obstruction/lower urinary tract symptoms  Continue current medication  Follow up with urology    Relevant Medications    tamsulosin (FLOMAX) 0.4 MG capsule 24 hr capsule       Health Encounters    Healthcare maintenance  Flu shot administered.  Recommended an updated COVID-19 vaccination.  Reviewed the potential benefits of RSV vaccination.  Patient will reschedule his low-dose CT of the chest and will let us know if he has any difficulty doing so    Relevant Orders    Fluzone High-Dose 65+yrs       Hematology and Neoplasia    Iron deficiency anemia due to chronic blood loss  With evidence of iron pill gastritis on recent EGD  Reviewed options going forward including iron infusions.  Patient like to consider       Mental Health    Anxiety associated with depression    Relevant Orders    TSH       Musculoskeletal and Injuries    Chronic pain of left knee  Reminded regarding symptomatic treatment.   Continue current medication  Encouraged to report if any worse or if any new symptoms or concerns.    Mechanical low back pain    Osteopenia of multiple sites    Relevant Orders    Vitamin D,25-Hydroxy       Neuro    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   Continue current medication.    Relevant Orders    CBC & Differential    Lumbosacral radiculopathy at S1    Multiple lacunar infarcts  As above.   Continue current medication.    RLS (restless legs syndrome)    Relevant Medications    gabapentin (NEURONTIN) 300 MG capsule       Pulmonary and Pneumonias    Mixed type COPD  (chronic obstructive pulmonary disease)   COPD is stable.  Reminded of the importance of smoking cessation  Encouraged to remain as active as symptoms allow for    Relevant Orders    CBC & Differential       Skin    Pruritus  Intermittent small areas of pruritus unassociated with any rash  Instructed regarding skin care  Advised to avoid topical antibiotics and antihistamines  Agreed on a trial of a low potency corticosteroid as needed  Encouraged to report if any worse or if any new symptoms or concerns.    Relevant Medications    triamcinolone (KENALOG) 0.1 % cream       Tobacco    Smoker             Diagnoses         Codes Comments    Chronic allergic rhinitis    -  Primary ICD-10-CM: J30.9  ICD-9-CM: 477.9     Renal artery stenosis     ICD-10-CM: I70.1  ICD-9-CM: 440.1     Mixed hyperlipidemia     ICD-10-CM: E78.2  ICD-9-CM: 272.2     History of right-sided carotid endarterectomy     ICD-10-CM: Z98.890  ICD-9-CM: V45.89     Essential hypertension     ICD-10-CM: I10  ICD-9-CM: 401.9     Coronary artery calcification seen on CT scan     ICD-10-CM: I25.10  ICD-9-CM: 414.00     Atherosclerosis of aorta     ICD-10-CM: I70.0  ICD-9-CM: 440.0     Type 2 diabetes mellitus without complication, without long-term current use of insulin     ICD-10-CM: E11.9  ICD-9-CM: 250.00     History of colonic polyps     ICD-10-CM: Z86.010  ICD-9-CM: V12.72     Gastroesophageal reflux disease without esophagitis     ICD-10-CM: K21.9  ICD-9-CM: 530.81     BPH without obstruction/lower urinary tract symptoms     ICD-10-CM: N40.0  ICD-9-CM: 600.00     Healthcare maintenance     ICD-10-CM: Z00.00  ICD-9-CM: V70.0     Anxiety associated with depression     ICD-10-CM: F41.8  ICD-9-CM: 300.4     Osteopenia of multiple sites     ICD-10-CM: M85.89  ICD-9-CM: 733.90     Mechanical low back pain     ICD-10-CM: M54.59  ICD-9-CM: 724.2     Chronic pain of left knee     ICD-10-CM: M25.562, G89.29  ICD-9-CM: 719.46, 338.29     Multiple lacunar  "infarcts     ICD-10-CM: I63.81  ICD-9-CM: 434.91     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1, R39.14  ICD-9-CM: 600.01, 788.21     BPH without obstruction/lower urinary tract symptoms     ICD-10-CM: N40.0  ICD-9-CM: 600.00 The patient will be scheduled for cystoscopy with Dr. Tenorio to evaluate for TURP.  Flomax 1 capsule daily, may increase to 2 capsules.  PSA ordered.    Pruritus     ICD-10-CM: L29.9  ICD-9-CM: 698.9     Iron deficiency anemia due to chronic blood loss     ICD-10-CM: D50.0  ICD-9-CM: 280.0           I spent 54 minutes caring for Torin \"Marvin\" Darwin on this date of service. This time includes time spent by me in the following activities:reviewing tests, performing a medically appropriate examination and/or evaluation , counseling and educating the patient/family/caregiver, ordering medications, tests, or procedures and documenting information in the medical record              "

## 2023-10-31 DIAGNOSIS — E55.9 VITAMIN D DEFICIENCY: ICD-10-CM

## 2023-10-31 DIAGNOSIS — M85.89 OSTEOPENIA OF MULTIPLE SITES: Primary | ICD-10-CM

## 2023-10-31 LAB
25(OH)D3+25(OH)D2 SERPL-MCNC: 28 NG/ML (ref 30–100)
ALBUMIN SERPL-MCNC: 4.3 G/DL (ref 3.5–5.2)
ALBUMIN/GLOB SERPL: 2.3 G/DL
ALP SERPL-CCNC: 59 U/L (ref 39–117)
ALT SERPL-CCNC: 25 U/L (ref 1–41)
AST SERPL-CCNC: 23 U/L (ref 1–40)
BASOPHILS # BLD AUTO: 0.02 10*3/MM3 (ref 0–0.2)
BASOPHILS NFR BLD AUTO: 0.3 % (ref 0–1.5)
BILIRUB SERPL-MCNC: 0.3 MG/DL (ref 0–1.2)
BUN SERPL-MCNC: 17 MG/DL (ref 8–23)
BUN/CREAT SERPL: 30.9 (ref 7–25)
CALCIUM SERPL-MCNC: 9.5 MG/DL (ref 8.6–10.5)
CHLORIDE SERPL-SCNC: 92 MMOL/L (ref 98–107)
CHOLEST SERPL-MCNC: 163 MG/DL (ref 0–200)
CO2 SERPL-SCNC: 26.6 MMOL/L (ref 22–29)
CREAT SERPL-MCNC: 0.55 MG/DL (ref 0.76–1.27)
EGFRCR SERPLBLD CKD-EPI 2021: 102.7 ML/MIN/1.73
EOSINOPHIL # BLD AUTO: 0.03 10*3/MM3 (ref 0–0.4)
EOSINOPHIL NFR BLD AUTO: 0.5 % (ref 0.3–6.2)
ERYTHROCYTE [DISTWIDTH] IN BLOOD BY AUTOMATED COUNT: 12.4 % (ref 12.3–15.4)
GLOBULIN SER CALC-MCNC: 1.9 GM/DL
GLUCOSE SERPL-MCNC: 72 MG/DL (ref 65–99)
HBA1C MFR BLD: 6.5 % (ref 4.8–5.6)
HCT VFR BLD AUTO: 39.3 % (ref 37.5–51)
HDLC SERPL-MCNC: 67 MG/DL (ref 40–60)
HGB BLD-MCNC: 14.1 G/DL (ref 13–17.7)
IMM GRANULOCYTES # BLD AUTO: 0.03 10*3/MM3 (ref 0–0.05)
IMM GRANULOCYTES NFR BLD AUTO: 0.5 % (ref 0–0.5)
LDLC SERPL CALC-MCNC: 86 MG/DL (ref 0–100)
LYMPHOCYTES # BLD AUTO: 1.15 10*3/MM3 (ref 0.7–3.1)
LYMPHOCYTES NFR BLD AUTO: 19.2 % (ref 19.6–45.3)
MCH RBC QN AUTO: 33.5 PG (ref 26.6–33)
MCHC RBC AUTO-ENTMCNC: 35.9 G/DL (ref 31.5–35.7)
MCV RBC AUTO: 93.3 FL (ref 79–97)
MONOCYTES # BLD AUTO: 0.55 10*3/MM3 (ref 0.1–0.9)
MONOCYTES NFR BLD AUTO: 9.2 % (ref 5–12)
NEUTROPHILS # BLD AUTO: 4.22 10*3/MM3 (ref 1.7–7)
NEUTROPHILS NFR BLD AUTO: 70.3 % (ref 42.7–76)
NRBC BLD AUTO-RTO: 0 /100 WBC (ref 0–0.2)
PLATELET # BLD AUTO: 261 10*3/MM3 (ref 140–450)
POTASSIUM SERPL-SCNC: 4.8 MMOL/L (ref 3.5–5.2)
PROT SERPL-MCNC: 6.2 G/DL (ref 6–8.5)
RBC # BLD AUTO: 4.21 10*6/MM3 (ref 4.14–5.8)
SODIUM SERPL-SCNC: 130 MMOL/L (ref 136–145)
TRIGL SERPL-MCNC: 46 MG/DL (ref 0–150)
TSH SERPL DL<=0.005 MIU/L-ACNC: 1.48 UIU/ML (ref 0.27–4.2)
VIT B12 SERPL-MCNC: >2000 PG/ML (ref 211–946)
VLDLC SERPL CALC-MCNC: 10 MG/DL (ref 5–40)
WBC # BLD AUTO: 6 10*3/MM3 (ref 3.4–10.8)

## 2023-10-31 RX ORDER — ERGOCALCIFEROL 1.25 MG/1
50000 CAPSULE ORAL WEEKLY
Qty: 12 CAPSULE | Refills: 1 | Status: SHIPPED | OUTPATIENT
Start: 2023-10-31

## 2023-11-08 DIAGNOSIS — Z79.899 ENCOUNTER FOR MEDICATION MANAGEMENT: Primary | ICD-10-CM

## 2023-11-14 DIAGNOSIS — G25.81 RLS (RESTLESS LEGS SYNDROME): ICD-10-CM

## 2023-11-14 RX ORDER — ROPINIROLE 4 MG/1
TABLET, FILM COATED ORAL
Qty: 270 TABLET | Refills: 0 | Status: SHIPPED | OUTPATIENT
Start: 2023-11-14

## 2023-11-30 PROBLEM — E55.9 VITAMIN D DEFICIENCY: Status: ACTIVE | Noted: 2023-11-30

## 2023-12-21 DIAGNOSIS — E11.9 TYPE 2 DIABETES MELLITUS WITHOUT COMPLICATION, WITHOUT LONG-TERM CURRENT USE OF INSULIN: ICD-10-CM

## 2023-12-21 RX ORDER — (INSULIN DEGLUDEC AND LIRAGLUTIDE) 100; 3.6 [IU]/ML; MG/ML
25 INJECTION, SOLUTION SUBCUTANEOUS DAILY
Qty: 30 ML | Refills: 3 | Status: SHIPPED | OUTPATIENT
Start: 2023-12-21

## 2024-01-17 DIAGNOSIS — E11.9 TYPE 2 DIABETES MELLITUS WITHOUT COMPLICATION, WITHOUT LONG-TERM CURRENT USE OF INSULIN: ICD-10-CM

## 2024-01-17 RX ORDER — BLOOD SUGAR DIAGNOSTIC
STRIP MISCELLANEOUS
Qty: 300 EACH | Refills: 3 | Status: SHIPPED | OUTPATIENT
Start: 2024-01-17

## 2024-01-25 ENCOUNTER — HOSPITAL ENCOUNTER (OUTPATIENT)
Dept: CT IMAGING | Facility: HOSPITAL | Age: 79
Discharge: HOME | End: 2024-01-25
Admitting: GENERAL PRACTICE
Payer: MEDICARE

## 2024-01-25 DIAGNOSIS — Z87.891 PERSONAL HISTORY OF NICOTINE DEPENDENCE: ICD-10-CM

## 2024-01-25 DIAGNOSIS — F17.200 SMOKER: ICD-10-CM

## 2024-01-25 DIAGNOSIS — Z00.00 HEALTHCARE MAINTENANCE: ICD-10-CM

## 2024-01-25 PROCEDURE — 71271 CT THORAX LUNG CANCER SCR C-: CPT | Performed by: RADIOLOGY

## 2024-01-25 PROCEDURE — 71271 CT THORAX LUNG CANCER SCR C-: CPT

## 2024-02-01 ENCOUNTER — OFFICE VISIT (OUTPATIENT)
Dept: FAMILY MEDICINE CLINIC | Facility: CLINIC | Age: 79
End: 2024-02-01
Payer: MEDICARE

## 2024-02-01 VITALS
DIASTOLIC BLOOD PRESSURE: 63 MMHG | TEMPERATURE: 98.6 F | HEIGHT: 67 IN | SYSTOLIC BLOOD PRESSURE: 118 MMHG | RESPIRATION RATE: 15 BRPM | OXYGEN SATURATION: 96 % | HEART RATE: 80 BPM | WEIGHT: 142 LBS | BODY MASS INDEX: 22.29 KG/M2

## 2024-02-01 DIAGNOSIS — D50.0 IRON DEFICIENCY ANEMIA DUE TO CHRONIC BLOOD LOSS: ICD-10-CM

## 2024-02-01 DIAGNOSIS — Z86.010 HISTORY OF COLONIC POLYPS: ICD-10-CM

## 2024-02-01 DIAGNOSIS — M85.89 OSTEOPENIA OF MULTIPLE SITES: ICD-10-CM

## 2024-02-01 DIAGNOSIS — E55.9 VITAMIN D DEFICIENCY: ICD-10-CM

## 2024-02-01 DIAGNOSIS — E11.9 TYPE 2 DIABETES MELLITUS WITHOUT COMPLICATION, WITHOUT LONG-TERM CURRENT USE OF INSULIN: ICD-10-CM

## 2024-02-01 DIAGNOSIS — M54.59 MECHANICAL LOW BACK PAIN: ICD-10-CM

## 2024-02-01 DIAGNOSIS — I70.0 ATHEROSCLEROSIS OF AORTA: ICD-10-CM

## 2024-02-01 DIAGNOSIS — E78.2 MIXED HYPERLIPIDEMIA: ICD-10-CM

## 2024-02-01 DIAGNOSIS — I70.1 RENAL ARTERY STENOSIS: ICD-10-CM

## 2024-02-01 DIAGNOSIS — Z85.828 HISTORY OF NONMELANOMA SKIN CANCER: ICD-10-CM

## 2024-02-01 DIAGNOSIS — K21.9 GASTROESOPHAGEAL REFLUX DISEASE WITHOUT ESOPHAGITIS: ICD-10-CM

## 2024-02-01 DIAGNOSIS — G89.29 CHRONIC PAIN OF LEFT KNEE: ICD-10-CM

## 2024-02-01 DIAGNOSIS — J30.9 CHRONIC ALLERGIC RHINITIS: Primary | ICD-10-CM

## 2024-02-01 DIAGNOSIS — M25.562 CHRONIC PAIN OF LEFT KNEE: ICD-10-CM

## 2024-02-01 DIAGNOSIS — J44.9 MIXED TYPE COPD (CHRONIC OBSTRUCTIVE PULMONARY DISEASE): ICD-10-CM

## 2024-02-01 DIAGNOSIS — F41.8 ANXIETY ASSOCIATED WITH DEPRESSION: ICD-10-CM

## 2024-02-01 DIAGNOSIS — M54.17 LUMBOSACRAL RADICULOPATHY AT S1: ICD-10-CM

## 2024-02-01 DIAGNOSIS — I63.81 MULTIPLE LACUNAR INFARCTS: ICD-10-CM

## 2024-02-01 DIAGNOSIS — I10 ESSENTIAL HYPERTENSION: ICD-10-CM

## 2024-02-01 DIAGNOSIS — I63.421 CEREBRAL INFARCTION DUE TO EMBOLISM OF RIGHT ANTERIOR CEREBRAL ARTERY: ICD-10-CM

## 2024-02-01 DIAGNOSIS — G25.81 RLS (RESTLESS LEGS SYNDROME): ICD-10-CM

## 2024-02-01 DIAGNOSIS — N40.0 BPH WITHOUT OBSTRUCTION/LOWER URINARY TRACT SYMPTOMS: ICD-10-CM

## 2024-02-01 DIAGNOSIS — Z00.00 HEALTHCARE MAINTENANCE: ICD-10-CM

## 2024-02-01 DIAGNOSIS — Z98.890 HISTORY OF RIGHT-SIDED CAROTID ENDARTERECTOMY: ICD-10-CM

## 2024-02-01 DIAGNOSIS — I25.10 CORONARY ARTERY CALCIFICATION SEEN ON CT SCAN: ICD-10-CM

## 2024-02-01 DIAGNOSIS — F17.200 SMOKER: ICD-10-CM

## 2024-02-01 RX ORDER — DULAGLUTIDE 0.75 MG/.5ML
0.75 INJECTION, SOLUTION SUBCUTANEOUS WEEKLY
Qty: 6 ML | Refills: 3 | Status: SHIPPED | OUTPATIENT
Start: 2024-02-01

## 2024-02-01 NOTE — PROGRESS NOTES
The ABCs of the Annual Wellness Visit  Subsequent Medicare Wellness Visit    Subjective    Torin Granados is a 78 y.o. male who presents for a Subsequent Medicare Wellness Visit.    The following portions of the patient's history were reviewed and   updated as appropriate: allergies, current medications, past family history, past medical history, past social history, past surgical history, and problem list.    Compared to one year ago, the patient feels his physical   health is better.    Compared to one year ago, the patient feels his mental   health is better.    Recent Hospitalizations:  He was not admitted to the hospital during the last year.     Current Medical Providers:  Patient Care Team:  Jan Dudley MD as PCP - General (Family Medicine)    Outpatient Medications Prior to Visit   Medication Sig Dispense Refill    amLODIPine (NORVASC) 10 MG tablet Take 1 tablet by mouth Daily. 90 tablet 3    aspirin 81 MG EC tablet Take 1 tablet by mouth Daily. 90 tablet 3    atorvastatin (LIPITOR) 80 MG tablet Take 1 tablet by mouth Every Night. 90 tablet 3    clopidogrel (PLAVIX) 75 MG tablet Take 1 tablet by mouth Daily. 90 tablet 3    DULoxetine (CYMBALTA) 60 MG capsule Take 2 capsules by mouth Daily. 180 capsule 3    empagliflozin (Jardiance) 10 MG tablet tablet Take 1 tablet by mouth Every Morning. 90 tablet 3    ezetimibe (ZETIA) 10 MG tablet Take 1 tablet by mouth Daily. 90 tablet 3    ferrous sulfate 325 (65 FE) MG EC tablet 1 po daily for one week then 1 po twice daily afterward 60 tablet 2    fluticasone (FLONASE) 50 MCG/ACT nasal spray 2 sprays into the nostril(s) as directed by provider Daily. Administer 2 sprays both nostrils once daily 16 g 5    gabapentin (NEURONTIN) 300 MG capsule Take 1 capsule by mouth Every Night. 1 tablet po nightly for one week, then 2 tablets po nightly afterward 90 capsule 1    glucose blood (Accu-Chek Ana Rosa Plus) test strip USE TO TEST BLOOD GLUCOSE THREE TIMES A DAY  300 each 3    Insulin Pen Needle (BD Pen Needle Courtney U/F) 32G X 4 MM misc 1 each by Other route Daily. use to inject insulin once daily 90 each 3    Lancets misc 1 three times daily 300 each 3    lansoprazole (PREVACID) 30 MG capsule Take 1 capsule by mouth Daily. 90 capsule 3    levETIRAcetam (KEPPRA) 1000 MG tablet Take 1 tablet by mouth 2 (Two) Times a Day. 180 tablet 3    lisinopril (PRINIVIL,ZESTRIL) 40 MG tablet Take 1 tablet by mouth Daily. 90 tablet 3    montelukast (SINGULAIR) 10 MG tablet Take 1 tablet by mouth Daily. 90 tablet 3    rOPINIRole (REQUIP) 4 MG tablet TAKE 1-3 TABLETS BY MOUTH EVERY NIGHT AT BEDTIME AS NEEDED 270 tablet 0    tamsulosin (FLOMAX) 0.4 MG capsule 24 hr capsule Take 1 capsule by mouth Daily. 90 capsule 3    triamcinolone (KENALOG) 0.1 % cream Apply 1 application  topically to the appropriate area as directed 2 (Two) Times a Day. 80 g 5    vitamin C (ASCORBIC ACID) 500 MG tablet 1 po with each dose of ferrous sulfate 60 tablet 2    vitamin D (ERGOCALCIFEROL) 1.25 MG (97837 UT) capsule capsule Take 1 capsule by mouth 1 (One) Time Per Week. 12 capsule 1    Insulin Degludec-Liraglutide (Xultophy) 100-3.6 UNIT-MG/ML solution pen-injector subcutaneous pen Inject 25 Units under the skin into the appropriate area as directed Daily. 30 mL 3     No facility-administered medications prior to visit.     No opioid medication identified on active medication list. I have reviewed chart for other potential  high risk medication/s and harmful drug interactions in the elderly.        Aspirin is on active medication list. Aspirin use is indicated based on review of current medical condition/s. Pros and cons of this therapy have been discussed today. Benefits of this medication outweigh potential harm.  Patient has been encouraged to continue taking this medication.  .    Patient Active Problem List   Diagnosis    Gastroesophageal reflux disease without esophagitis    Anxiety associated with  "depression    Mechanical low back pain    Atherosclerosis of aorta    Smoker    Essential hypertension    Mixed hyperlipidemia    Type 2 diabetes mellitus without complication, without long-term current use of insulin    Androgen deficiency    Healthcare maintenance    History of colonic polyps    Coronary artery calcification seen on CT scan    Osteopenia of multiple sites    Lumbosacral radiculopathy at S1    Mixed type COPD (chronic obstructive pulmonary disease)    BPH without obstruction/lower urinary tract symptoms    RLS (restless legs syndrome)    History of nonmelanoma skin cancer    Encounter for immunization    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  ACP discussion was held with the patient during this visit. Patient does not have an advance directive, information provided.     Objective    Vitals:    02/01/24 1357   BP: 118/63   Pulse: 80   Resp: 15   Temp: 98.6 °F (37 °C)   TempSrc: Temporal   SpO2: 96%   Weight: 64.4 kg (142 lb)   Height: 170.2 cm (67\")     Estimated body mass index is 22.24 kg/m² as calculated from the following:    Height as of this encounter: 170.2 cm (67\").    Weight as of this encounter: 64.4 kg (142 lb).    BMI is within normal parameters. No other follow-up for BMI required.    Does the patient have evidence of cognitive impairment? 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 No   Do you have difficulty bathing yourself, getting dressed or grooming yourself? No   Do you have difficulty using the toilet? No   Do you have difficulty moving around from place to place? No   Do you have trouble with steps or getting out of a bed or a chair? No   Current Diet Well Balanced Diet   Dental Exam Not up to date   Eye Exam Not up to date   Exercise (times per week) 7 times per week   Current Exercises Include Walking   Do you need help using the phone?  No   Are you deaf or do you have serious difficulty hearing?  No   Do you need help to go to places out of walking distance? No   Do you need help shopping? No   Do you need help preparing meals?  No   Do you need help with housework?  No   Do you need help with laundry? No   Do you need help taking your medications? No   Do you need help managing money? No   Do you ever drive or ride in a car without wearing a seat belt? No   Have you felt unusual stress, anger or loneliness in the last month? No   Who do you live with? Spouse   If you need help, do you have trouble finding someone available to you? No   Have you been bothered in the last four weeks by sexual problems? No   Do you have difficulty  concentrating, remembering or making decisions? No     Age-appropriate Screening Schedule:  Refer to the list below for future screening recommendations based on patient's age, sex and/or medical conditions. Orders for these recommended tests are listed in the plan section. The patient has been provided with a written plan.    Health Maintenance   Topic Date Due    DXA SCAN  03/02/2021    DIABETIC EYE EXAM  10/18/2022    HEMOGLOBIN A1C  04/30/2024    URINE MICROALBUMIN  06/08/2024    LIPID PANEL  10/30/2024    ANNUAL WELLNESS VISIT  02/01/2025    COLORECTAL CANCER SCREENING  08/17/2027    TDAP/TD VACCINES (3 - Td or Tdap) 05/15/2033    HEPATITIS C SCREENING  Completed    COVID-19 Vaccine  Completed    INFLUENZA VACCINE  Completed    Pneumococcal Vaccine 65+  Completed    ZOSTER VACCINE  Completed        CMS Preventative Services Quick Reference  Risk Factors Identified During Encounter  Chronic Pain: Natural history and expected course discussed. Questions answered.  Depression/Dysphoria: Current medication is effective, no change recommended  Fall Risk-High or Moderate: Discussed Fall Prevention in the home  Tobacco Use/Dependance Risk (use dotphrase .tobaccocessation for documentation)  The above risks/problems have been discussed with the patient.  Pertinent information has been shared with the patient in the After Visit Summary.  An After Visit Summary and PPPS were made available to the patient.    Follow Up:   Next Medicare Wellness visit to be scheduled in 1 year.       Additional E&M Note during same encounter follows:  Patient has multiple medical problems which are significant and separately identifiable that require additional work above and beyond the Medicare Wellness Visit.      Chief Complaint  He returns for a scheduled reassessment of multiple medical problems including previous right frontal cerebral infarcts, type 2 diabetes mellitus, hyperlipidemia, essential hypertension, benign prostatic  hypertrophy, left knee pain, restless leg syndrome, and iron deficiency anemia    Subjective        HPI    Right Frontal Cerebral Infarcts  He was admitted to [facility] on 10/28/2022 after a sudden episode of generalized weakness associated with collapse and apparent involuntary movements of both arms and legs.  He did not lose consciousness and recalls the entire experience.  Noncontrast CT of the brain on admission was reported as showing moderate atrophy and a an old lacunar infarct of the anterior limb of the right internal capsule.  CTA of the brain and carotids was reported as showing a 95% stenosis of the proximal right ICA, a less than 50% stenosis of the proximal left ICA, and changes consistent with advanced centrilobular emphysema.  He underwent a right carotid endarterectomy on 11/3/2022.  MRI of the brain performed on 11/4/2022 revealed moderate atrophy, chronic microvascular changes, and old right basal ganglia and periventricular lacunar infarcts, and small acute infarcts of the right frontal cortex and subcortical white matter.  Echocardiogram done while in hospital was reported as showing sclerotic aortic valve leaflets but no other significant normalities with an estimated EF of 65%.  Ultrasound of the renal arteries was reported as showing a more than 60% stenosis on the left and a nonsignificant stenosis on the right.  He remains on clopidogrel and low-dose ASA along with levetiracetam. Unfortunately he continues to smoke and dips tobacco. He continues to deny any unilateral weakness, numbness, or tingling and there is no history of any loss of vision in either eye or side nor any difficulty talking or understanding what is said to him.  Updated ultrasound of the carotid arteries performed on 5/22/2023 revealed mild plaque but no significant stenosis  Lab Results   Component Value Date    WBC 6.00 10/30/2023    HGB 14.1 10/30/2023    HCT 39.3 10/30/2023    MCV 93.3 10/30/2023     10/30/2023      Type 2 Diabetes Mellitus  He admits to numbness and tingling of the feet.  He has had intermittent episodes of moderate hypoglycemia since last here.  He continues to deny any visual disturbances, polydipsia, polyuria, or foot ulcerations.  He is following an appropriate diet and exercise plan and remains on empagliflozin, GLP agonist -liraglutide and basal insulin (together as xultophy)   Lab Results   Component Value Date    HGBA1C 6.50 (H) 10/30/2023     Lab Results   Component Value Date    TSUYUKBS50 >2000 (H) 10/30/2023     Hyperlipidemia  He remains on atorvastatin and ezetimibe with no apparent side effects  Lab Results   Component Value Date    CHOL 143 05/07/2021    CHLPL 163 10/30/2023    TRIG 46 10/30/2023    HDL 67 (H) 10/30/2023    LDL 86 10/30/2023     Essential Hypertension  He continues to deny any chest pain, palpitations, dyspnea, orthopnea, paroxysmal nocturnal dyspnea or peripheral edema.  He remains on lisinopril and amlodipine.  Lab Results   Component Value Date    GLUCOSE 72 10/30/2023    BUN 17 10/30/2023    CREATININE 0.55 (L) 10/30/2023    EGFRRESULT 102.7 10/30/2023    EGFR 95.5 05/31/2022    BCR 30.9 (H) 10/30/2023    K 4.8 10/30/2023    CO2 26.6 10/30/2023    CALCIUM 9.5 10/30/2023    PROTENTOTREF 6.2 10/30/2023    ALBUMIN 4.3 10/30/2023    BILITOT 0.3 10/30/2023    AST 23 10/30/2023    ALT 25 10/30/2023     Lab Results   Component Value Date    ALKPHOS 59 10/30/2023    ALKPHOS 64 05/07/2021     BPH  He underwent a TURP greenlight by Dr. Barraza on 6/2/2022.  His course was uncomplicated and his bladder catheter removed 4 days afterward.  He complains of frequency, nocturia, and a sense of incomplete voiding with occasional dysuria.  He denies any hematuria.  He underwent a urology reassessment with Griselda Cheng-Akwa APRN on 8/4/2023    Left Knee Pain  He has a long history of intermittent left knee pain associated with intermittent posterior swelling. There is no history of any  stiffness and he continues to deny any giving way or locking.  He gives a history of several implant procedures on his left knee 1 of which was apparently an ACL repair. He is right-hand dominant.  Plain films of the knee performed on 4/28/2023 were reported as showing hardware associated with a previous ACL repair along with joint space narrowing and chondrocalcinosis worse medially.  Ultrasound of the left knee performed on 6/22/2023 was reported as showing a small joint effusion and posterior cystic fluid consistent with a popliteal cyst    Restless Leg Syndrome  He has a long history of intermittent lower extremity discomfort.  He describes this as a tight ache centered about his knees.  Episodes occur primarily at night.  He admits to occasional knee stiffness but denies any swelling.  He is currently on ropinirole and gabapentin but is unconvinced that the latter has helped with l his symptoms.  He denies any apparent side effects    Iron Deficiency Anemia  He is on iron supplementation with no apparent side effects.  He underwent an updated colonoscopy on 8/17/2022 with removal of 7 tubular adenomatous polyps.  He was advised to have his next study in 3 years.  He underwent an EGD by Dr. Navarro on 8/15/2023 with evidence of iron pill gastritis    Labs  Most recent vitamin D 28  Lab Results   Component Value Date    TSH 1.480 10/30/2023     Review of Systems   Constitutional:  Positive for fatigue. Negative for appetite change, chills and fever.   HENT:  Positive for rhinorrhea and sneezing. Negative for congestion, ear pain, postnasal drip, sore throat and voice change.    Eyes:  Negative for visual disturbance.   Respiratory:  Positive for shortness of breath. Negative for cough and wheezing.    Cardiovascular:  Negative for chest pain, palpitations and leg swelling.   Gastrointestinal:  Negative for abdominal pain, blood in stool, constipation, diarrhea, nausea and vomiting.   Genitourinary:  Positive for  "frequency (nocturia x 1-2) and urgency. Negative for dysuria and hematuria.   Musculoskeletal:  Positive for arthralgias and back pain. Negative for joint swelling, myalgias and neck pain.   Skin:  Negative for rash.   Neurological:  Positive for dizziness (with sudden postural changes, particulary getting up from sitting) and numbness (both feet). Negative for weakness.   Psychiatric/Behavioral:  Negative for dysphoric mood and sleep disturbance. The patient is nervous/anxious.      Objective   Vital Signs:  /63   Pulse 80   Temp 98.6 °F (37 °C) (Temporal)   Resp 15   Ht 170.2 cm (67\")   Wt 64.4 kg (142 lb)   SpO2 96%   BMI 22.24 kg/m²     Physical Exam  Constitutional:       General: He is not in acute distress.     Appearance: Normal appearance. He is well-developed. He is not ill-appearing or diaphoretic.      Comments: Bright and in good spirits. No apparent distress. No pallor, jaundice, diaphoresis, or cyanosis.   HENT:      Head: Atraumatic.      Right Ear: Tympanic membrane, ear canal and external ear normal.      Left Ear: Tympanic membrane, ear canal and external ear normal.      Mouth/Throat:      Lips: No lesions.      Mouth: Mucous membranes are moist. No oral lesions.      Pharynx: No oropharyngeal exudate or posterior oropharyngeal erythema.   Eyes:      General: Lids are normal. Gaze aligned appropriately.      Extraocular Movements: Extraocular movements intact.      Conjunctiva/sclera: Conjunctivae normal.      Pupils: Pupils are equal.   Neck:      Thyroid: No thyroid mass or thyromegaly.      Vascular: No carotid bruit or JVD.      Trachea: Trachea normal. No tracheal deviation.   Cardiovascular:      Rate and Rhythm: Normal rate and regular rhythm.      Heart sounds: Normal heart sounds, S1 normal and S2 normal. No murmur heard.     No gallop. No S3 or S4 sounds.   Pulmonary:      Effort: Pulmonary effort is normal.      Breath sounds: Normal breath sounds. Wheezing (diffuse - " mild) present.      Comments: Pulmonary hyperinflation  Abdominal:      General: Bowel sounds are normal. There is no distension or abdominal bruit.      Palpations: Abdomen is soft. There is no hepatomegaly, splenomegaly or mass.      Tenderness: There is no abdominal tenderness.      Hernia: No hernia is present.   Musculoskeletal:      Right lower leg: No edema.      Left lower leg: No edema.      Comments: No peripheral joint redness or warmth.   Lymphadenopathy:      Head:      Right side of head: No submental, submandibular, tonsillar, preauricular, posterior auricular or occipital adenopathy.      Left side of head: No submental, submandibular, tonsillar, preauricular, posterior auricular or occipital adenopathy.      Cervical: No cervical adenopathy.      Upper Body:      Right upper body: No supraclavicular adenopathy.      Left upper body: No supraclavicular adenopathy.   Skin:     General: Skin is warm and dry.      Coloration: Skin is not cyanotic, jaundiced or pale.      Findings: No lesion or rash.      Nails: There is no clubbing.   Neurological:      Mental Status: He is alert and oriented to person, place, and time.      Cranial Nerves: No cranial nerve deficit, dysarthria or facial asymmetry.      Sensory: Sensory deficit (decreased vibration sense toes of both feet) present.      Motor: No tremor.      Coordination: Coordination normal.      Gait: Gait normal.   Psychiatric:         Attention and Perception: Attention normal.         Mood and Affect: Mood normal.         Speech: Speech normal.         Behavior: Behavior normal.         Thought Content: Thought content normal.              Assessment and Plan   Diagnoses and all orders for this visit:    1. Chronic allergic rhinitis    2. Renal artery stenosis  Reminded regarding risk factor modification with an emphasis on tobacco cessation.  Continue dual antiplatelet therapy.    3. Mixed hyperlipidemia  Encouraged to continue to work on his diet  and exercise plan.  Continue current medication    4. History of right-sided carotid endarterectomy  As above.   Continue current medication    5. Essential hypertension  As above.   Continue current medication    6. Coronary artery calcification seen on CT scan  As above.   Continue current medication    7. Atherosclerosis of aorta  As above.   Continue current medication    8. Vitamin D deficiency    9. Type 2 diabetes mellitus without complication, without long-term current use of insulin  Diabetes mellitus Type II, under excellent control.  Diabetes related complications: recent hypoglycemia    Encouraged to continue to pursue ADA diet  Encouraged aerobic exercise.  Reviewed options and agreed on the replacement of insulin degludec-liraglutide with once weekly dulaglutide  Updated labs will be drawn at his return.  -     Dulaglutide (Trulicity) 0.75 MG/0.5ML solution pen-injector; Inject 0.75 mg under the skin into the appropriate area as directed 1 (One) Time Per Week.  Dispense: 6 mL; Refill: 3    10. History of colonic polyps    11. Gastroesophageal reflux disease without esophagitis    12. BPH without obstruction/lower urinary tract symptoms    13. Healthcare maintenance  We will discuss an updated DEXA scan at his return    14. Iron deficiency anemia due to chronic blood loss  Will continue to monitor    15. History of nonmelanoma skin cancer    16. Anxiety associated with depression    17. Osteopenia of multiple sites  Encouraged to continue to pursue weight bearing activities while exercising joint protection.  As above.    18. Mechanical low back pain    19. Chronic pain of left knee    20. RLS (restless legs syndrome)  Continue current medication  Encouraged to report if any worse or if any new symptoms or concerns  -     Urine Drug Screen - Urine, Clean Catch; Future    21. Multiple lacunar infarcts  As above.   Continue current medication.    22. Lumbosacral radiculopathy at S1    23. Cerebral  infarction due to embolism of right anterior cerebral artery  As above.   Continue current medication    24. Mixed type COPD (chronic obstructive pulmonary disease)   COPD is stable.  Reminded of the importance of smoking cessation  Encouraged to remain as active as symptoms allow for    25. Smoker  Lengthy discussion regarding the potential sequela of continued tobacco use and the options with respect to cessation.  Patient uninterested in pursuing at present but will consider.       Follow Up   Return in about 13 weeks (around 5/2/2024).  Patient was given instructions and counseling regarding his condition or for health maintenance advice. Please see specific information pulled into the AVS if appropriate.

## 2024-02-06 DIAGNOSIS — E11.9 TYPE 2 DIABETES MELLITUS WITHOUT COMPLICATION, WITHOUT LONG-TERM CURRENT USE OF INSULIN: ICD-10-CM

## 2024-02-06 RX ORDER — DULAGLUTIDE 0.75 MG/.5ML
0.75 INJECTION, SOLUTION SUBCUTANEOUS WEEKLY
Qty: 6 ML | Refills: 3 | Status: SHIPPED | OUTPATIENT
Start: 2024-02-06

## 2024-02-08 ENCOUNTER — TELEPHONE (OUTPATIENT)
Dept: FAMILY MEDICINE CLINIC | Facility: CLINIC | Age: 79
End: 2024-02-08
Payer: MEDICARE

## 2024-02-08 NOTE — TELEPHONE ENCOUNTER
----- Message from Jan Dudley MD sent at 2/6/2024 10:58 AM EST -----  Cool  Please let patient know that he can stop xultophy and start on once weekly trulicity  His glucose may run a bit high for awhile but will steadily improve    ----- Message -----  From: Ruth Carrero MA  Sent: 2/6/2024   7:54 AM EST  To: Jan Dudley MD    No PA required       ----- Message -----  From: Jan Dudley MD  Sent: 2/1/2024   8:25 PM EST  To: Ruth Carrero MA    Needs PA on Trulicity  Diagnosis -type 2 diabetes mellitus in the context of cardiovascular disease  Will take the place of xultophy

## 2024-02-18 DIAGNOSIS — I10 ESSENTIAL HYPERTENSION: ICD-10-CM

## 2024-02-18 DIAGNOSIS — G25.81 RLS (RESTLESS LEGS SYNDROME): ICD-10-CM

## 2024-02-19 RX ORDER — ROPINIROLE 4 MG/1
TABLET, FILM COATED ORAL
Qty: 270 TABLET | Refills: 0 | Status: SHIPPED | OUTPATIENT
Start: 2024-02-19

## 2024-02-19 RX ORDER — LISINOPRIL 40 MG/1
40 TABLET ORAL DAILY
Qty: 90 TABLET | Refills: 3 | Status: SHIPPED | OUTPATIENT
Start: 2024-02-19

## 2024-03-01 DIAGNOSIS — G25.81 RLS (RESTLESS LEGS SYNDROME): ICD-10-CM

## 2024-03-01 RX ORDER — ROPINIROLE 4 MG/1
TABLET, FILM COATED ORAL
Qty: 270 TABLET | Refills: 0 | OUTPATIENT
Start: 2024-03-01

## 2024-03-01 RX ORDER — ROPINIROLE 4 MG/1
TABLET, FILM COATED ORAL
Qty: 270 TABLET | Refills: 0 | Status: SHIPPED | OUTPATIENT
Start: 2024-03-01

## 2024-03-13 ENCOUNTER — TELEPHONE (OUTPATIENT)
Dept: SURGERY | Facility: CLINIC | Age: 79
End: 2024-03-13
Payer: MEDICARE

## 2024-03-13 NOTE — TELEPHONE ENCOUNTER
Phoned the patient and he stated that he did not want to do the lab at this time. He was moving to [facility] and will have it done there.    ----- Message from RT Antony sent at 3/5/2024  3:03 PM EST -----  This david had an occult blood order on the over due work que.  When I went in his chart I noticed he never followed up after his EGD.  Marisa has tried to contact the patient and mailed a letter but no response.  Besides not have the occult blood done, do care to look at his path? Is there anything I need to do? It does not look like he is on a PPI.  ----- Message -----  From: SYSTEM  Sent: 7/25/2023   1:29 AM EST  To: Sara Braxton [facility]

## 2024-03-13 NOTE — TELEPHONE ENCOUNTER
Phoned patient and he stated that he decided that he didn't want to have the lab done because he is moving to [facility]. He said that he would have it done there when he moves.      ----- Message from Primo Navarro MD sent at 3/6/2024  1:52 PM EST -----  Still would like the Hemoccult to be done.  Pathology on EGD look fine  ----- Message -----  From: Riri Daniels RT  Sent: 3/5/2024   3:22 PM EST  To: Primo Navarro MD      ----- Message -----  From: Riri Daniels RT  Sent: 3/5/2024   3:06 PM EST  To: Sara Braxton [facility]    This david had an occult blood order on the over due work que.  When I went in his chart I noticed he never followed up after his EGD.  Marisa has tried to contact the patient and mailed a letter but no response.  Besides not have the occult blood done, do care to look at his path? Is there anything I need to do? It does not look like he is on a PPI.  ----- Message -----  From: SYSTEM  Sent: 7/25/2023   1:29 AM EST  To: Sara Paul [facility]

## 2024-03-15 NOTE — TELEPHONE ENCOUNTER
Phoned the patient again to let him know that Dr Navarro wanted him to follow up with a GI provider there.  He stated that his PA was referring him to someone. Patient voiced understanding

## 2024-04-04 DIAGNOSIS — F41.8 ANXIETY ASSOCIATED WITH DEPRESSION: Primary | ICD-10-CM

## 2024-04-04 DIAGNOSIS — G25.81 RLS (RESTLESS LEGS SYNDROME): ICD-10-CM

## 2024-04-04 RX ORDER — GABAPENTIN 300 MG/1
300 CAPSULE ORAL NIGHTLY
Qty: 60 CAPSULE | Refills: 0 | Status: SHIPPED | OUTPATIENT
Start: 2024-04-04

## 2024-04-04 RX ORDER — DOXEPIN HYDROCHLORIDE 10 MG/1
CAPSULE ORAL
Qty: 30 CAPSULE | Refills: 5 | Status: SHIPPED | OUTPATIENT
Start: 2024-04-04

## 2024-04-05 DIAGNOSIS — M85.89 OSTEOPENIA OF MULTIPLE SITES: ICD-10-CM

## 2024-04-05 DIAGNOSIS — E55.9 VITAMIN D DEFICIENCY: ICD-10-CM

## 2024-04-05 RX ORDER — ERGOCALCIFEROL 1.25 MG/1
50000 CAPSULE ORAL WEEKLY
Qty: 12 CAPSULE | Refills: 0 | Status: SHIPPED | OUTPATIENT
Start: 2024-04-05

## 2024-04-17 DIAGNOSIS — G25.81 RLS (RESTLESS LEGS SYNDROME): Primary | ICD-10-CM

## 2024-04-17 RX ORDER — GABAPENTIN 600 MG/1
TABLET ORAL
Qty: 90 TABLET | Refills: 2 | Status: SHIPPED | OUTPATIENT
Start: 2024-04-17

## 2024-04-22 DIAGNOSIS — G25.81 RLS (RESTLESS LEGS SYNDROME): Primary | ICD-10-CM

## 2024-04-22 RX ORDER — ROPINIROLE 4 MG/1
TABLET, FILM COATED ORAL
Qty: 60 TABLET | Refills: 0 | Status: SHIPPED | OUTPATIENT
Start: 2024-04-22

## 2024-05-02 ENCOUNTER — OFFICE VISIT (OUTPATIENT)
Dept: FAMILY MEDICINE CLINIC | Facility: CLINIC | Age: 79
End: 2024-05-02
Payer: MEDICARE

## 2024-05-02 VITALS
HEIGHT: 67 IN | BODY MASS INDEX: 20.25 KG/M2 | WEIGHT: 129 LBS | OXYGEN SATURATION: 98 % | TEMPERATURE: 98.7 F | HEART RATE: 84 BPM | RESPIRATION RATE: 14 BRPM | DIASTOLIC BLOOD PRESSURE: 65 MMHG | SYSTOLIC BLOOD PRESSURE: 128 MMHG

## 2024-05-02 DIAGNOSIS — K21.9 GASTROESOPHAGEAL REFLUX DISEASE WITHOUT ESOPHAGITIS: ICD-10-CM

## 2024-05-02 DIAGNOSIS — I10 ESSENTIAL HYPERTENSION: ICD-10-CM

## 2024-05-02 DIAGNOSIS — Z85.828 HISTORY OF NONMELANOMA SKIN CANCER: ICD-10-CM

## 2024-05-02 DIAGNOSIS — I25.10 CORONARY ARTERY CALCIFICATION SEEN ON CT SCAN: ICD-10-CM

## 2024-05-02 DIAGNOSIS — R35.0 BENIGN PROSTATIC HYPERPLASIA WITH URINARY FREQUENCY: ICD-10-CM

## 2024-05-02 DIAGNOSIS — I63.81 MULTIPLE LACUNAR INFARCTS: ICD-10-CM

## 2024-05-02 DIAGNOSIS — N40.1 BENIGN PROSTATIC HYPERPLASIA WITH INCOMPLETE BLADDER EMPTYING: ICD-10-CM

## 2024-05-02 DIAGNOSIS — E78.49 OTHER HYPERLIPIDEMIA: ICD-10-CM

## 2024-05-02 DIAGNOSIS — Z86.010 HISTORY OF COLONIC POLYPS: ICD-10-CM

## 2024-05-02 DIAGNOSIS — E11.9 TYPE 2 DIABETES MELLITUS WITHOUT COMPLICATION, WITHOUT LONG-TERM CURRENT USE OF INSULIN: ICD-10-CM

## 2024-05-02 DIAGNOSIS — E29.1 ANDROGEN DEFICIENCY: ICD-10-CM

## 2024-05-02 DIAGNOSIS — E55.9 VITAMIN D DEFICIENCY: ICD-10-CM

## 2024-05-02 DIAGNOSIS — M25.562 CHRONIC PAIN OF LEFT KNEE: ICD-10-CM

## 2024-05-02 DIAGNOSIS — E78.2 MIXED HYPERLIPIDEMIA: ICD-10-CM

## 2024-05-02 DIAGNOSIS — G25.81 RLS (RESTLESS LEGS SYNDROME): ICD-10-CM

## 2024-05-02 DIAGNOSIS — J30.9 CHRONIC ALLERGIC RHINITIS: Primary | ICD-10-CM

## 2024-05-02 DIAGNOSIS — M85.89 OSTEOPENIA OF MULTIPLE SITES: ICD-10-CM

## 2024-05-02 DIAGNOSIS — M54.59 MECHANICAL LOW BACK PAIN: ICD-10-CM

## 2024-05-02 DIAGNOSIS — F41.8 ANXIETY ASSOCIATED WITH DEPRESSION: ICD-10-CM

## 2024-05-02 DIAGNOSIS — I70.1 RENAL ARTERY STENOSIS: ICD-10-CM

## 2024-05-02 DIAGNOSIS — D50.0 IRON DEFICIENCY ANEMIA DUE TO CHRONIC BLOOD LOSS: ICD-10-CM

## 2024-05-02 DIAGNOSIS — I70.0 ATHEROSCLEROSIS OF AORTA: ICD-10-CM

## 2024-05-02 DIAGNOSIS — G89.29 CHRONIC PAIN OF LEFT KNEE: ICD-10-CM

## 2024-05-02 DIAGNOSIS — N40.0 BPH WITHOUT OBSTRUCTION/LOWER URINARY TRACT SYMPTOMS: ICD-10-CM

## 2024-05-02 DIAGNOSIS — J44.9 MIXED TYPE COPD (CHRONIC OBSTRUCTIVE PULMONARY DISEASE): ICD-10-CM

## 2024-05-02 DIAGNOSIS — N40.1 BENIGN PROSTATIC HYPERPLASIA WITH URINARY FREQUENCY: ICD-10-CM

## 2024-05-02 DIAGNOSIS — F17.200 SMOKER: ICD-10-CM

## 2024-05-02 DIAGNOSIS — R39.14 BENIGN PROSTATIC HYPERPLASIA WITH INCOMPLETE BLADDER EMPTYING: ICD-10-CM

## 2024-05-02 DIAGNOSIS — I63.421 CEREBRAL INFARCTION DUE TO EMBOLISM OF RIGHT ANTERIOR CEREBRAL ARTERY: ICD-10-CM

## 2024-05-02 DIAGNOSIS — Z98.890 HISTORY OF RIGHT-SIDED CAROTID ENDARTERECTOMY: ICD-10-CM

## 2024-05-02 RX ORDER — MONTELUKAST SODIUM 10 MG/1
10 TABLET ORAL DAILY
Qty: 90 TABLET | Refills: 3 | Status: SHIPPED | OUTPATIENT
Start: 2024-05-02

## 2024-05-02 RX ORDER — DULAGLUTIDE 0.75 MG/.5ML
0.75 INJECTION, SOLUTION SUBCUTANEOUS WEEKLY
Qty: 6 ML | Refills: 3 | Status: SHIPPED | OUTPATIENT
Start: 2024-05-02

## 2024-05-02 RX ORDER — CLOPIDOGREL BISULFATE 75 MG/1
75 TABLET ORAL DAILY
Qty: 90 TABLET | Refills: 3 | Status: SHIPPED | OUTPATIENT
Start: 2024-05-02

## 2024-05-02 RX ORDER — ASPIRIN 81 MG/1
81 TABLET ORAL DAILY
Qty: 90 TABLET | Refills: 3 | Status: SHIPPED | OUTPATIENT
Start: 2024-05-02

## 2024-05-02 RX ORDER — DOXEPIN HYDROCHLORIDE 10 MG/1
CAPSULE ORAL
Qty: 30 CAPSULE | Refills: 5 | Status: SHIPPED | OUTPATIENT
Start: 2024-05-02

## 2024-05-02 RX ORDER — ROPINIROLE 4 MG/1
TABLET, FILM COATED ORAL
Qty: 60 TABLET | Refills: 0 | Status: CANCELLED | OUTPATIENT
Start: 2024-05-02

## 2024-05-02 RX ORDER — DULOXETIN HYDROCHLORIDE 60 MG/1
120 CAPSULE, DELAYED RELEASE ORAL DAILY
Qty: 180 CAPSULE | Refills: 3 | Status: SHIPPED | OUTPATIENT
Start: 2024-05-02

## 2024-05-02 RX ORDER — LEVETIRACETAM 1000 MG/1
1000 TABLET ORAL 2 TIMES DAILY
Qty: 180 TABLET | Refills: 3 | Status: SHIPPED | OUTPATIENT
Start: 2024-05-02

## 2024-05-02 RX ORDER — LANSOPRAZOLE 30 MG/1
30 CAPSULE, DELAYED RELEASE ORAL DAILY
Qty: 90 CAPSULE | Refills: 3 | Status: SHIPPED | OUTPATIENT
Start: 2024-05-02

## 2024-05-02 RX ORDER — FLUTICASONE PROPIONATE 50 MCG
2 SPRAY, SUSPENSION (ML) NASAL DAILY
Qty: 16 G | Refills: 5 | Status: SHIPPED | OUTPATIENT
Start: 2024-05-02

## 2024-05-02 RX ORDER — AMLODIPINE BESYLATE 10 MG/1
10 TABLET ORAL DAILY
Qty: 90 TABLET | Refills: 3 | Status: SHIPPED | OUTPATIENT
Start: 2024-05-02

## 2024-05-02 RX ORDER — TAMSULOSIN HYDROCHLORIDE 0.4 MG/1
1 CAPSULE ORAL DAILY
Qty: 90 CAPSULE | Refills: 3 | Status: SHIPPED | OUTPATIENT
Start: 2024-05-02

## 2024-05-02 RX ORDER — ROPINIROLE 6 MG/1
6 TABLET, FILM COATED, EXTENDED RELEASE ORAL NIGHTLY
Qty: 30 TABLET | Refills: 5 | Status: SHIPPED | OUTPATIENT
Start: 2024-05-02

## 2024-05-02 RX ORDER — LISINOPRIL 40 MG/1
40 TABLET ORAL DAILY
Qty: 90 TABLET | Refills: 3 | Status: SHIPPED | OUTPATIENT
Start: 2024-05-02

## 2024-05-02 RX ORDER — EZETIMIBE 10 MG/1
10 TABLET ORAL DAILY
Qty: 90 TABLET | Refills: 3 | Status: SHIPPED | OUTPATIENT
Start: 2024-05-02

## 2024-05-02 RX ORDER — ATORVASTATIN CALCIUM 80 MG/1
80 TABLET, FILM COATED ORAL NIGHTLY
Qty: 90 TABLET | Refills: 3 | Status: SHIPPED | OUTPATIENT
Start: 2024-05-02

## 2024-05-02 NOTE — PROGRESS NOTES
Subjective   Torin Granados is a 78 y.o. male.     Chief Complaint  He returns for a scheduled reassessment of multiple medical problems including previous cerebral infarcts, type 2 diabetes mellitus, hyperlipidemia, essential hypertension, benign prostatic hypertrophy, left knee pain, restless leg syndrome, and previous iron deficiency anemia    History of Present Illness     Right Frontal Cerebral Infarcts  He was admitted to [facility] on 10/28/2022 after a sudden episode of generalized weakness associated with collapse and apparent involuntary movements of both arms and legs.  He did not lose consciousness and recalls the entire experience.  Noncontrast CT of the brain on admission was reported as showing moderate atrophy and a an old lacunar infarct of the anterior limb of the right internal capsule.  CTA of the brain and carotids was reported as showing a 95% stenosis of the proximal right ICA, a less than 50% stenosis of the proximal left ICA, and changes consistent with advanced centrilobular emphysema.  He underwent a right carotid endarterectomy on 11/3/2022.  MRI of the brain performed on 11/4/2022 revealed moderate atrophy, chronic microvascular changes, and old right basal ganglia and periventricular lacunar infarcts, and small acute infarcts of the right frontal cortex and subcortical white matter.  Echocardiogram done while in hospital was reported as showing sclerotic aortic valve leaflets but no other significant normalities with an estimated EF of 65%.  Ultrasound of the renal arteries was reported as showing a more than 60% stenosis on the left and a nonsignificant stenosis on the right.  He remains on clopidogrel and low-dose ASA along with levetiracetam. Unfortunately he continues to smoke and dip tobacco. He continues to deny any unilateral weakness, numbness, or tingling and there is no history of any loss of vision in either eye or side nor any difficulty talking or understanding what is said to  him.  Updated ultrasound of the carotid arteries performed on 5/22/2023 revealed mild plaque but no significant stenosis    Type 2 Diabetes Mellitus  He admits to numbness and tingling of the feet, as well as intermittent polydipsia.  He continues to deny any visual disturbances, polyuria, hypoglycemia or foot ulcerations.  He is following an appropriate diet and exercise plan and is currently prescribed empagliflozin and dulaglutide    Hyperlipidemia  He remains on atorvastatin and ezetimibe with no apparent side effects    Essential Hypertension  He continues to deny any chest pain, palpitations, dyspnea, orthopnea, paroxysmal nocturnal dyspnea or peripheral edema.  He remains on lisinopril and amlodipine.    BPH  He underwent a TURP greenlight by Dr. Barraza on 6/2/2022.  His course was uncomplicated and his bladder catheter removed 4 days afterward.  He complains of frequency, nocturia, and a sense of incomplete voiding with occasional dysuria.  He denies any hematuria.  He underwent a urology reassessment with Griselda Cheng-Akwa APRN on 8/4/2023    Left Knee Pain  He has a long history of intermittent left knee pain associated with intermittent posterior swelling. There is no history of any stiffness and he continues to deny any giving way or locking.  He gives a history of several implant procedures on his left knee 1 of which was apparently an ACL repair. He is right-hand dominant.  Plain films of the knee performed on 4/28/2023 were reported as showing hardware associated with a previous ACL repair along with joint space narrowing and chondrocalcinosis worse medially.  Ultrasound of the left knee performed on 6/22/2023 was reported as showing a small joint effusion and posterior cystic fluid consistent with a popliteal cyst    Restless Leg Syndrome  He has a long history of intermittent lower extremity discomfort.  He describes this as a tight ache centered about his knees.  Episodes occur primarily at  night.  He admits to occasional knee stiffness but denies any swelling.  He is currently on ropinirole and gabapentin.  Efforts have been made to taper and discontinue the former due to potential accentuation of his symptoms but they then become intolerable    Iron Deficiency Anemia  He is on iron supplementation with no apparent side effects.  He underwent an updated colonoscopy on 8/17/2022 with removal of 7 tubular adenomatous polyps.  He was advised to have his next study in 3 years.  He underwent an EGD by Dr. Navarro on 8/15/2023 with evidence of iron pill gastritis    The following portions of the patient's history were reviewed and updated as appropriate: allergies, current medications, past medical history, past social history, and problem list.    Review of Systems   Constitutional:  Negative for chills, fatigue, fever and unexpected weight loss.   HENT:  Positive for rhinorrhea and sneezing. Negative for congestion, hearing loss, postnasal drip, sinus pressure, sore throat and voice change.    Eyes:  Positive for blurred vision. Negative for visual disturbance.   Respiratory:  Positive for shortness of breath. Negative for cough and wheezing.    Cardiovascular:  Negative for chest pain, palpitations and leg swelling.   Gastrointestinal:  Negative for abdominal pain, blood in stool, constipation, diarrhea, nausea, vomiting and GERD.   Endocrine: Positive for polydipsia. Negative for polyuria.   Genitourinary:  Positive for frequency and nocturia (1-2). Negative for dysuria, hematuria and urgency.   Musculoskeletal:  Positive for arthralgias and back pain. Negative for joint swelling, myalgias and neck pain.   Skin:  Negative for rash.   Neurological:  Positive for tremors, numbness (feet) and confusion (difficulty with concentration at times). Negative for speech difficulty and weakness.   Psychiatric/Behavioral:  Positive for decreased concentration. Negative for sleep disturbance and depressed mood. The  patient is nervous/anxious.      Objective   Physical Exam  Constitutional:       General: He is not in acute distress.     Appearance: Normal appearance. He is well-developed. He is not ill-appearing or diaphoretic.      Comments: Bright and in good spirits. No apparent distress. No pallor, jaundice, diaphoresis, or cyanosis.   HENT:      Head: Atraumatic.      Right Ear: External ear normal. There is impacted cerumen.      Left Ear: External ear normal. There is impacted cerumen.      Mouth/Throat:      Lips: No lesions.      Mouth: Mucous membranes are moist. No oral lesions.      Pharynx: No oropharyngeal exudate or posterior oropharyngeal erythema.   Eyes:      General: Lids are normal. Gaze aligned appropriately.      Extraocular Movements: Extraocular movements intact.      Conjunctiva/sclera: Conjunctivae normal.      Pupils: Pupils are equal.   Neck:      Thyroid: No thyroid mass or thyromegaly.      Vascular: No carotid bruit or JVD.      Trachea: Trachea normal. No tracheal deviation.   Cardiovascular:      Rate and Rhythm: Normal rate and regular rhythm.      Heart sounds: Normal heart sounds, S1 normal and S2 normal. No murmur heard.     No gallop. No S3 or S4 sounds.   Pulmonary:      Effort: Pulmonary effort is normal.      Breath sounds: Wheezing (diffuse - mild) present.      Comments: Pulmonary hyperinflation  Abdominal:      General: Bowel sounds are normal. There is no distension.   Musculoskeletal:      Right lower leg: No edema.      Left lower leg: No edema.      Comments: No peripheral joint redness or warmth.   Lymphadenopathy:      Head:      Right side of head: No submental, submandibular, tonsillar, preauricular, posterior auricular or occipital adenopathy.      Left side of head: No submental, submandibular, tonsillar, preauricular, posterior auricular or occipital adenopathy.      Cervical: No cervical adenopathy.      Upper Body:      Right upper body: No supraclavicular adenopathy.       Left upper body: No supraclavicular adenopathy.   Skin:     General: Skin is warm and dry.      Coloration: Skin is not cyanotic, jaundiced or pale.      Findings: No lesion or rash.      Nails: There is no clubbing.   Neurological:      Mental Status: He is alert and oriented to person, place, and time.      Cranial Nerves: No cranial nerve deficit, dysarthria or facial asymmetry.      Sensory: Sensory deficit (decreased vibration sense toes of both feet) present.      Motor: No tremor.      Coordination: Coordination normal.      Gait: Gait normal.   Psychiatric:         Attention and Perception: Attention normal.         Mood and Affect: Mood normal.         Speech: Speech normal.         Behavior: Behavior normal.         Thought Content: Thought content normal.       Assessment & Plan   Problems Addressed this Visit          Allergies and Adverse Reactions    Chronic allergic rhinitis  Continue current medication  Encouraged to report if any worse or if any new symptoms or concerns.    Relevant Medications    fluticasone (FLONASE) 50 MCG/ACT nasal spray       Cardiac and Vasculature    Atherosclerosis of aorta  Reminded regarding risk factor modification with an emphasis on tobacco cessation.  Continue dual antiplatelet therapy.    Relevant Medications    aspirin 81 MG EC tablet    Other Relevant Orders    CBC & Differential    Coronary artery calcification seen on CT scan  As above.    Relevant Medications    amLODIPine (NORVASC) 10 MG tablet    aspirin 81 MG EC tablet    clopidogrel (PLAVIX) 75 MG tablet    Other Relevant Orders    CBC & Differential    Essential hypertension   Hypertension: at goal. Evidence of target organ damage:  Previous right LUZ cerebral infarcts post carotid endarterectomy, previous lacunar infarcts, renal artery stenosis, and evidence of aortic atherosclerosis and coronary artery calcifications on previous imaging .  Encouraged to continue to work on diet and exercise plan.    Continue current medication    Relevant Medications    amLODIPine (NORVASC) 10 MG tablet    lisinopril (PRINIVIL,ZESTRIL) 40 MG tablet    Other Relevant Orders    Comprehensive Metabolic Panel    TSH    History of right-sided carotid endarterectomy  As above.  Continue current medication    Relevant Medications    clopidogrel (PLAVIX) 75 MG tablet    Other Relevant Orders    Ambulatory Referral to Neurology    Mixed hyperlipidemia  As above.   Continue current medication.    Relevant Medications    atorvastatin (LIPITOR) 80 MG tablet    ezetimibe (ZETIA) 10 MG tablet    Other Relevant Orders    Comprehensive Metabolic Panel    Lipid Panel    TSH    Renal artery stenosis    Relevant Orders    CBC & Differential    Comprehensive Metabolic Panel       Endocrine and Metabolic    Androgen deficiency    Type 2 diabetes mellitus without complication, without long-term current use of insulin  Diabetes mellitus Type II, under unknown control.   Encouraged to continue to pursue ADA diet  Encouraged aerobic exercise.  Continue current medication  Updated labs drawn.  Will arrange an updated diabetic eye exam    Relevant Medications    Dulaglutide (Trulicity) 0.75 MG/0.5ML solution pen-injector    empagliflozin (Jardiance) 10 MG tablet tablet    Other Relevant Orders    Comprehensive Metabolic Panel    TSH    Hemoglobin A1c    MicroAlbumin, Urine, Random - Urine, Clean Catch    Ambulatory Referral for Diabetic Eye Exam-Optometry    Vitamin D deficiency    Relevant Orders    Vitamin D,25-Hydroxy       Gastrointestinal Abdominal     Gastroesophageal reflux disease without esophagitis    Relevant Medications    lansoprazole (PREVACID) 30 MG capsule    History of colonic polyps       Genitourinary and Reproductive     BPH without obstruction/lower urinary tract symptoms  Continue current medication  Encouraged to report if any worse or if any new symptoms or concerns.    Relevant Medications    tamsulosin (FLOMAX) 0.4 MG capsule  24 hr capsule       Hematology and Neoplasia    History of nonmelanoma skin cancer    Iron deficiency anemia due to chronic blood loss  Will continue to monitor    Relevant Orders    CBC & Differential    Vitamin B12    Iron    Transferrin    Folate RBC    Ferritin       Mental Health    Anxiety associated with depression    Relevant Medications    doxepin (SINEquan) 10 MG capsule    DULoxetine (CYMBALTA) 60 MG capsule       Musculoskeletal and Injuries    Chronic pain of left knee    Mechanical low back pain    Osteopenia of multiple sites       Neuro    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   Continue current medication.  Patient agrees today to a neurology assessment and this will be arranged    Relevant Orders    CBC & Differential    Ambulatory Referral to Neurology    Multiple lacunar infarcts  As above.    Relevant Medications    levETIRAcetam (KEPPRA) 1000 MG tablet    Other Relevant Orders    CBC & Differential    Ambulatory Referral to Neurology    RLS (restless legs syndrome)  As above.  Agreed on a change in ropinirole to the extended release formulation 6 mg nightly while awaiting neurology opinion    Relevant Medications    rOPINIRole XL (REQUIP XL) 6 MG tablet sustained-release 24 hour 24 hr tablet    Other Relevant Orders    Ambulatory Referral to Neurology       Pulmonary and Pneumonias    Mixed type COPD (chronic obstructive pulmonary disease)   COPD is stable.  Encouraged to remain as active as symptoms allow for  Continue current medication    Relevant Medications    fluticasone (FLONASE) 50 MCG/ACT nasal spray    montelukast (SINGULAIR) 10 MG tablet       Tobacco    Smoker     Diagnoses         Codes Comments    Chronic allergic rhinitis    -  Primary ICD-10-CM: J30.9  ICD-9-CM: 477.9     Renal artery stenosis     ICD-10-CM: I70.1  ICD-9-CM: 440.1     Mixed hyperlipidemia     ICD-10-CM: E78.2  ICD-9-CM: 272.2     History of right-sided carotid  endarterectomy     ICD-10-CM: Z98.890  ICD-9-CM: V45.89     Essential hypertension     ICD-10-CM: I10  ICD-9-CM: 401.9     Coronary artery calcification seen on CT scan     ICD-10-CM: I25.10  ICD-9-CM: 414.00     Atherosclerosis of aorta     ICD-10-CM: I70.0  ICD-9-CM: 440.0     Vitamin D deficiency     ICD-10-CM: E55.9  ICD-9-CM: 268.9     Type 2 diabetes mellitus without complication, without long-term current use of insulin     ICD-10-CM: E11.9  ICD-9-CM: 250.00     Androgen deficiency     ICD-10-CM: E29.1  ICD-9-CM: 257.2     History of colonic polyps     ICD-10-CM: Z86.010  ICD-9-CM: V12.72     Gastroesophageal reflux disease without esophagitis     ICD-10-CM: K21.9  ICD-9-CM: 530.81     BPH without obstruction/lower urinary tract symptoms     ICD-10-CM: N40.0  ICD-9-CM: 600.00     History of nonmelanoma skin cancer     ICD-10-CM: Z85.828  ICD-9-CM: V10.83     Osteopenia of multiple sites     ICD-10-CM: M85.89  ICD-9-CM: 733.90     Mechanical low back pain     ICD-10-CM: M54.59  ICD-9-CM: 724.2     Chronic pain of left knee     ICD-10-CM: M25.562, G89.29  ICD-9-CM: 719.46, 338.29     RLS (restless legs syndrome)     ICD-10-CM: G25.81  ICD-9-CM: 333.94     Multiple lacunar infarcts     ICD-10-CM: I63.81  ICD-9-CM: 434.91     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     ICD-10-CM: I63.421  ICD-9-CM: 434.11     Mixed type COPD (chronic obstructive pulmonary disease)     ICD-10-CM: J44.9  ICD-9-CM: 496     Smoker     ICD-10-CM: F17.200  ICD-9-CM: 305.1     Iron deficiency anemia due to chronic blood loss     ICD-10-CM: D50.0  ICD-9-CM: 280.0     Anxiety associated with depression     ICD-10-CM: F41.8  ICD-9-CM: 300.4     Other hyperlipidemia     ICD-10-CM: E78.49  ICD-9-CM: 272.4     Benign prostatic hyperplasia with urinary frequency     ICD-10-CM: N40.1, R35.0  ICD-9-CM: 600.01, 788.41     Benign prostatic hyperplasia with incomplete bladder emptying     ICD-10-CM: N40.1,  "R39.14  ICD-9-CM: 600.01, 788.21     BPH without obstruction/lower urinary tract symptoms     ICD-10-CM: N40.0  ICD-9-CM: 600.00 The patient will be scheduled for cystoscopy with Dr. Tenorio to evaluate for TURP.  Flomax 1 capsule daily, may increase to 2 capsules.  PSA ordered.          I spent 42 minutes caring for Torin \"Marvin\" Darwin on this date of service. This time includes time spent by me in the following activities:reviewing tests, performing a medically appropriate examination and/or evaluation , counseling and educating the patient/family/caregiver, ordering medications, tests, or procedures and documenting information in the medical record              "

## 2024-05-03 LAB
25(OH)D3+25(OH)D2 SERPL-MCNC: 66.2 NG/ML (ref 30–100)
ALBUMIN SERPL-MCNC: 4 G/DL (ref 3.5–5.2)
ALBUMIN/GLOB SERPL: 1.9 G/DL
ALP SERPL-CCNC: 76 U/L (ref 39–117)
ALT SERPL-CCNC: 31 U/L (ref 1–41)
AST SERPL-CCNC: 27 U/L (ref 1–40)
BASOPHILS # BLD AUTO: 0.02 10*3/MM3 (ref 0–0.2)
BASOPHILS NFR BLD AUTO: 0.3 % (ref 0–1.5)
BILIRUB SERPL-MCNC: 0.4 MG/DL (ref 0–1.2)
BUN SERPL-MCNC: 14 MG/DL (ref 8–23)
BUN/CREAT SERPL: 23.3 (ref 7–25)
CALCIUM SERPL-MCNC: 8.9 MG/DL (ref 8.6–10.5)
CHLORIDE SERPL-SCNC: 101 MMOL/L (ref 98–107)
CHOLEST SERPL-MCNC: 120 MG/DL (ref 0–200)
CO2 SERPL-SCNC: 22.2 MMOL/L (ref 22–29)
CREAT SERPL-MCNC: 0.6 MG/DL (ref 0.76–1.27)
EGFRCR SERPLBLD CKD-EPI 2021: 100 ML/MIN/1.73
EOSINOPHIL # BLD AUTO: 0.05 10*3/MM3 (ref 0–0.4)
EOSINOPHIL NFR BLD AUTO: 0.8 % (ref 0.3–6.2)
ERYTHROCYTE [DISTWIDTH] IN BLOOD BY AUTOMATED COUNT: 14 % (ref 12.3–15.4)
FERRITIN SERPL-MCNC: 389 NG/ML (ref 30–400)
FOLATE BLD-MCNC: >620 NG/ML
FOLATE RBC-MCNC: >1505 NG/ML
GLOBULIN SER CALC-MCNC: 2.1 GM/DL
GLUCOSE SERPL-MCNC: 151 MG/DL (ref 65–99)
HBA1C MFR BLD: 7.9 % (ref 4.8–5.6)
HCT VFR BLD AUTO: 41.2 % (ref 37.5–51)
HDLC SERPL-MCNC: 55 MG/DL (ref 40–60)
HGB BLD-MCNC: 13.8 G/DL (ref 13–17.7)
IMM GRANULOCYTES # BLD AUTO: 0.03 10*3/MM3 (ref 0–0.05)
IMM GRANULOCYTES NFR BLD AUTO: 0.5 % (ref 0–0.5)
IRON SERPL-MCNC: 127 MCG/DL (ref 59–158)
LDLC SERPL CALC-MCNC: 51 MG/DL (ref 0–100)
LYMPHOCYTES # BLD AUTO: 1.62 10*3/MM3 (ref 0.7–3.1)
LYMPHOCYTES NFR BLD AUTO: 25.4 % (ref 19.6–45.3)
MCH RBC QN AUTO: 32.1 PG (ref 26.6–33)
MCHC RBC AUTO-ENTMCNC: 33.5 G/DL (ref 31.5–35.7)
MCV RBC AUTO: 95.8 FL (ref 79–97)
MONOCYTES # BLD AUTO: 0.55 10*3/MM3 (ref 0.1–0.9)
MONOCYTES NFR BLD AUTO: 8.6 % (ref 5–12)
NEUTROPHILS # BLD AUTO: 4.11 10*3/MM3 (ref 1.7–7)
NEUTROPHILS NFR BLD AUTO: 64.4 % (ref 42.7–76)
NRBC BLD AUTO-RTO: 0 /100 WBC (ref 0–0.2)
PLATELET # BLD AUTO: 301 10*3/MM3 (ref 140–450)
POTASSIUM SERPL-SCNC: 4.3 MMOL/L (ref 3.5–5.2)
PROT SERPL-MCNC: 6.1 G/DL (ref 6–8.5)
RBC # BLD AUTO: 4.3 10*6/MM3 (ref 4.14–5.8)
SODIUM SERPL-SCNC: 135 MMOL/L (ref 136–145)
TRANSFERRIN SERPL-MCNC: 207 MG/DL (ref 177–329)
TRIGL SERPL-MCNC: 65 MG/DL (ref 0–150)
TSH SERPL DL<=0.005 MIU/L-ACNC: 0.82 UIU/ML (ref 0.27–4.2)
VIT B12 SERPL-MCNC: >2000 PG/ML (ref 211–946)
VLDLC SERPL CALC-MCNC: 14 MG/DL (ref 5–40)
WBC # BLD AUTO: 6.38 10*3/MM3 (ref 3.4–10.8)

## 2024-06-25 DIAGNOSIS — E55.9 VITAMIN D DEFICIENCY: ICD-10-CM

## 2024-06-25 DIAGNOSIS — M85.89 OSTEOPENIA OF MULTIPLE SITES: ICD-10-CM

## 2024-06-25 RX ORDER — ERGOCALCIFEROL 1.25 MG/1
50000 CAPSULE ORAL WEEKLY
Qty: 12 CAPSULE | Refills: 0 | Status: SHIPPED | OUTPATIENT
Start: 2024-06-25

## 2024-07-07 RX ORDER — FLUCONAZOLE 150 MG/1
150 TABLET ORAL EVERY OTHER DAY
Qty: 3 TABLET | Refills: 0 | Status: SHIPPED | OUTPATIENT
Start: 2024-07-07

## 2024-07-07 RX ORDER — CLOTRIMAZOLE 1 %
1 CREAM (GRAM) TOPICAL 2 TIMES DAILY
Qty: 40 G | Refills: 0 | Status: SHIPPED | OUTPATIENT
Start: 2024-07-07

## 2024-07-22 DIAGNOSIS — G25.81 RLS (RESTLESS LEGS SYNDROME): ICD-10-CM

## 2024-07-22 RX ORDER — ROPINIROLE 6 MG/1
6 TABLET, FILM COATED, EXTENDED RELEASE ORAL NIGHTLY
Qty: 30 TABLET | Refills: 5 | Status: SHIPPED | OUTPATIENT
Start: 2024-07-22 | End: 2024-07-23 | Stop reason: SDUPTHER

## 2024-07-22 RX ORDER — GABAPENTIN 600 MG/1
TABLET ORAL
Qty: 90 TABLET | Refills: 2 | Status: SHIPPED | OUTPATIENT
Start: 2024-07-22

## 2024-07-22 NOTE — TELEPHONE ENCOUNTER
"    Caller: Torin Granados \"Marvin\"    Relationship: Self    Best call back number: [phone]    Requested Prescriptions:   Requested Prescriptions     Pending Prescriptions Disp Refills    rOPINIRole XL (REQUIP XL) 6 MG tablet sustained-release 24 hour 24 hr tablet 30 tablet 5     Sig: Take 1 tablet by mouth Every Night.        Pharmacy where request should be sent:  [address] - [phone] [facility] [phone] FX        Last office visit with prescribing clinician: 5/2/2024   Last telemedicine visit with prescribing clinician: Visit date not found   Next office visit with prescribing clinician: 10/28/2024     Additional details provided by patient: PT REQUEST TO HAVE RX DIRECTIONS CHANGED TO 1-3 TABLETS A DAY    Does the patient have less than a 3 day supply:  [x] Yes  [] No    Would you like a call back once the refill request has been completed: [] Yes [x] No    If the office needs to give you a call back, can they leave a voicemail: [] Yes [x] No    David Kiser Rep   07/22/24 15:54 EDT         "

## 2024-07-23 DIAGNOSIS — G25.81 RLS (RESTLESS LEGS SYNDROME): ICD-10-CM

## 2024-07-23 RX ORDER — ROPINIROLE 6 MG/1
6 TABLET, FILM COATED, EXTENDED RELEASE ORAL NIGHTLY
Qty: 90 TABLET | Refills: 3 | Status: SHIPPED | OUTPATIENT
Start: 2024-07-23

## 2024-07-23 RX ORDER — ROPINIROLE 6 MG/1
6 TABLET, FILM COATED, EXTENDED RELEASE ORAL NIGHTLY
Qty: 30 TABLET | Refills: 5 | Status: CANCELLED | OUTPATIENT
Start: 2024-07-23

## 2024-07-24 DIAGNOSIS — Z98.890 HISTORY OF RIGHT-SIDED CAROTID ENDARTERECTOMY: ICD-10-CM

## 2024-07-24 DIAGNOSIS — G25.81 RLS (RESTLESS LEGS SYNDROME): Primary | ICD-10-CM

## 2024-07-24 DIAGNOSIS — I63.81 MULTIPLE LACUNAR INFARCTS: ICD-10-CM

## 2024-07-24 DIAGNOSIS — I63.421 CEREBRAL INFARCTION DUE TO EMBOLISM OF RIGHT ANTERIOR CEREBRAL ARTERY: ICD-10-CM

## 2024-08-02 DIAGNOSIS — E11.9 TYPE 2 DIABETES MELLITUS WITHOUT COMPLICATION, WITHOUT LONG-TERM CURRENT USE OF INSULIN: Primary | ICD-10-CM

## 2024-08-02 RX ORDER — ROPINIROLE 2 MG/1
2 TABLET, FILM COATED ORAL NIGHTLY
Qty: 30 TABLET | Refills: 5 | Status: SHIPPED | OUTPATIENT
Start: 2024-08-02

## 2024-08-08 DIAGNOSIS — E11.9 TYPE 2 DIABETES MELLITUS WITHOUT COMPLICATION, WITHOUT LONG-TERM CURRENT USE OF INSULIN: ICD-10-CM

## 2024-08-08 RX ORDER — ROPINIROLE 2 MG/1
2 TABLET, FILM COATED ORAL 3 TIMES DAILY
Qty: 90 TABLET | Refills: 2 | Status: SHIPPED | OUTPATIENT
Start: 2024-08-08

## 2024-08-29 ENCOUNTER — OFFICE VISIT (OUTPATIENT)
Dept: FAMILY MEDICINE CLINIC | Facility: CLINIC | Age: 79
End: 2024-08-29
Payer: MEDICARE

## 2024-08-29 VITALS
DIASTOLIC BLOOD PRESSURE: 50 MMHG | TEMPERATURE: 98.6 F | OXYGEN SATURATION: 96 % | RESPIRATION RATE: 15 BRPM | SYSTOLIC BLOOD PRESSURE: 104 MMHG | WEIGHT: 121 LBS | HEART RATE: 83 BPM | HEIGHT: 67 IN | BODY MASS INDEX: 18.99 KG/M2

## 2024-08-29 DIAGNOSIS — I70.0 ATHEROSCLEROSIS OF AORTA: ICD-10-CM

## 2024-08-29 DIAGNOSIS — I70.1 RENAL ARTERY STENOSIS: Primary | ICD-10-CM

## 2024-08-29 DIAGNOSIS — F17.200 SMOKER: ICD-10-CM

## 2024-08-29 DIAGNOSIS — I10 ESSENTIAL HYPERTENSION: ICD-10-CM

## 2024-08-29 DIAGNOSIS — E11.9 TYPE 2 DIABETES MELLITUS WITHOUT COMPLICATION, WITHOUT LONG-TERM CURRENT USE OF INSULIN: ICD-10-CM

## 2024-08-29 DIAGNOSIS — E11.9 TYPE 2 DIABETES MELLITUS WITHOUT COMPLICATION, WITH LONG-TERM CURRENT USE OF INSULIN: ICD-10-CM

## 2024-08-29 DIAGNOSIS — F41.8 ANXIETY ASSOCIATED WITH DEPRESSION: ICD-10-CM

## 2024-08-29 DIAGNOSIS — E78.2 MIXED HYPERLIPIDEMIA: ICD-10-CM

## 2024-08-29 DIAGNOSIS — G25.81 RLS (RESTLESS LEGS SYNDROME): ICD-10-CM

## 2024-08-29 DIAGNOSIS — Z98.890 HISTORY OF RIGHT-SIDED CAROTID ENDARTERECTOMY: ICD-10-CM

## 2024-08-29 DIAGNOSIS — J44.9 MIXED TYPE COPD (CHRONIC OBSTRUCTIVE PULMONARY DISEASE): ICD-10-CM

## 2024-08-29 DIAGNOSIS — I25.10 CORONARY ARTERY CALCIFICATION SEEN ON CT SCAN: ICD-10-CM

## 2024-08-29 DIAGNOSIS — Z79.4 TYPE 2 DIABETES MELLITUS WITHOUT COMPLICATION, WITH LONG-TERM CURRENT USE OF INSULIN: ICD-10-CM

## 2024-08-29 DIAGNOSIS — D50.0 IRON DEFICIENCY ANEMIA DUE TO CHRONIC BLOOD LOSS: ICD-10-CM

## 2024-08-29 DIAGNOSIS — I63.421 CEREBRAL INFARCTION DUE TO EMBOLISM OF RIGHT ANTERIOR CEREBRAL ARTERY: ICD-10-CM

## 2024-08-29 DIAGNOSIS — Z00.00 HEALTHCARE MAINTENANCE: ICD-10-CM

## 2024-08-29 DIAGNOSIS — I63.81 MULTIPLE LACUNAR INFARCTS: ICD-10-CM

## 2024-08-29 LAB
EXPIRATION DATE: ABNORMAL
HBA1C MFR BLD: 15 % (ref 4.5–5.7)
Lab: ABNORMAL

## 2024-08-29 RX ORDER — LANCETS 30 GAUGE
EACH MISCELLANEOUS
Qty: 90 EACH | Refills: 5 | Status: SHIPPED | OUTPATIENT
Start: 2024-08-29

## 2024-08-29 RX ORDER — AMLODIPINE BESYLATE 10 MG/1
5 TABLET ORAL DAILY
Start: 2024-08-29

## 2024-08-29 RX ORDER — (INSULIN DEGLUDEC AND LIRAGLUTIDE) 100; 3.6 [IU]/ML; MG/ML
16 INJECTION, SOLUTION SUBCUTANEOUS DAILY
COMMUNITY
End: 2024-08-29 | Stop reason: SDUPTHER

## 2024-08-29 RX ORDER — (INSULIN DEGLUDEC AND LIRAGLUTIDE) 100; 3.6 [IU]/ML; MG/ML
15 INJECTION, SOLUTION SUBCUTANEOUS DAILY
Qty: 6 ML | Refills: 2 | Status: SHIPPED | OUTPATIENT
Start: 2024-08-29

## 2024-08-29 RX ORDER — PEN NEEDLE, DIABETIC 32GX 5/32"
1 NEEDLE, DISPOSABLE MISCELLANEOUS DAILY
Qty: 30 EACH | Refills: 2 | Status: SHIPPED | OUTPATIENT
Start: 2024-08-29

## 2024-08-29 RX ORDER — GLUCOSAMINE HCL/CHONDROITIN SU 500-400 MG
CAPSULE ORAL
Qty: 90 EACH | Refills: 5 | Status: SHIPPED | OUTPATIENT
Start: 2024-08-29

## 2024-08-29 RX ORDER — BLOOD-GLUCOSE METER
1 KIT MISCELLANEOUS ONCE
Qty: 1 EACH | Refills: 0 | Status: SHIPPED | OUTPATIENT
Start: 2024-08-29 | End: 2024-08-29

## 2024-08-29 RX ORDER — ACYCLOVIR 400 MG/1
1 TABLET ORAL
Qty: 3 EACH | Refills: 5 | Status: SHIPPED | OUTPATIENT
Start: 2024-08-29

## 2024-08-29 RX ORDER — ACYCLOVIR 400 MG/1
1 TABLET ORAL ONCE
Qty: 1 EACH | Refills: 0 | Status: SHIPPED | OUTPATIENT
Start: 2024-08-29 | End: 2024-08-29

## 2024-08-29 NOTE — PROGRESS NOTES
Subjective   Torin Granados is a 78 y.o. male.     Chief Complaint  He returns for a scheduled reassessment of multiple medical problems including previous cerebral infarcts, type 2 diabetes mellitus, hyperlipidemia, essential hypertension, benign prostatic hypertrophy, left knee pain, restless leg syndrome, and iron deficiency anemia    History of Present Illness     Right Frontal Cerebral Infarcts  He was admitted to [facility] on 10/28/2022 after a sudden episode of generalized weakness associated with collapse and apparent involuntary movements of both arms and legs.  He did not lose consciousness and recalls the entire experience.  Noncontrast CT of the brain on admission was reported as showing moderate atrophy and a an old lacunar infarct of the anterior limb of the right internal capsule.  CTA of the brain and carotids was reported as showing a 95% stenosis of the proximal right ICA, a less than 50% stenosis of the proximal left ICA, and changes consistent with advanced centrilobular emphysema.  He underwent a right carotid endarterectomy on 11/3/2022.  MRI of the brain performed on 11/4/2022 revealed moderate atrophy, chronic microvascular changes, and old right basal ganglia and periventricular lacunar infarcts, and small acute infarcts of the right frontal cortex and subcortical white matter.  Echocardiogram done while in hospital was reported as showing sclerotic aortic valve leaflets but no other significant normalities with an estimated EF of 65%.  Ultrasound of the renal arteries was reported as showing a more than 60% stenosis on the left and a nonsignificant stenosis on the right.  He remains on clopidogrel and low-dose ASA along with levetiracetam. Unfortunately he continues to smoke and dip tobacco. He continues to deny any unilateral weakness, numbness, or tingling and there is no history of any loss of vision in either eye or side nor any difficulty talking or understanding what is said to him.   Updated ultrasound of the carotid arteries performed on 5/22/2023 revealed mild plaque but no significant stenosis.  He is scheduled to undergo a stroke neurology reassessment with ISAIAS Bowden on 12/11/2024  Lab Results   Component Value Date    WBC 6.38 05/02/2024    HGB 13.8 05/02/2024    HCT 41.2 05/02/2024    MCV 95.8 05/02/2024     05/02/2024     Type 2 Diabetes Mellitus  He admits to numbness and tingling of the feet, as well as intermittent polydipsia.  He continues to deny any visual disturbances, polyuria, hypoglycemia or foot ulcerations.  He stopped dulaglutide nearly 2 months ago due to progressive weight loss and feels that he has already gained some weight back.  He remains on empagliflozin alone.  Lab Results   Component Value Date    HGBA1C 15.0 (A) 08/29/2024     Lab Results   Component Value Date    ALRMGNZV12 >2000 (H) 05/02/2024     Hyperlipidemia  He remains on atorvastatin and ezetimibe with no apparent side effects  Lab Results   Component Value Date    CHOL 143 05/07/2021    CHLPL 120 05/02/2024    TRIG 65 05/02/2024    HDL 55 05/02/2024    LDL 51 05/02/2024     Essential Hypertension  He continues to deny any chest pain, palpitations, dyspnea, orthopnea, paroxysmal nocturnal dyspnea or peripheral edema.  He remains on lisinopril and amlodipine.  Lab Results   Component Value Date    GLUCOSE 151 (H) 05/02/2024    BUN 14 05/02/2024    CREATININE 0.60 (L) 05/02/2024     (L) 05/02/2024    K 4.3 05/02/2024     05/02/2024    CALCIUM 8.9 05/02/2024    PROTEINTOT 7.3 05/07/2021    ALBUMIN 4.0 05/02/2024    ALT 31 05/02/2024    AST 27 05/02/2024    ALKPHOS 76 05/02/2024    BILITOT 0.4 05/02/2024    GLOB 2.8 05/07/2021    AGRATIO 1.6 05/07/2021    BCR 23.3 05/02/2024    ANIONGAP 11.0 05/31/2022    EGFR 95.5 05/31/2022     BPH  He underwent a TURP greenlight by Dr. Barraza on 6/2/2022.  His course was uncomplicated and his bladder catheter removed 4 days afterward.  He  complains of frequency, nocturia, and a sense of incomplete voiding with occasional dysuria.  He denies any hematuria.  He underwent a urology reassessment with Griselda Cheng-Akwa APRN on 8/4/2023    Left Knee Pain  He has a long history of intermittent left knee pain associated with intermittent posterior swelling. There is no history of any stiffness and he continues to deny any giving way or locking.  He gives a history of several implant procedures on his left knee 1 of which was apparently an ACL repair. He is right-hand dominant.  Plain films of the knee performed on 4/28/2023 were reported as showing hardware associated with a previous ACL repair along with joint space narrowing and chondrocalcinosis worse medially.  Ultrasound of the left knee performed on 6/22/2023 was reported as showing a small joint effusion and posterior cystic fluid consistent with a popliteal cyst    Restless Leg Syndrome  He has a long history of intermittent lower extremity discomfort.  He describes this as a tight ache centered about his knees.  Episodes occur primarily at night.  He admits to occasional knee stiffness but denies any swelling.  He is currently on ropinirole and gabapentin.  Efforts have been made to taper and discontinue the former due to potential accentuation of his symptoms but they then become intolerable    Iron Deficiency Anemia  He is on iron supplementation with no apparent side effects.  He underwent an updated colonoscopy on 8/17/2022 with removal of 7 tubular adenomatous polyps.  He was advised to have his next study in 3 years.  He underwent an EGD by Dr. Navarro on 8/15/2023 with evidence of iron pill gastritis  Lab Results   Component Value Date    TRANSFERRIN 207 05/02/2024    FERRITIN 389.00 05/02/2024     Labs  Most recent vitamin D 66.2  Lab Results   Component Value Date    TSH 0.817 05/02/2024     The following portions of the patient's history were reviewed and updated as appropriate:  allergies, current medications, past medical history, past social history, and problem list.    Review of Systems   Constitutional:  Negative for chills, fatigue, fever and unexpected weight loss.   HENT:  Positive for rhinorrhea and sneezing. Negative for congestion, hearing loss, postnasal drip, sinus pressure, sore throat and voice change.    Eyes:  Positive for blurred vision. Negative for visual disturbance.   Respiratory:  Positive for shortness of breath. Negative for cough and wheezing.    Cardiovascular:  Negative for chest pain, palpitations and leg swelling.   Gastrointestinal:  Negative for abdominal pain, blood in stool, constipation, diarrhea, nausea, vomiting and GERD.   Endocrine: Positive for polydipsia. Negative for polyuria.   Genitourinary:  Positive for frequency and nocturia (1-2). Negative for dysuria, hematuria and urgency.   Musculoskeletal:  Positive for arthralgias and back pain. Negative for joint swelling, myalgias and neck pain.   Skin:  Negative for rash.   Neurological:  Positive for tremors, numbness (feet) and confusion (difficulty with concentration at times). Negative for speech difficulty and weakness.   Psychiatric/Behavioral:  Positive for decreased concentration. Negative for sleep disturbance and depressed mood. The patient is nervous/anxious.      Objective   Physical Exam  Constitutional:       General: He is not in acute distress.     Appearance: Normal appearance. He is well-developed. He is not ill-appearing or diaphoretic.      Comments: Bright and in good spirits. No apparent distress. No pallor, jaundice, diaphoresis, or cyanosis.   HENT:      Head: Atraumatic.      Right Ear: Tympanic membrane, ear canal and external ear normal.      Left Ear: Tympanic membrane, ear canal and external ear normal.      Mouth/Throat:      Lips: No lesions.      Mouth: Mucous membranes are moist. No oral lesions.      Pharynx: No oropharyngeal exudate or posterior oropharyngeal  erythema.   Eyes:      General: Lids are normal. Gaze aligned appropriately.      Extraocular Movements: Extraocular movements intact.      Conjunctiva/sclera: Conjunctivae normal.      Pupils: Pupils are equal.   Neck:      Thyroid: No thyroid mass or thyromegaly.      Vascular: No carotid bruit or JVD.      Trachea: Trachea normal. No tracheal deviation.   Cardiovascular:      Rate and Rhythm: Normal rate and regular rhythm.      Heart sounds: Normal heart sounds, S1 normal and S2 normal. No murmur heard.     No gallop. No S3 or S4 sounds.   Pulmonary:      Effort: Pulmonary effort is normal.      Breath sounds: Wheezing (diffuse - mild) present.      Comments: Pulmonary hyperinflation  Abdominal:      General: Bowel sounds are normal. There is no distension.   Musculoskeletal:      Right lower leg: No edema.      Left lower leg: No edema.      Comments: No peripheral joint redness or warmth.   Lymphadenopathy:      Head:      Right side of head: No submental, submandibular, tonsillar, preauricular, posterior auricular or occipital adenopathy.      Left side of head: No submental, submandibular, tonsillar, preauricular, posterior auricular or occipital adenopathy.      Cervical: No cervical adenopathy.      Upper Body:      Right upper body: No supraclavicular adenopathy.      Left upper body: No supraclavicular adenopathy.   Skin:     General: Skin is warm and dry.      Coloration: Skin is not cyanotic, jaundiced or pale.      Findings: No lesion or rash.      Nails: There is no clubbing.   Neurological:      Mental Status: He is alert and oriented to person, place, and time.      Cranial Nerves: No cranial nerve deficit, dysarthria or facial asymmetry.      Sensory: Sensory deficit (decreased vibration sense toes of both feet) present.      Motor: No tremor.      Coordination: Coordination normal.      Gait: Gait normal.   Psychiatric:         Attention and Perception: Attention normal.         Mood and Affect:  Mood normal.         Speech: Speech normal.         Behavior: Behavior normal.         Thought Content: Thought content normal.       Assessment & Plan   Problems Addressed this Visit          Cardiac and Vasculature    Atherosclerosis of aorta  Reminded regarding risk factor modification with an emphasis on tobacco cessation.  Continue dual antiplatelet therapy.    Coronary artery calcification seen on CT scan     As above.   Continue current medication.        Essential hypertension   Hypertension:  BP a bit low today .   Encouraged to continue to work on diet and exercise plan.   Amlodipine will be reduced to 5 daily    Relevant Medications    amLODIPine (NORVASC) 10 MG tablet    History of right-sided carotid endarterectomy    Mixed hyperlipidemia  As above.   Continue current medication.    Renal artery stenosis   As above.   Continue current medication.       Endocrine and Metabolic    Type 2 diabetes mellitus without complication, with long-term current use of insulin  Diabetes mellitus Type II, under poor control.   Encouraged to continue to pursue ADA diet  Encouraged aerobic exercise.  Reviewed options and agreed on a reintroduction of insulin degludec-liraglutide at a dose of 15 units daily reminded of the potential symptoms and signs of hypoglycemia and the appropriate action to take  Patient would benefit from a CGM and efforts will be made to obtain    Relevant Medications    Insulin Degludec-Liraglutide (Xultophy) 100-3.6 UNIT-MG/ML solution pen-injector subcutaneous pen    Insulin Pen Needle (Insupen Pen Needles) 32G X 4 MM misc    Lancets misc    Glucose Blood (Blood Glucose Test) strip    glucose monitor monitoring kit    Continuous Glucose  (Dexcom G7 ) device    Continuous Glucose Sensor (Dexcom G7 Sensor) misc       Health Encounters    Healthcare maintenance  Recommended COVID and influenza vaccinations this fall.       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   Continue current medication.  Follow up with stroke neurology    Multiple lacunar infarcts  As above.    RLS (restless legs syndrome)  Limited response to gabapentin and patient is very reluctant to taper off of ropinirole even though it is likely potentiating his symptoms  Referral to general neurology    Relevant Orders    Ambulatory Referral to Neurology       Pulmonary and Pneumonias    Mixed type COPD (chronic obstructive pulmonary disease)   COPD is stable.  Reminded of the importance of smoking cessation  Encouraged to remain as active as symptoms allow for         Tobacco    Smoker     Diagnoses         Codes Comments    Renal artery stenosis    -  Primary ICD-10-CM: I70.1  ICD-9-CM: 440.1     Mixed hyperlipidemia     ICD-10-CM: E78.2  ICD-9-CM: 272.2     History of right-sided carotid endarterectomy     ICD-10-CM: Z98.890  ICD-9-CM: V45.89     Essential hypertension     ICD-10-CM: I10  ICD-9-CM: 401.9     Coronary artery calcification seen on CT scan     ICD-10-CM: I25.10  ICD-9-CM: 414.00     Atherosclerosis of aorta     ICD-10-CM: I70.0  ICD-9-CM: 440.0     Type 2 diabetes mellitus without complication, without long-term current use of insulin     ICD-10-CM: E11.9  ICD-9-CM: 250.00     Healthcare maintenance     ICD-10-CM: Z00.00  ICD-9-CM: V70.0     Iron deficiency anemia due to chronic blood loss     ICD-10-CM: D50.0  ICD-9-CM: 280.0     Anxiety associated with depression     ICD-10-CM: F41.8  ICD-9-CM: 300.4     RLS (restless legs syndrome)     ICD-10-CM: G25.81  ICD-9-CM: 333.94     Multiple lacunar infarcts     ICD-10-CM: I63.81  ICD-9-CM: 434.91     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 This time includes time spent by me in the following activities:reviewing tests, performing a medically appropriate examination and/or evaluation , counseling and educating the patient/family/caregiver, ordering medications, tests, or procedures and documenting information in the medical record              "

## 2024-09-03 ENCOUNTER — TELEPHONE (OUTPATIENT)
Dept: FAMILY MEDICINE CLINIC | Facility: CLINIC | Age: 79
End: 2024-09-03
Payer: MEDICARE

## 2024-09-03 NOTE — TELEPHONE ENCOUNTER
Spoke with patient and let him know Dr. Dudley has sent him in a Dexcom and he can pick it up from his pharmacy. I let him know that if he wants me to help him put it on, he can come by the office tomorrow. Patient said that he would do that.

## 2024-09-27 ENCOUNTER — TELEPHONE (OUTPATIENT)
Dept: FAMILY MEDICINE CLINIC | Facility: CLINIC | Age: 79
End: 2024-09-27

## 2024-09-27 DIAGNOSIS — E55.9 VITAMIN D DEFICIENCY: ICD-10-CM

## 2024-09-27 DIAGNOSIS — M85.89 OSTEOPENIA OF MULTIPLE SITES: ICD-10-CM

## 2024-09-27 DIAGNOSIS — E11.9 TYPE 2 DIABETES MELLITUS WITHOUT COMPLICATION, WITHOUT LONG-TERM CURRENT USE OF INSULIN: ICD-10-CM

## 2024-09-27 RX ORDER — ACYCLOVIR 400 MG/1
1 TABLET ORAL
Qty: 9 EACH | Refills: 3 | Status: SHIPPED | OUTPATIENT
Start: 2024-09-27

## 2024-09-27 RX ORDER — ERGOCALCIFEROL 1.25 MG/1
50000 CAPSULE, LIQUID FILLED ORAL WEEKLY
Qty: 12 CAPSULE | Refills: 0 | Status: SHIPPED | OUTPATIENT
Start: 2024-09-27

## 2024-09-27 NOTE — TELEPHONE ENCOUNTER
"Caller: Torin Granados \"Marvin\"    Relationship: Self    Best call back number: [phone]    Which medication are you concerned about:     Continuous Glucose Sensor (Dexcom G7 Sensor) misc     What are your concerns:     HIS DEXCOM G7 IS STATING IT GIVES 24 HOURS TO REPLACE     "

## 2024-09-29 DIAGNOSIS — G25.81 RLS (RESTLESS LEGS SYNDROME): Primary | ICD-10-CM

## 2024-09-29 RX ORDER — PREGABALIN 50 MG/1
50 CAPSULE ORAL 2 TIMES DAILY
Qty: 60 CAPSULE | Refills: 0 | Status: SHIPPED | OUTPATIENT
Start: 2024-09-29

## 2024-10-22 ENCOUNTER — TELEPHONE (OUTPATIENT)
Dept: FAMILY MEDICINE CLINIC | Facility: CLINIC | Age: 79
End: 2024-10-22

## 2024-10-22 NOTE — TELEPHONE ENCOUNTER
"Caller: Torin Granados \"Marvin\"     Relationship:      Best call back number: [phone]    What is your medical concern?     SCIATIC NERVE     CAN PATIENT GET A SHOT AT OUR OFFICE OR EMERGENCY ROOM FOR PAIN?  "

## 2024-10-23 ENCOUNTER — OFFICE VISIT (OUTPATIENT)
Dept: FAMILY MEDICINE CLINIC | Facility: CLINIC | Age: 79
End: 2024-10-23
Payer: MEDICARE

## 2024-10-23 VITALS
SYSTOLIC BLOOD PRESSURE: 118 MMHG | HEART RATE: 72 BPM | OXYGEN SATURATION: 97 % | BODY MASS INDEX: 19.78 KG/M2 | WEIGHT: 126 LBS | RESPIRATION RATE: 20 BRPM | DIASTOLIC BLOOD PRESSURE: 60 MMHG | TEMPERATURE: 97.5 F | HEIGHT: 67 IN

## 2024-10-23 DIAGNOSIS — E11.9 TYPE 2 DIABETES MELLITUS WITHOUT COMPLICATION, WITH LONG-TERM CURRENT USE OF INSULIN: Primary | ICD-10-CM

## 2024-10-23 DIAGNOSIS — Z79.4 TYPE 2 DIABETES MELLITUS WITHOUT COMPLICATION, WITH LONG-TERM CURRENT USE OF INSULIN: Primary | ICD-10-CM

## 2024-10-23 DIAGNOSIS — M54.31 SCIATICA OF RIGHT SIDE: ICD-10-CM

## 2024-10-23 DIAGNOSIS — Z23 NEED FOR INFLUENZA VACCINATION: ICD-10-CM

## 2024-10-23 LAB
EXPIRATION DATE: ABNORMAL
HBA1C MFR BLD: 9.1 % (ref 4.5–5.7)
Lab: ABNORMAL

## 2024-10-23 PROCEDURE — 96372 THER/PROPH/DIAG INJ SC/IM: CPT | Performed by: PHYSICIAN ASSISTANT

## 2024-10-23 PROCEDURE — 1160F RVW MEDS BY RX/DR IN RCRD: CPT | Performed by: PHYSICIAN ASSISTANT

## 2024-10-23 PROCEDURE — 90662 IIV NO PRSV INCREASED AG IM: CPT | Performed by: PHYSICIAN ASSISTANT

## 2024-10-23 PROCEDURE — 3078F DIAST BP <80 MM HG: CPT | Performed by: PHYSICIAN ASSISTANT

## 2024-10-23 PROCEDURE — 3074F SYST BP LT 130 MM HG: CPT | Performed by: PHYSICIAN ASSISTANT

## 2024-10-23 PROCEDURE — 99214 OFFICE O/P EST MOD 30 MIN: CPT | Performed by: PHYSICIAN ASSISTANT

## 2024-10-23 PROCEDURE — G0008 ADMIN INFLUENZA VIRUS VAC: HCPCS | Performed by: PHYSICIAN ASSISTANT

## 2024-10-23 PROCEDURE — 1159F MED LIST DOCD IN RCRD: CPT | Performed by: PHYSICIAN ASSISTANT

## 2024-10-23 PROCEDURE — 3046F HEMOGLOBIN A1C LEVEL >9.0%: CPT | Performed by: PHYSICIAN ASSISTANT

## 2024-10-23 PROCEDURE — 83036 HEMOGLOBIN GLYCOSYLATED A1C: CPT | Performed by: PHYSICIAN ASSISTANT

## 2024-10-23 PROCEDURE — 1125F AMNT PAIN NOTED PAIN PRSNT: CPT | Performed by: PHYSICIAN ASSISTANT

## 2024-10-23 RX ORDER — KETOROLAC TROMETHAMINE 30 MG/ML
60 INJECTION, SOLUTION INTRAMUSCULAR; INTRAVENOUS ONCE
Status: COMPLETED | OUTPATIENT
Start: 2024-10-23 | End: 2024-10-23

## 2024-10-23 RX ORDER — DEXAMETHASONE SODIUM PHOSPHATE 4 MG/ML
4 INJECTION, SOLUTION INTRA-ARTICULAR; INTRALESIONAL; INTRAMUSCULAR; INTRAVENOUS; SOFT TISSUE ONCE
Status: COMPLETED | OUTPATIENT
Start: 2024-10-23 | End: 2024-10-23

## 2024-10-23 RX ADMIN — DEXAMETHASONE SODIUM PHOSPHATE 4 MG: 4 INJECTION, SOLUTION INTRA-ARTICULAR; INTRALESIONAL; INTRAMUSCULAR; INTRAVENOUS; SOFT TISSUE at 11:49

## 2024-10-23 RX ADMIN — KETOROLAC TROMETHAMINE 60 MG: 30 INJECTION, SOLUTION INTRAMUSCULAR; INTRAVENOUS at 11:48

## 2024-10-23 NOTE — PROGRESS NOTES
Subjective        Chief Complaint  Leg Pain (Right sciatica pain)    Subjective      Torin Granados is a 79 y.o. male who presents today to [facility] for Leg Pain (Right sciatica pain). He has a past medical history of COPD, IDDM type II, HTN, HLD, PAD, GERD, BPH, iron deficiency anemia, anxiety, depression, DDD, and sciatica.    Leg Pain (Right sciatica pain):  He reports a recent flare of his chronic right side sciatica.  He takes Lyrica 50 mg twice daily and would like to have this increased if possible.  He is requesting a Toradol and steroid injection today as these have helped with his sciatica in the past.    IDDM type II:  Currently managed on Xultophy 15 units daily, Jardiance 10 mg daily.  Most recent HA1C >15% on 8/29/2024.  He reports his sugar mostly runs low.  He uses a Dexcom 7 and reports glucose readings down to 50 at times.  As a times around 200.  Glucose generally comes up quickly if he drinks a sugary beverage.  He does report having glucagon pens for hypoglycemia if needed.      Current Outpatient Medications:     amLODIPine (NORVASC) 10 MG tablet, Take 0.5 tablets by mouth Daily., Disp: , Rfl:     aspirin 81 MG EC tablet, Take 1 tablet by mouth Daily., Disp: 90 tablet, Rfl: 3    atorvastatin (LIPITOR) 80 MG tablet, Take 1 tablet by mouth Every Night., Disp: 90 tablet, Rfl: 3    clopidogrel (PLAVIX) 75 MG tablet, Take 1 tablet by mouth Daily., Disp: 90 tablet, Rfl: 3    Continuous Glucose Sensor (Dexcom G7 Sensor) misc, Use 1 each Every 10 (Ten) Days., Disp: 9 each, Rfl: 3    doxepin (SINEquan) 10 MG capsule, 1 capsule 2-3 hours before bedtime, Disp: 30 capsule, Rfl: 5    DULoxetine (CYMBALTA) 60 MG capsule, Take 2 capsules by mouth Daily., Disp: 180 capsule, Rfl: 3    empagliflozin (Jardiance) 10 MG tablet tablet, Take 1 tablet by mouth Every Morning., Disp: 90 tablet, Rfl: 3    ezetimibe (ZETIA) 10 MG tablet, Take 1 tablet by mouth Daily., Disp: 90  tablet, Rfl: 3    ferrous sulfate 325 (65 FE) MG EC tablet, 1 po daily for one week then 1 po twice daily afterward, Disp: 60 tablet, Rfl: 2    fluticasone (FLONASE) 50 MCG/ACT nasal spray, 2 sprays into the nostril(s) as directed by provider Daily. Administer 2 sprays both nostrils once daily, Disp: 16 g, Rfl: 5    Glucose Blood (Blood Glucose Test) strip, 1 three times daily, Disp: 90 each, Rfl: 5    Insulin Degludec-Liraglutide (Xultophy) 100-3.6 UNIT-MG/ML solution pen-injector subcutaneous pen, Inject 15 Units under the skin into the appropriate area as directed Daily., Disp: 6 mL, Rfl: 2    Insulin Pen Needle (Insupen Pen Needles) 32G X 4 MM misc, Use 1 each Daily., Disp: 30 each, Rfl: 2    Lancets misc, 1 three times daily, Disp: 90 each, Rfl: 5    lansoprazole (PREVACID) 30 MG capsule, Take 1 capsule by mouth Daily., Disp: 90 capsule, Rfl: 3    levETIRAcetam (KEPPRA) 1000 MG tablet, Take 1 tablet by mouth 2 (Two) Times a Day., Disp: 180 tablet, Rfl: 3    lisinopril (PRINIVIL,ZESTRIL) 40 MG tablet, Take 1 tablet by mouth Daily., Disp: 90 tablet, Rfl: 3    montelukast (SINGULAIR) 10 MG tablet, Take 1 tablet by mouth Daily., Disp: 90 tablet, Rfl: 3    pregabalin (Lyrica) 50 MG capsule, Take 1 capsule by mouth 2 (Two) Times a Day., Disp: 60 capsule, Rfl: 0    rOPINIRole (REQUIP) 2 MG tablet, Take 1 tablet by mouth 3 times a day. Take 1 hour before bedtime, Disp: 90 tablet, Rfl: 2    tamsulosin (FLOMAX) 0.4 MG capsule 24 hr capsule, Take 1 capsule by mouth Daily., Disp: 90 capsule, Rfl: 3    triamcinolone (KENALOG) 0.1 % cream, Apply 1 application  topically to the appropriate area as directed 2 (Two) Times a Day., Disp: 80 g, Rfl: 5    vitamin C (ASCORBIC ACID) 500 MG tablet, 1 po with each dose of ferrous sulfate, Disp: 60 tablet, Rfl: 2    vitamin D (ERGOCALCIFEROL) 1.25 MG (11476 UT) capsule capsule, Take 1 capsule by mouth 1 (One) Time Per Week., Disp: 12 capsule, Rfl: 0    Current Facility-Administered  "Medications:     dexAMETHasone (DECADRON) injection 4 mg, 4 mg, Intramuscular, Once, Merissa Haro PA    ketorolac (TORADOL) injection 60 mg, 60 mg, Intramuscular, Once, Merissa Haro PA      No Known Allergies    Objective     Objective   Vital Signs:  /60   Pulse 72   Temp 97.5 °F (36.4 °C) (Temporal)   Resp 20   Ht 170.2 cm (67.01\")   Wt 57.2 kg (126 lb)   SpO2 97%   BMI 19.73 kg/m²   Estimated body mass index is 19.73 kg/m² as calculated from the following:    Height as of this encounter: 170.2 cm (67.01\").    Weight as of this encounter: 57.2 kg (126 lb).    BMI is within normal parameters. No other follow-up for BMI required.    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    Colon polyp     COPD (chronic obstructive pulmonary disease)     Coronary artery calcification seen on CT scan 06/08/2017    Deep vein thrombosis 2022    Depression     Diabetes mellitus     Enlarged prostate     Hyperlipidemia     Hypertension     Iron deficiency anemia due to chronic blood loss 06/20/2023    Seizures 2020    Stroke 2020    Substance abuse recovering    Ulcer      Past Surgical History:   Procedure Laterality Date    BRAIN SURGERY  less than year    COLONOSCOPY      over 5years ago    COLONOSCOPY N/A 03/29/2017    Procedure: Colonoscopy  CPTCODE: 08596;  Surgeon: Rduy Velez III, MD;  Location: [facility] OR;  Service:     COLONOSCOPY N/A 08/17/2022    Procedure: COLONOSCOPY with polypectomy x 8;  Surgeon: Abad Agudelo MD;  Location: [facility] ENDOSCOPY;  Service: Gastroenterology;  Laterality: N/A;    CYSTOSCOPY TRANSURETHRAL RESECTION OF PROSTATE N/A 06/02/2022    Procedure: CYSTOSCOPY TRANSURETHRAL RESECTION OF PROSTATE GREENLIGHT;  Surgeon: " "Greg Barraza MD;  Location: [facility] OR;  Service: Urology;  Laterality: N/A;    ENDOSCOPY N/A 8/15/2023    Procedure: ESOPHAGOGASTRODUODENOSCOPY;  Surgeon: Primo Navarro MD;  Location: [facility] ENDOSCOPY;  Service: Gastroenterology;  Laterality: N/A;    EYE SURGERY  long time ago    FRACTURE SURGERY      KNEE SURGERY Left     \"3-4 surgeries on left knee\" per pt    SKIN BIOPSY      melanoma on face    VASECTOMY  1970     Social History     Socioeconomic History    Marital status:      Spouse name: emmie    Number of children: 1    Years of education: 20   Tobacco Use    Smoking status: Some Days     Current packs/day: 1.00     Average packs/day: 1 pack/day for 15.5 years (15.5 ttl pk-yrs)     Types: Cigarettes     Start date: 1/1/2019     Passive exposure: Current    Smokeless tobacco: Current     Types: Snuff   Vaping Use    Vaping status: Never Used   Substance and Sexual Activity    Alcohol use: Not Currently     Comment: recoveringn alcoholic, no drink in 25 yrs    Drug use: No    Sexual activity: Defer      Physical Exam  Vitals and nursing note reviewed.   Constitutional:       General: He is not in acute distress.     Appearance: He is well-developed. He is not diaphoretic.   HENT:      Head: Normocephalic and atraumatic.   Eyes:      General: No scleral icterus.        Right eye: No discharge.         Left eye: No discharge.      Conjunctiva/sclera: Conjunctivae normal.   Cardiovascular:      Rate and Rhythm: Normal rate and regular rhythm.      Heart sounds: Normal heart sounds. No murmur heard.     No friction rub. No gallop.   Pulmonary:      Effort: Pulmonary effort is normal. No respiratory distress.      Breath sounds: Normal breath sounds. No wheezing or rales.   Chest:      Chest wall: No tenderness.   Musculoskeletal:         General: Tenderness (Right hip and lateral RLE.) present. Normal range of motion.      Cervical back: Normal range of motion and neck supple.   Skin:     General: " Skin is warm and dry.      Coloration: Skin is not pale.      Findings: No erythema or rash.   Neurological:      Mental Status: He is alert and oriented to person, place, and time.   Psychiatric:         Behavior: Behavior normal.        Result Review :  The following data was reviewed by: MICHAEL Anguiano on 10/23/2024:  Hemoglobin A1C   Date Value Ref Range Status   10/23/2024 9.1 (A) 4.5 - 5.7 % Final   05/07/2021 7.30 (H) 4.80 - 5.60 % Final     TSH   Date Value Ref Range Status   05/02/2024 0.817 0.270 - 4.200 uIU/mL Final   10/29/2022 2.570 0.358 - 3.740 uIU/mL Final     HDL Cholesterol   Date Value Ref Range Status   05/02/2024 55 40 - 60 mg/dL Final   05/07/2021 61 (H) 40 - 60 mg/dL Final     LDL Chol Calc (NIH)   Date Value Ref Range Status   05/02/2024 51 0 - 100 mg/dL Final     Triglycerides   Date Value Ref Range Status   05/02/2024 65 0 - 150 mg/dL Final   05/07/2021 68 0 - 150 mg/dL Final     Total Cholesterol   Date Value Ref Range Status   05/02/2024 120 0 - 200 mg/dL Final     Comment:     Cholesterol Reference Ranges  (U.S. Department of Health and Human Services ATP III  Classifications)  Desirable          <200 mg/dL  Borderline High    200-239 mg/dL  High Risk          >240 mg/dL  Triglyceride Reference Ranges  (U.S. Department of Health and Human Services ATP III  Classifications)  Normal           <150 mg/dL  Borderline High  150-199 mg/dL  High             200-499 mg/dL  Very High        >500 mg/dL  HDL Reference Ranges  (U.S. Department of Health and Human Services ATP III  Classifications)  Low     <40 mg/dl (major risk factor for CHD)  High    >60 mg/dl ('negative' risk factor for CHD)  LDL Reference Ranges  (U.S. Department of Health and Human Services ATP III  Classifications)  Optimal          <100 mg/dL  Near Optimal     100-129 mg/dL  Borderline High  130-159 mg/dL  High             160-189 mg/dL  Very High        >189 mg/dL           Assessment / Plan         Assessment    Diagnoses and all orders for this visit:    1. Sciatica of right side (Primary)  He would like his lyrica increased and 90 day supplies. I have asked him to discuss this with his PCP at his upcoming follow up appointment next week given it is a controlled substance. He plans to do so.   Received a dose of IM ketorolac 60mg x1.   He also requested a steroid injection. We discussed risks of hyperglycemia with his diabetes. He is insistent. We did check his HA1c with improvement down to 9.1% from 15%. Discussed close monitoring of sugar/carb intake over the next few days and to contact the office with concerns. He expressed understanding and wishes to proceed. He received a dose of IM dexamethasone 4mg x1.   -     ketorolac (TORADOL) injection 60 mg  -     dexAMETHasone (DECADRON) injection 4 mg    2. Type 2 diabetes mellitus without complication, with long-term current use of insulin  Reports hypoglycemia intermittently on his current regimen with readings down to 50 at times.  We discussed that his glucose my rise with the steroid injection given today. Continue to monitor glucose and if recurrent low readings, decrease Xultophy by 2Units, monitor glucose closely, and keep upcoming follow up with PCP next week for further adjustment is needed. Keep Gvoke pens with you at all times as well as snacks/beverages.   Urine microalbumin obtained today.   -     POC Glycosylated Hemoglobin (Hb A1C)  -     MicroAlbumin, Urine, Random - Urine, Clean Catch    3. Need for influenza vaccination  Agreeable for influenza vaccine today.   -     Fluzone High-Dose 65+yrs ([id number])       New Medications Ordered This Visit   Medications    ketorolac (TORADOL) injection 60 mg    dexAMETHasone (DECADRON) injection 4 mg     Follow Up   Return for Follow up with PCP as scheduled on 10/28..    Patient was given instructions and counseling regarding his condition or for health maintenance advice. Please see specific information pulled into  the AVS if appropriate.       This document has been electronically signed by MICHAEL Anguiano   October 23, 2024 11:23 EDT    Dictated Utilizing Dragon Dictation: Part of this note may be an electronic transcription/translation of spoken language to printed text using the Dragon Dictation System.

## 2024-10-25 LAB — MICROALBUMIN UR-MCNC: 3.1 UG/ML

## 2024-10-28 DIAGNOSIS — G25.81 RLS (RESTLESS LEGS SYNDROME): ICD-10-CM

## 2024-10-28 RX ORDER — PREGABALIN 50 MG/1
CAPSULE ORAL
Qty: 60 CAPSULE | Refills: 0 | Status: SHIPPED | OUTPATIENT
Start: 2024-10-28 | End: 2024-10-29

## 2024-10-29 ENCOUNTER — OFFICE VISIT (OUTPATIENT)
Dept: FAMILY MEDICINE CLINIC | Facility: CLINIC | Age: 79
End: 2024-10-29
Payer: MEDICARE

## 2024-10-29 DIAGNOSIS — D50.0 IRON DEFICIENCY ANEMIA DUE TO CHRONIC BLOOD LOSS: ICD-10-CM

## 2024-10-29 DIAGNOSIS — J44.9 MIXED TYPE COPD (CHRONIC OBSTRUCTIVE PULMONARY DISEASE): ICD-10-CM

## 2024-10-29 DIAGNOSIS — E29.1 ANDROGEN DEFICIENCY: ICD-10-CM

## 2024-10-29 DIAGNOSIS — E11.9 TYPE 2 DIABETES MELLITUS WITHOUT COMPLICATION, WITH LONG-TERM CURRENT USE OF INSULIN: ICD-10-CM

## 2024-10-29 DIAGNOSIS — M54.59 MECHANICAL LOW BACK PAIN: ICD-10-CM

## 2024-10-29 DIAGNOSIS — Z00.00 HEALTHCARE MAINTENANCE: ICD-10-CM

## 2024-10-29 DIAGNOSIS — G89.29 CHRONIC PAIN OF LEFT KNEE: ICD-10-CM

## 2024-10-29 DIAGNOSIS — Z79.4 TYPE 2 DIABETES MELLITUS WITHOUT COMPLICATION, WITH LONG-TERM CURRENT USE OF INSULIN: ICD-10-CM

## 2024-10-29 DIAGNOSIS — K21.9 GASTROESOPHAGEAL REFLUX DISEASE WITHOUT ESOPHAGITIS: ICD-10-CM

## 2024-10-29 DIAGNOSIS — E55.9 VITAMIN D DEFICIENCY: ICD-10-CM

## 2024-10-29 DIAGNOSIS — I63.421 CEREBRAL INFARCTION DUE TO EMBOLISM OF RIGHT ANTERIOR CEREBRAL ARTERY: ICD-10-CM

## 2024-10-29 DIAGNOSIS — M25.562 CHRONIC PAIN OF LEFT KNEE: ICD-10-CM

## 2024-10-29 DIAGNOSIS — I70.0 ATHEROSCLEROSIS OF AORTA: ICD-10-CM

## 2024-10-29 DIAGNOSIS — L29.9 PRURITUS: ICD-10-CM

## 2024-10-29 DIAGNOSIS — I10 ESSENTIAL HYPERTENSION: ICD-10-CM

## 2024-10-29 DIAGNOSIS — G25.81 RLS (RESTLESS LEGS SYNDROME): ICD-10-CM

## 2024-10-29 DIAGNOSIS — I70.1 RENAL ARTERY STENOSIS: ICD-10-CM

## 2024-10-29 DIAGNOSIS — Z98.890 HISTORY OF RIGHT-SIDED CAROTID ENDARTERECTOMY: ICD-10-CM

## 2024-10-29 DIAGNOSIS — M85.89 OSTEOPENIA OF MULTIPLE SITES: ICD-10-CM

## 2024-10-29 DIAGNOSIS — I25.10 CORONARY ARTERY CALCIFICATION SEEN ON CT SCAN: ICD-10-CM

## 2024-10-29 DIAGNOSIS — F17.200 SMOKER: ICD-10-CM

## 2024-10-29 DIAGNOSIS — I63.81 MULTIPLE LACUNAR INFARCTS: ICD-10-CM

## 2024-10-29 DIAGNOSIS — J30.9 CHRONIC ALLERGIC RHINITIS: Primary | ICD-10-CM

## 2024-10-29 DIAGNOSIS — N40.0 BPH WITHOUT OBSTRUCTION/LOWER URINARY TRACT SYMPTOMS: ICD-10-CM

## 2024-10-29 DIAGNOSIS — F41.8 ANXIETY ASSOCIATED WITH DEPRESSION: ICD-10-CM

## 2024-10-29 DIAGNOSIS — Z86.0100 HISTORY OF COLONIC POLYPS: ICD-10-CM

## 2024-10-29 DIAGNOSIS — E78.2 MIXED HYPERLIPIDEMIA: ICD-10-CM

## 2024-10-29 DIAGNOSIS — Z85.828 HISTORY OF NONMELANOMA SKIN CANCER: ICD-10-CM

## 2024-10-29 DIAGNOSIS — E11.9 TYPE 2 DIABETES MELLITUS WITHOUT COMPLICATION, WITHOUT LONG-TERM CURRENT USE OF INSULIN: ICD-10-CM

## 2024-10-29 RX ORDER — (INSULIN DEGLUDEC AND LIRAGLUTIDE) 100; 3.6 [IU]/ML; MG/ML
10 INJECTION, SOLUTION SUBCUTANEOUS DAILY
Qty: 6 ML | Refills: 2 | Status: SHIPPED | OUTPATIENT
Start: 2024-10-29

## 2024-10-29 RX ORDER — PREGABALIN 100 MG/1
100 CAPSULE ORAL 4 TIMES DAILY
Qty: 120 CAPSULE | Refills: 2 | Status: SHIPPED | OUTPATIENT
Start: 2024-10-29

## 2024-10-29 NOTE — PROGRESS NOTES
"Subjective   Torin Granados is a 79 y.o. male.     Chief Complaint  He returns for a scheduled reassessment of multiple medical problems including type 2 diabetes mellitus, hyperlipidemia, essential hypertension, previous right frontal cerebral infarcts, restless leg syndrome, chronic left knee pain, benign prostatic hypertrophy, and iron deficiency anemia    History of Present Illness     Type 2 Diabetes Mellitus  He admits to numbness and tingling of the feet.  He has had intermittent hypoglycemia since last here.  He has been administering insulin degludec-liraglutide \"as needed\" with anywhere from 0 to 3 doses of 15 units daily.  He denies any visual disturbances, polydipsia, polyuria, or foot ulcerations.  He remains on empagliflozin as well  Lab Results   Component Value Date    HGBA1C 9.1 (A) 10/23/2024     Lab Results   Component Value Date    MICROALBUR 3.1 10/23/2024     Hyperlipidemia  He remains on atorvastatin and ezetimibe with no apparent side effects    Essential Hypertension  He continues to deny any chest pain, palpitations, dyspnea, orthopnea, paroxysmal nocturnal dyspnea or peripheral edema.  He remains on lisinopril and amlodipine.    Right Frontal Cerebral Infarcts  He was admitted to [facility] on 10/28/2022 after a sudden episode of generalized weakness associated with collapse and apparent involuntary movements of both arms and legs.  He did not lose consciousness and recalls the entire experience.  Noncontrast CT of the brain on admission was reported as showing moderate atrophy and a an old lacunar infarct of the anterior limb of the right internal capsule.  CTA of the brain and carotids was reported as showing a 95% stenosis of the proximal right ICA, a less than 50% stenosis of the proximal left ICA, and changes consistent with advanced centrilobular emphysema.  He underwent a right carotid endarterectomy on 11/3/2022.  MRI of the brain performed on 11/4/2022 revealed moderate atrophy, " chronic microvascular changes, and old right basal ganglia and periventricular lacunar infarcts, and small acute infarcts of the right frontal cortex and subcortical white matter.  Echocardiogram done while in hospital was reported as showing sclerotic aortic valve leaflets but no other significant normalities with an estimated EF of 65%.  Ultrasound of the renal arteries was reported as showing a more than 60% stenosis on the left and a nonsignificant stenosis on the right.  He remains on clopidogrel and low-dose ASA along with levetiracetam. Unfortunately he continues to smoke and dip tobacco. He continues to deny any unilateral weakness, numbness, or tingling and there is no history of any loss of vision in either eye or side nor any difficulty talking or understanding what is said to him.  Updated ultrasound of the carotid arteries performed on 5/22/2023 revealed mild plaque but no significant stenosis.  He is scheduled to undergo a stroke neurology reassessment with ISAIAS Bowden on 12/11/2024    Restless Leg Syndrome  He has a long history of intermittent lower extremity discomfort.  He describes this as a tight ache centered about his knees.  Episodes occur primarily at night.  He admits to occasional knee stiffness but denies any swelling.  He feels pregabalin works quite well but only at a dose of 100 4 times daily.      Left Knee Pain  He has a long history of intermittent left knee pain associated with intermittent posterior swelling. There is no history of any stiffness and he continues to deny any giving way or locking.  He gives a history of several implant procedures on his left knee 1 of which was apparently an ACL repair. He is right-hand dominant.  Plain films of the knee performed on 4/28/2023 were reported as showing hardware associated with a previous ACL repair along with joint space narrowing and chondrocalcinosis worse medially.  Ultrasound of the left knee performed on 6/22/2023 was  reported as showing a small joint effusion and posterior cystic fluid consistent with a popliteal cyst    BPH  He underwent a TURP greenlight by Dr. Barraza on 6/2/2022.  His course was uncomplicated and his bladder catheter removed 4 days afterward.  He complains of frequency, nocturia, and a sense of incomplete voiding with occasional dysuria.  He denies any hematuria.  He is no longer followed by urology    Iron Deficiency Anemia  He is on iron supplementation with no apparent side effects.  He underwent an updated colonoscopy on 8/17/2022 with removal of 7 tubular adenomatous polyps.  He was advised to have his next study in 3 years.  He underwent an EGD by Dr. Navarro on 8/15/2023 with evidence of iron pill gastritis    The following portions of the patient's history were reviewed and updated as appropriate: allergies, current medications, past medical history, past social history, and problem list.    Review of Systems   Constitutional:  Negative for chills, fatigue, fever and unexpected weight loss.   HENT:  Positive for rhinorrhea and sneezing. Negative for congestion, hearing loss, postnasal drip, sinus pressure, sore throat and voice change.    Eyes:  Negative for visual disturbance.   Respiratory:  Positive for shortness of breath. Negative for cough and wheezing.    Cardiovascular:  Negative for chest pain, palpitations and leg swelling.   Gastrointestinal:  Negative for abdominal pain, blood in stool, constipation, diarrhea, nausea, vomiting and GERD.   Endocrine: Negative for polydipsia and polyuria.   Genitourinary:  Positive for frequency and nocturia (1-2). Negative for dysuria, hematuria and urgency.   Musculoskeletal:  Positive for arthralgias and back pain. Negative for joint swelling, myalgias and neck pain.   Skin:  Negative for rash.   Neurological:  Positive for tremors, numbness (feet) and confusion (difficulty with concentration at times). Negative for speech difficulty and weakness.    Psychiatric/Behavioral:  Positive for decreased concentration. Negative for sleep disturbance and depressed mood. The patient is nervous/anxious.      Objective   Physical Exam  Constitutional:       General: He is not in acute distress.     Appearance: Normal appearance. He is well-developed. He is not ill-appearing or diaphoretic.      Comments: Looked tired, but alert and oriented. No apparent distress. No pallor, jaundice, diaphoresis, or cyanosis.   HENT:      Head: Atraumatic.      Right Ear: Tympanic membrane, ear canal and external ear normal.      Left Ear: Tympanic membrane, ear canal and external ear normal.      Mouth/Throat:      Lips: No lesions.      Mouth: Mucous membranes are moist. No oral lesions.      Pharynx: No oropharyngeal exudate or posterior oropharyngeal erythema.   Eyes:      General: Lids are normal. Gaze aligned appropriately.      Extraocular Movements: Extraocular movements intact.      Conjunctiva/sclera: Conjunctivae normal.      Pupils: Pupils are equal.   Neck:      Thyroid: No thyroid mass or thyromegaly.      Vascular: No carotid bruit or JVD.      Trachea: Trachea normal. No tracheal deviation.   Cardiovascular:      Rate and Rhythm: Normal rate and regular rhythm.      Heart sounds: Normal heart sounds, S1 normal and S2 normal. No murmur heard.     No gallop. No S3 or S4 sounds.   Pulmonary:      Effort: Pulmonary effort is normal.      Breath sounds: Wheezing (diffuse - mild) present.      Comments: Pulmonary hyperinflation  Abdominal:      General: Bowel sounds are normal. There is no distension.   Musculoskeletal:      Right lower leg: No edema.      Left lower leg: No edema.      Comments: No peripheral joint redness or warmth.   Lymphadenopathy:      Head:      Right side of head: No submental, submandibular, tonsillar, preauricular, posterior auricular or occipital adenopathy.      Left side of head: No submental, submandibular, tonsillar, preauricular, posterior  auricular or occipital adenopathy.      Cervical: No cervical adenopathy.      Upper Body:      Right upper body: No supraclavicular adenopathy.      Left upper body: No supraclavicular adenopathy.   Skin:     General: Skin is warm and dry.      Coloration: Skin is not cyanotic, jaundiced or pale.      Findings: No lesion or rash.      Nails: There is no clubbing.   Neurological:      Mental Status: He is alert and oriented to person, place, and time.      Cranial Nerves: No cranial nerve deficit, dysarthria or facial asymmetry.      Sensory: Sensory deficit (decreased vibration sense toes of both feet) present.      Motor: No tremor.      Coordination: Coordination normal.      Gait: Gait normal.   Psychiatric:         Attention and Perception: Attention normal.         Mood and Affect: Mood normal.         Speech: Speech normal.         Behavior: Behavior normal.         Thought Content: Thought content normal.       Assessment & Plan   Problems Addressed this Visit          Allergies and Adverse Reactions    Chronic allergic rhinitis       Cardiac and Vasculature    Atherosclerosis of aorta  Reminded regarding risk factor modification with an emphasis on tobacco cessation.    Coronary artery calcification seen on CT scan  As above.    Essential hypertension  Encouraged to continue to work on his diet and exercise plan.  Continue current medication    History of right-sided carotid endarterectomy  As above.  Continue dual antiplatelet therapy.  Follow up with neurology     Mixed hyperlipidemia  As above.   Continue current medication.    Renal artery stenosis  As above.       Endocrine and Metabolic    Androgen deficiency    Type 2 diabetes mellitus without complication, with long-term current use of insulin  Diabetes mellitus Type II, under better control.   Encouraged to continue to pursue ADA diet  Encouraged aerobic exercise.  Lengthy discussion regarding appropriate use of insulin degludec-liraglutide.   "Advised that this is an ultra long-acting insulin and cannot be used \"as needed\"  Agreed on a dose of 10 units daily with a return in 2 weeks for reassessment and a review of his glucose control  Encouraged to report if any questions or concerns in the meantime    Relevant Medications    Insulin Degludec-Liraglutide (Xultophy) 100-3.6 UNIT-MG/ML solution pen-injector subcutaneous pen    Vitamin D deficiency       Gastrointestinal Abdominal     Gastroesophageal reflux disease without esophagitis    History of colonic polyps  He will be due for an updated colonoscopy in summer 2025       Genitourinary and Reproductive     BPH without obstruction/lower urinary tract symptoms  Continue current medication  Encouraged to report if any worse or if any new symptoms or concerns.       Health Encounters    Healthcare maintenance  Patient has already received a flu shot fall.  Recommended an updated COVID-19 shot this fall       Hematology and Neoplasia    History of nonmelanoma skin cancer    Iron deficiency anemia due to chronic blood loss  Will continue to monitor       Mental Health    Anxiety associated with depression       Musculoskeletal and Injuries    Chronic pain of left knee  Reminded regarding symptomatic treatment.   Encouraged to report if any worse or if any new symptoms or concerns.    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   Continue current medication.  Follow up with neurology     Multiple lacunar infarcts  As above.    RLS (restless legs syndrome)  Pregabalin will be continued at a dose of 100 4 times daily for the time being.  Patient is aware of the potential side effects including the risk of falls    Relevant Medications    pregabalin (Lyrica) 100 MG capsule       Pulmonary and Pneumonias    Mixed type COPD (chronic obstructive pulmonary disease)   COPD is stable.  Reminded of the " importance of smoking cessation  Encouraged to remain as active as symptoms allow for       Skin    Pruritus       Tobacco    Smoker     Diagnoses         Codes Comments    Chronic allergic rhinitis    -  Primary ICD-10-CM: J30.9  ICD-9-CM: 477.9     Renal artery stenosis     ICD-10-CM: I70.1  ICD-9-CM: 440.1     Mixed hyperlipidemia     ICD-10-CM: E78.2  ICD-9-CM: 272.2     History of right-sided carotid endarterectomy     ICD-10-CM: Z98.890  ICD-9-CM: V45.89     Essential hypertension     ICD-10-CM: I10  ICD-9-CM: 401.9     Coronary artery calcification seen on CT scan     ICD-10-CM: I25.10  ICD-9-CM: 414.00     Atherosclerosis of aorta     ICD-10-CM: I70.0  ICD-9-CM: 440.0     Vitamin D deficiency     ICD-10-CM: E55.9  ICD-9-CM: 268.9     Type 2 diabetes mellitus without complication, with long-term current use of insulin     ICD-10-CM: E11.9, Z79.4  ICD-9-CM: 250.00, V58.67     Androgen deficiency     ICD-10-CM: E29.1  ICD-9-CM: 257.2     History of colonic polyps     ICD-10-CM: Z86.0100  ICD-9-CM: V12.72     Gastroesophageal reflux disease without esophagitis     ICD-10-CM: K21.9  ICD-9-CM: 530.81     BPH without obstruction/lower urinary tract symptoms     ICD-10-CM: N40.0  ICD-9-CM: 600.00     Healthcare maintenance     ICD-10-CM: Z00.00  ICD-9-CM: V70.0     Iron deficiency anemia due to chronic blood loss     ICD-10-CM: D50.0  ICD-9-CM: 280.0     History of nonmelanoma skin cancer     ICD-10-CM: Z85.828  ICD-9-CM: V10.83     Anxiety associated with depression     ICD-10-CM: F41.8  ICD-9-CM: 300.4     Osteopenia of multiple sites     ICD-10-CM: M85.89  ICD-9-CM: 733.90     Mechanical low back pain     ICD-10-CM: M54.59  ICD-9-CM: 724.2     Chronic pain of left knee     ICD-10-CM: M25.562, G89.29  ICD-9-CM: 719.46, 338.29     RLS (restless legs syndrome)     ICD-10-CM: G25.81  ICD-9-CM: 333.94     Multiple lacunar infarcts     ICD-10-CM: I63.81  ICD-9-CM: 434.91     Cerebral infarction due to embolism of right  anterior cerebral artery     ICD-10-CM: I63.421  ICD-9-CM: 434.11     Mixed type COPD (chronic obstructive pulmonary disease)     ICD-10-CM: J44.9  ICD-9-CM: 496     Pruritus     ICD-10-CM: L29.9  ICD-9-CM: 698.9     Smoker     ICD-10-CM: F17.200  ICD-9-CM: 305.1     Type 2 diabetes mellitus without complication, without long-term current use of insulin     ICD-10-CM: E11.9  ICD-9-CM: 250.00

## 2024-10-30 VITALS
DIASTOLIC BLOOD PRESSURE: 68 MMHG | WEIGHT: 131 LBS | RESPIRATION RATE: 15 BRPM | SYSTOLIC BLOOD PRESSURE: 126 MMHG | HEART RATE: 91 BPM | OXYGEN SATURATION: 98 % | TEMPERATURE: 98.6 F | HEIGHT: 67 IN | BODY MASS INDEX: 20.56 KG/M2

## 2024-11-11 ENCOUNTER — HOSPITAL ENCOUNTER (EMERGENCY)
Facility: HOSPITAL | Age: 79
Discharge: HOME | End: 2024-11-12
Attending: STUDENT IN AN ORGANIZED HEALTH CARE EDUCATION/TRAINING PROGRAM | Admitting: STUDENT IN AN ORGANIZED HEALTH CARE EDUCATION/TRAINING PROGRAM
Payer: MEDICARE

## 2024-11-11 VITALS
BODY MASS INDEX: 19.62 KG/M2 | OXYGEN SATURATION: 98 % | WEIGHT: 125 LBS | DIASTOLIC BLOOD PRESSURE: 48 MMHG | HEART RATE: 84 BPM | SYSTOLIC BLOOD PRESSURE: 136 MMHG | RESPIRATION RATE: 16 BRPM | TEMPERATURE: 97.9 F | HEIGHT: 67 IN

## 2024-11-11 DIAGNOSIS — R04.0 NOSEBLEED: Primary | ICD-10-CM

## 2024-11-11 PROCEDURE — 99283 EMERGENCY DEPT VISIT LOW MDM: CPT

## 2024-11-11 RX ORDER — OXYMETAZOLINE HYDROCHLORIDE 0.05 G/100ML
1 SPRAY NASAL ONCE
Status: COMPLETED | OUTPATIENT
Start: 2024-11-11 | End: 2024-11-11

## 2024-11-11 RX ADMIN — Medication 1 SPRAY: at 23:44

## 2024-11-12 NOTE — ED PROVIDER NOTES
"Subjective   History of Present Illness  79-year-old male, on Eliquis, presents emergency department for nosebleed.  Patient was unable to control bleeding at home therefore came to the emergency department.        Review of Systems   All other systems reviewed and are negative.      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    Colon polyp     COPD (chronic obstructive pulmonary disease)     Coronary artery calcification seen on CT scan 06/08/2017    Deep vein thrombosis 2022    Depression     Diabetes mellitus     Enlarged prostate     Hyperlipidemia     Hypertension     Iron deficiency anemia due to chronic blood loss 06/20/2023    Seizures 2020    Stroke 2020    Substance abuse recovering    Ulcer        No Known Allergies    Past Surgical History:   Procedure Laterality Date    BRAIN SURGERY  less than year    COLONOSCOPY      over 5years ago    COLONOSCOPY N/A 03/29/2017    Procedure: Colonoscopy  CPTCODE: 24149;  Surgeon: Rudy Velez III, MD;  Location: [facility];  Service:     COLONOSCOPY N/A 08/17/2022    Procedure: COLONOSCOPY with polypectomy x 8;  Surgeon: Abad Agudelo MD;  Location: [facility] ENDOSCOPY;  Service: Gastroenterology;  Laterality: N/A;    CYSTOSCOPY TRANSURETHRAL RESECTION OF PROSTATE N/A 06/02/2022    Procedure: CYSTOSCOPY TRANSURETHRAL RESECTION OF PROSTATE GREENLIGHT;  Surgeon: Greg Barraza MD;  Location: [facility] OR;  Service: Urology;  Laterality: N/A;    ENDOSCOPY N/A 8/15/2023    Procedure: ESOPHAGOGASTRODUODENOSCOPY;  Surgeon: Primo Navarro MD;  Location: [facility] ENDOSCOPY;  Service: Gastroenterology;  Laterality: N/A;    EYE SURGERY  long time ago    FRACTURE SURGERY      KNEE SURGERY Left     \"3-4 surgeries on left knee\" per pt    " SKIN BIOPSY      melanoma on face    VASECTOMY  1970       Family History   Problem Relation Age of Onset    Diabetes Mother     Heart disease Mother     Stroke Mother     Hypertension Mother     Diabetes Father     Heart disease Father        Social History     Socioeconomic History    Marital status:      Spouse name: emmie    Number of children: 1    Years of education: 20   Tobacco Use    Smoking status: Some Days     Current packs/day: 1.00     Average packs/day: 1 pack/day for 15.6 years (15.6 ttl pk-yrs)     Types: Cigarettes     Start date: 1/1/2019     Passive exposure: Current    Smokeless tobacco: Current     Types: Snuff   Vaping Use    Vaping status: Never Used   Substance and Sexual Activity    Alcohol use: Not Currently     Comment: recoveringn alcoholic, no drink in 25 yrs    Drug use: No    Sexual activity: Defer           Objective   Physical Exam  Constitutional:       Appearance: Normal appearance.   HENT:      Head: Normocephalic.      Nose:      Comments: Bleeding from bilateral naris, appears to be anterior     Mouth/Throat:      Mouth: Mucous membranes are moist.   Eyes:      Pupils: Pupils are equal, round, and reactive to light.   Cardiovascular:      Rate and Rhythm: Normal rate and regular rhythm.   Pulmonary:      Effort: Pulmonary effort is normal.   Abdominal:      Palpations: Abdomen is soft.   Musculoskeletal:         General: Normal range of motion.      Cervical back: Normal range of motion.   Neurological:      General: No focal deficit present.      Mental Status: He is alert.   Psychiatric:         Mood and Affect: Mood normal.         Procedures           ED Course                    No data recorded                             Medical Decision Making  79-year-old male, on Eliquis, presents emergency department for nosebleed.  Patient was unable to control bleeding at home therefore came to the emergency department.  Bleeding was controlled with Surgicel and Afrin.   Patient advised to return to the emergency department for any worsening symptoms  Electronically signed by Last Clark DO, 11/12/24, 12:49 AM EST.      Problems Addressed:  Nosebleed: acute illness or injury    Risk  OTC drugs.        Final diagnoses:   Nosebleed       ED Disposition  ED Disposition       ED Disposition   Discharge    Condition   Stable    Comment   --               Jan Dudley MD  [address] [address]  [phone]      As needed         Medication List      No changes were made to your prescriptions during this visit.            Last Clark DO  11/12/24 0049

## 2024-11-14 ENCOUNTER — TELEPHONE (OUTPATIENT)
Dept: FAMILY MEDICINE CLINIC | Facility: CLINIC | Age: 79
End: 2024-11-14

## 2024-11-14 NOTE — TELEPHONE ENCOUNTER
Caller: Rik Granados    Relationship: Emergency Contact    Best call back number: [phone]     What is the best time to reach you: ANYTIME    Who are you requesting to speak with (clinical staff, provider,  specific staff member): DR. BROWN    What was the call regarding: SHE WOULD URGENTLY LIKE TO SPEAK WITH DR. BROWN ABOUT HELP GETTING LEGAL GUARDIANSHIP  OF THE PATIENT. SHE ALSO STATES THAT HE IS CURRENTLY IN THE HOSPITAL AND THEY ARE TALKING ABOUT DISCHARGING HIM. SHE FEELS HE IS NOT READY TO BE DISCHARGED. THE ENVIRONMENT HE WOULD BE DISCHARGED TO IS EXTREMELY UNHEALTHY AND SHE WOULD LIKE TO DISCUSS THAT TOO.

## 2024-11-15 ENCOUNTER — READMISSION MANAGEMENT (OUTPATIENT)
Dept: CALL CENTER | Facility: HOSPITAL | Age: 79
End: 2024-11-15
Payer: MEDICARE

## 2024-11-15 DIAGNOSIS — E11.9 TYPE 2 DIABETES MELLITUS WITHOUT COMPLICATION, WITHOUT LONG-TERM CURRENT USE OF INSULIN: ICD-10-CM

## 2024-11-15 RX ORDER — ROPINIROLE 2 MG/1
TABLET, FILM COATED ORAL
Qty: 90 TABLET | Refills: 2 | OUTPATIENT
Start: 2024-11-15

## 2024-11-15 NOTE — TELEPHONE ENCOUNTER
Rik said that Torin signed out of the hospital AMA and that he is not mentally able to make decision for himself. She is very concerned about him. She wanted to know if there was anyway she could talk to you about his care.

## 2024-11-15 NOTE — TELEPHONE ENCOUNTER
Unable to reach the patient. Could not leave voicemail. Will try again later.     Hub to read/relay!

## 2024-11-15 NOTE — TELEPHONE ENCOUNTER
Is he at home or still in hospital?  If at home, we will need a list of his discharge meds from [facility]

## 2024-11-16 NOTE — OUTREACH NOTE
Prep Survey      Flowsheet Row Responses   [facility] facility patient discharged from? Non-BH   Is LACE score < 7 ? Non-BH Discharge   Eligibility T[facility]   Date of Admission 11/12/24   Date of Discharge 11/15/24   Discharge Disposition Home or Self Care   Discharge diagnosis Weakness (Primary Dx   Does the patient have one of the following disease processes/diagnoses(primary or secondary)? Other   Does the patient have Home health ordered? No   Is there a DME ordered? No   Prep survey completed? Yes            [address] - Registered Nurse

## 2024-11-17 ENCOUNTER — TRANSITIONAL CARE MANAGEMENT TELEPHONE ENCOUNTER (OUTPATIENT)
Dept: CALL CENTER | Facility: HOSPITAL | Age: 79
End: 2024-11-17
Payer: MEDICARE

## 2024-11-17 NOTE — OUTREACH NOTE
Call Center TCM Note      Flowsheet Row Responses   [facility] patient discharged from? Non-   Does the patient have one of the following disease processes/diagnoses(primary or secondary)? Other   TCM attempt successful? Yes   Call start time 1014   Call end time 1020   Discharge diagnosis Weakness (Primary Dx   Person spoke with today (if not patient) and relationship Cappie/spouse   Meds reviewed with patient/caregiver? Yes   Is the patient having any side effects they believe may be caused by any medication additions or changes? No   Does the patient have all medications ordered at discharge? Yes   Is the patient taking all medications as directed (includes completed medication regime)? Yes   Comments Hospital f/u appt. with PCP 11/18/24 @ 4:30pm.   Does the patient have an appointment with their PCP within 7-14 days of discharge? Yes   Has home health visited the patient within 72 hours of discharge? N/A   Psychosocial issues? Yes  [Spouse reports pt. has dementia. Hx. anxiety,depression, alcoholism. Medication: cymbalta.]   Did the patient receive a copy of their discharge instructions? --  [Spouse reports pt. left before being discharged.]   Is the patient/caregiver able to teach back signs and symptoms related to disease process for when to call PCP? Yes   Is the patient/caregiver able to teach back signs and symptoms related to disease process for when to call 911? Yes   Is the patient/caregiver able to teach back the hierarchy of who to call/visit for symptoms/problems? PCP, Specialist, Home health nurse, Urgent Care, ED, 911 Yes   TCM call completed? Yes   Wrap up additional comments Spouse reports pt. left hospital before d/c. She reports has obainted an elder law  to assist her with care for pt. Offered referral for Amb , spouse declined. She reports case mgt/ consulted during hospitalization were not able to help, pt. deemed capable of decision making. Spouse  will attempt to obtain neuropsych testing for patient.   Call end time 1020   Would this patient benefit from a Referral to [facility] Social Work? No   Is the patient interested in additional calls from an ambulatory ? No            Traci Sims RN    11/17/2024, 10:30 EST

## 2024-11-18 ENCOUNTER — OFFICE VISIT (OUTPATIENT)
Dept: FAMILY MEDICINE CLINIC | Facility: CLINIC | Age: 79
End: 2024-11-18
Payer: MEDICARE

## 2024-11-18 VITALS
HEART RATE: 81 BPM | BODY MASS INDEX: 21.5 KG/M2 | WEIGHT: 137 LBS | SYSTOLIC BLOOD PRESSURE: 112 MMHG | TEMPERATURE: 97.8 F | OXYGEN SATURATION: 96 % | RESPIRATION RATE: 15 BRPM | HEIGHT: 67 IN | DIASTOLIC BLOOD PRESSURE: 62 MMHG

## 2024-11-18 DIAGNOSIS — I70.0 ATHEROSCLEROSIS OF AORTA: ICD-10-CM

## 2024-11-18 DIAGNOSIS — R41.0 CONFUSION: ICD-10-CM

## 2024-11-18 DIAGNOSIS — I63.421 CEREBRAL INFARCTION DUE TO EMBOLISM OF RIGHT ANTERIOR CEREBRAL ARTERY: ICD-10-CM

## 2024-11-18 DIAGNOSIS — Z86.0100 HISTORY OF COLONIC POLYPS: ICD-10-CM

## 2024-11-18 DIAGNOSIS — E78.2 MIXED HYPERLIPIDEMIA: ICD-10-CM

## 2024-11-18 DIAGNOSIS — Z79.4 TYPE 2 DIABETES MELLITUS WITH HYPERGLYCEMIA, WITH LONG-TERM CURRENT USE OF INSULIN: ICD-10-CM

## 2024-11-18 DIAGNOSIS — D50.0 IRON DEFICIENCY ANEMIA DUE TO CHRONIC BLOOD LOSS: ICD-10-CM

## 2024-11-18 DIAGNOSIS — F41.8 ANXIETY ASSOCIATED WITH DEPRESSION: ICD-10-CM

## 2024-11-18 DIAGNOSIS — E11.9 TYPE 2 DIABETES MELLITUS WITHOUT COMPLICATION, WITHOUT LONG-TERM CURRENT USE OF INSULIN: ICD-10-CM

## 2024-11-18 DIAGNOSIS — J44.9 MIXED TYPE COPD (CHRONIC OBSTRUCTIVE PULMONARY DISEASE): ICD-10-CM

## 2024-11-18 DIAGNOSIS — G25.81 RLS (RESTLESS LEGS SYNDROME): ICD-10-CM

## 2024-11-18 DIAGNOSIS — N40.0 BPH WITHOUT OBSTRUCTION/LOWER URINARY TRACT SYMPTOMS: ICD-10-CM

## 2024-11-18 DIAGNOSIS — E11.65 TYPE 2 DIABETES MELLITUS WITH HYPERGLYCEMIA, WITH LONG-TERM CURRENT USE OF INSULIN: ICD-10-CM

## 2024-11-18 DIAGNOSIS — R04.0 EPISTAXIS: ICD-10-CM

## 2024-11-18 DIAGNOSIS — Z00.00 HEALTHCARE MAINTENANCE: ICD-10-CM

## 2024-11-18 DIAGNOSIS — K21.9 GASTROESOPHAGEAL REFLUX DISEASE WITHOUT ESOPHAGITIS: ICD-10-CM

## 2024-11-18 DIAGNOSIS — I10 ESSENTIAL HYPERTENSION: ICD-10-CM

## 2024-11-18 DIAGNOSIS — I63.81 MULTIPLE LACUNAR INFARCTS: ICD-10-CM

## 2024-11-18 DIAGNOSIS — I70.1 RENAL ARTERY STENOSIS: Primary | ICD-10-CM

## 2024-11-18 DIAGNOSIS — I25.10 CORONARY ARTERY CALCIFICATION SEEN ON CT SCAN: ICD-10-CM

## 2024-11-18 DIAGNOSIS — J18.9 PNEUMONIA OF BOTH LOWER LOBES DUE TO INFECTIOUS ORGANISM: ICD-10-CM

## 2024-11-18 DIAGNOSIS — M54.17 LUMBOSACRAL RADICULOPATHY AT S1: ICD-10-CM

## 2024-11-18 DIAGNOSIS — Z98.890 HISTORY OF RIGHT-SIDED CAROTID ENDARTERECTOMY: ICD-10-CM

## 2024-11-18 DIAGNOSIS — F17.200 SMOKER: ICD-10-CM

## 2024-11-18 DIAGNOSIS — L29.9 PRURITUS: ICD-10-CM

## 2024-11-18 PROCEDURE — 1125F AMNT PAIN NOTED PAIN PRSNT: CPT | Performed by: GENERAL PRACTICE

## 2024-11-18 PROCEDURE — 99215 OFFICE O/P EST HI 40 MIN: CPT | Performed by: GENERAL PRACTICE

## 2024-11-18 PROCEDURE — 3074F SYST BP LT 130 MM HG: CPT | Performed by: GENERAL PRACTICE

## 2024-11-18 PROCEDURE — 3078F DIAST BP <80 MM HG: CPT | Performed by: GENERAL PRACTICE

## 2024-11-18 PROCEDURE — G2211 COMPLEX E/M VISIT ADD ON: HCPCS | Performed by: GENERAL PRACTICE

## 2024-11-18 PROCEDURE — G2212 PROLONG OUTPT/OFFICE VIS: HCPCS | Performed by: GENERAL PRACTICE

## 2024-11-18 RX ORDER — ROPINIROLE 2 MG/1
2 TABLET, FILM COATED ORAL 2 TIMES DAILY
Qty: 60 TABLET | Refills: 2 | Status: SHIPPED | OUTPATIENT
Start: 2024-11-18

## 2024-11-18 NOTE — PROGRESS NOTES
Subjective   Torin Granados is a 79 y.o. male.     Chief Complaint  Hospital follow-up    History of Present Illness     Hospital Follow-Up  He left AMA from [facility] on 11/15/2024 following a 3 day stay for persistent nosebleeds, severe anemia, possible pneumonia, and intermittent confusion.  He was transfused 2 units of packed red blood cells while in hospital.  Hemoglobin on discharge was 7.9.  He was asked to stop ASA and continue clopidogrel alone.  He was prescribed a 1 week course of cefdinir but has yet to fill this.  He denies any further nosebleeds.  He admits to mild nasal congestion but denies any sore throat or earache.  He admits to a cough and shortness of breath but denies any chest pain, hemoptysis, fever, or chills.  He continues to smoke.  He adamantly denies any confusion.  He is aware that his wife is concerned about his living conditions and what he is spending his money on, but states these are choices that are his to make.  He states that apart from some of his medical problems, he is quite happy with his life    Type 2 Diabetes Mellitus  He admits to numbness and tingling of the feet.  He denies any further hypoglycemia.  He has been administering 10 units of insulin degludec-liraglutide daily as directed.  He denies any visual disturbances, polydipsia, polyuria, or foot ulcerations.  He remains on empagliflozin as well.  Hemoglobin A1c drawn in hospital returned at 7.8    Hyperlipidemia  He remains on atorvastatin and ezetimibe with no apparent side effects    Essential Hypertension  He he admits to shortness of breath.  He continues to deny any chest pain, palpitations, orthopnea, paroxysmal nocturnal dyspnea or peripheral edema.  He remains on lisinopril and amlodipine.    Right Frontal Cerebral Infarcts  He was admitted to [facility] on 10/28/2022 after a sudden episode of generalized weakness associated with collapse and apparent involuntary movements of both arms and legs.  He did not  lose consciousness and recalls the entire experience.  Noncontrast CT of the brain on admission was reported as showing moderate atrophy and a an old lacunar infarct of the anterior limb of the right internal capsule.  CTA of the brain and carotids was reported as showing a 95% stenosis of the proximal right ICA, a less than 50% stenosis of the proximal left ICA, and changes consistent with advanced centrilobular emphysema.  He underwent a right carotid endarterectomy on 11/3/2022.  MRI of the brain performed on 11/4/2022 revealed moderate atrophy, chronic microvascular changes, and old right basal ganglia and periventricular lacunar infarcts, and small acute infarcts of the right frontal cortex and subcortical white matter.  Echocardiogram done while in hospital was reported as showing sclerotic aortic valve leaflets but no other significant normalities with an estimated EF of 65%.  Ultrasound of the renal arteries was reported as showing a more than 60% stenosis on the left and a nonsignificant stenosis on the right.  He remains on clopidogrel and low-dose ASA along with levetiracetam. Unfortunately he continues to smoke and dip tobacco. He continues to deny any unilateral weakness, numbness, or tingling and there is no history of any loss of vision in either eye or side nor any difficulty talking or understanding what is said to him.  Updated ultrasound of the carotid arteries performed on 5/22/2023 revealed mild plaque but no significant stenosis.  CTA of the head and neck performed during his recent hospitalization was reported as showing mild plaque of the left ICA with an approximate 50% stenosis.  He is scheduled to undergo a stroke neurology reassessment with ISAIAS Bowden on 12/11/2024    Restless Leg Syndrome  He has a long history of intermittent lower extremity discomfort.  He describes this as a tight ache centered about his knees.  Episodes occur primarily at night.  He admits to occasional knee  stiffness but denies any swelling.  He remains on ropinirole 2 3 times daily and pregabalin 100 4 times daily but feels that he needs a higher dose of each.      Left Knee Pain  He has a long history of intermittent left knee pain associated with intermittent posterior swelling. There is no history of any stiffness and he continues to deny any giving way or locking.  He gives a history of several implant procedures on his left knee 1 of which was apparently an ACL repair. He is right-hand dominant.  Plain films of the knee performed on 4/28/2023 were reported as showing hardware associated with a previous ACL repair along with joint space narrowing and chondrocalcinosis worse medially.  Ultrasound of the left knee performed on 6/22/2023 was reported as showing a small joint effusion and posterior cystic fluid consistent with a popliteal cyst    BPH  He underwent a TURP greenlight by Dr. Barraza on 6/2/2022.  His course was uncomplicated and his bladder catheter removed 4 days afterward.  He complains of frequency, nocturia, and a sense of incomplete voiding with occasional dysuria.  He denies any hematuria.  He is no longer followed by urology    Iron Deficiency Anemia  He underwent an updated colonoscopy on 8/17/2022 with removal of 7 tubular adenomatous polyps.  He was advised to have his next study in 3 years.  He underwent an EGD by Dr. Navarro on 8/15/2023 with evidence of iron pill gastritis. He remains on a fairly high OTC dose of iron and his level on admission to hospital was 160 with a ferritin of 457    The following portions of the patient's history were reviewed and updated as appropriate: allergies, current medications, past medical history, past social history, and problem list.    Review of Systems   Constitutional:  Negative for chills, fatigue, fever and unexpected weight loss.   HENT:  Positive for congestion, rhinorrhea and sneezing. Negative for hearing loss, postnasal drip, sinus pressure, sore  throat and voice change.    Eyes:  Negative for visual disturbance.   Respiratory:  Positive for cough and shortness of breath. Negative for wheezing.    Cardiovascular:  Negative for chest pain, palpitations and leg swelling.   Gastrointestinal:  Negative for abdominal pain, blood in stool, constipation, diarrhea, nausea, vomiting and GERD.   Endocrine: Negative for polydipsia and polyuria.   Genitourinary:  Positive for frequency and nocturia (1-2). Negative for dysuria, hematuria and urgency.   Musculoskeletal:  Positive for arthralgias and back pain. Negative for joint swelling, myalgias and neck pain.   Skin:  Negative for rash.   Neurological:  Positive for tremors and numbness (feet). Negative for speech difficulty, weakness and confusion.   Psychiatric/Behavioral:  Positive for decreased concentration. Negative for sleep disturbance and depressed mood. The patient is nervous/anxious.      Objective   Physical Exam  Constitutional:       General: He is not in acute distress.     Appearance: Normal appearance. He is well-developed. He is not ill-appearing or diaphoretic.      Comments: Brighter today. Alert and oriented.  Calm, cooperative, and in no apparent distress. No pallor, jaundice, diaphoresis, or cyanosis.   HENT:      Head: Atraumatic.      Right Ear: Tympanic membrane, ear canal and external ear normal.      Left Ear: Tympanic membrane, ear canal and external ear normal.      Mouth/Throat:      Lips: No lesions.      Mouth: Mucous membranes are moist. No oral lesions.      Pharynx: No oropharyngeal exudate or posterior oropharyngeal erythema.   Eyes:      General: Lids are normal. Gaze aligned appropriately.      Extraocular Movements: Extraocular movements intact.      Conjunctiva/sclera: Conjunctivae normal.      Pupils: Pupils are equal.   Neck:      Thyroid: No thyroid mass or thyromegaly.      Vascular: No carotid bruit or JVD.      Trachea: Trachea normal. No tracheal deviation.    Cardiovascular:      Rate and Rhythm: Normal rate and regular rhythm.      Heart sounds: Normal heart sounds, S1 normal and S2 normal. No murmur heard.     No gallop. No S3 or S4 sounds.   Pulmonary:      Effort: Pulmonary effort is normal.      Breath sounds: Wheezing (diffuse - mild) present.      Comments: Pulmonary hyperinflation  Abdominal:      General: Bowel sounds are normal. There is no distension or abdominal bruit.      Palpations: Abdomen is soft. There is no hepatomegaly, splenomegaly or mass.      Tenderness: There is no abdominal tenderness.      Hernia: No hernia is present.   Musculoskeletal:      Right lower leg: No edema.      Left lower leg: No edema.      Comments: No peripheral joint redness or warmth.   Lymphadenopathy:      Head:      Right side of head: No submental, submandibular, tonsillar, preauricular, posterior auricular or occipital adenopathy.      Left side of head: No submental, submandibular, tonsillar, preauricular, posterior auricular or occipital adenopathy.      Cervical: No cervical adenopathy.      Upper Body:      Right upper body: No supraclavicular adenopathy.      Left upper body: No supraclavicular adenopathy.   Skin:     General: Skin is warm and dry.      Coloration: Skin is not cyanotic, jaundiced or pale.      Findings: Bruising (about both arms and left lateral chest - patient contributes this to the work he is doing but admits to a fall prior to his hospitalization) present. No lesion or rash.      Nails: There is no clubbing.   Neurological:      Mental Status: He is alert and oriented to person, place, and time.      Cranial Nerves: No cranial nerve deficit, dysarthria or facial asymmetry.      Sensory: Sensory deficit (decreased vibration sense toes of both feet) present.      Motor: No tremor.      Coordination: Coordination normal.      Gait: Gait normal.   Psychiatric:         Attention and Perception: Attention normal.         Mood and Affect: Mood normal.          Speech: Speech normal.         Behavior: Behavior normal.         Thought Content: Thought content normal.       Assessment & Plan   Problems Addressed this Visit          Cardiac and Vasculature    Atherosclerosis of aorta  Reminded regarding risk factor modification with an emphasis on tobacco cessation.  Continue clopidogrel.    Relevant Orders    CBC & Differential    Coronary artery calcification seen on CT scan  As above.    Relevant Orders    CBC & Differential    Essential hypertension   Hypertension:  BP a bit low today .   Encouraged to continue to work on diet and exercise plan.   Continue current medication for now  Amlodipine will be discontinued if his blood pressure remains low at his return    Relevant Orders    CBC & Differential    Comprehensive Metabolic Panel    History of right-sided carotid endarterectomy  As above.  Follow up with neurology     Relevant Orders    CBC & Differential    Mixed hyperlipidemia  As above.   Continue current medication.    Relevant Orders    Comprehensive Metabolic Panel    Lipid Panel    Renal artery stenosis     Relevant Orders    CBC & Differential       ENT    Epistaxis  Resolved  Encouraged to report if this should change.       Endocrine and Metabolic    Type 2 diabetes mellitus with hyperglycemia, with long-term current use of insulin    Diabetes mellitus Type II, under much better control.   Encouraged to continue to pursue ADA diet  Encouraged aerobic exercise.  Continue current medication        Other Relevant Orders    Comprehensive Metabolic Panel       Gastrointestinal Abdominal     Gastroesophageal reflux disease without esophagitis    Relevant Orders    CBC & Differential    History of colonic polyps    Relevant Orders    CBC & Differential       Genitourinary and Reproductive     BPH without obstruction/lower urinary tract symptoms       Health Encounters    Healthcare maintenance  Patient has already received a flu and COVID-19 shot this  fall  We will discuss an updated low-dose CT of the chest at his return       Hematology and Neoplasia    Iron deficiency anemia due to chronic blood loss  Advised regarding an appropriate iron intake  Patient will return in the next few days for updated labs    Relevant Orders    CBC & Differential    Iron    Transferrin    Ferritin    Reticulocytes    Sedimentation Rate    C-reactive Protein       Mental Health    Anxiety associated with depression    Relevant Orders    RPR, Rfx Qn RPR / Confirm TP       Neuro    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  Continue current medication  Follow up with neurology     Relevant Orders    CBC & Differential    Confusion  Likely multifactorial -acute illness, medications, previous stroke etc.  Patient is presently alert, fully orientated, and able to provide a fairly detailed history of his recent hospitalization  Will continue to monitor    Lumbosacral radiculopathy at S1  Patient would like to undergo a pain management assessment for this his knee pain and his restless leg syndrome    Relevant Orders    Ambulatory Referral to Pain Management Clinic    Multiple lacunar infarcts    Relevant Orders    CBC & Differential    RLS (restless legs syndrome)  As above.  Patient reminded that ropinirole is likely augmenting his symptoms.  Will begin a another taper  Will continue current dose of pregabalin    Relevant Orders    Ambulatory Referral to Pain Management Clinic       Pulmonary and Pneumonias    Mixed type COPD (chronic obstructive pulmonary disease)    Relevant Orders    CBC & Differential    Pneumonia of both lower lobes due to infectious organism  Advised to fill the prescription waiting at the pharmacy for cefdinir  Encouraged to report if any worse or if any new symptoms or concerns.  Will see back in 3 to 4 weeks time sooner if any concerns    Relevant Orders    CBC & Differential    Sedimentation Rate    C-reactive Protein        Skin    Pruritus       Tobacco    Smoker          Diagnoses         Codes Comments    Renal artery stenosis    -  Primary ICD-10-CM: I70.1  ICD-9-CM: 440.1     History of right-sided carotid endarterectomy     ICD-10-CM: Z98.890  ICD-9-CM: V45.89     Mixed hyperlipidemia     ICD-10-CM: E78.2  ICD-9-CM: 272.2     Essential hypertension     ICD-10-CM: I10  ICD-9-CM: 401.9     Coronary artery calcification seen on CT scan     ICD-10-CM: I25.10  ICD-9-CM: 414.00     Atherosclerosis of aorta     ICD-10-CM: I70.0  ICD-9-CM: 440.0     Type 2 diabetes mellitus with hyperglycemia, with long-term current use of insulin     ICD-10-CM: E11.65, Z79.4  ICD-9-CM: 250.00, 790.29, V58.67     Gastroesophageal reflux disease without esophagitis     ICD-10-CM: K21.9  ICD-9-CM: 530.81     History of colonic polyps     ICD-10-CM: Z86.0100  ICD-9-CM: V12.72     BPH without obstruction/lower urinary tract symptoms     ICD-10-CM: N40.0  ICD-9-CM: 600.00     Healthcare maintenance     ICD-10-CM: Z00.00  ICD-9-CM: V70.0     Iron deficiency anemia due to chronic blood loss     ICD-10-CM: D50.0  ICD-9-CM: 280.0     Anxiety associated with depression     ICD-10-CM: F41.8  ICD-9-CM: 300.4     RLS (restless legs syndrome)     ICD-10-CM: G25.81  ICD-9-CM: 333.94     Multiple lacunar infarcts     ICD-10-CM: I63.81  ICD-9-CM: 434.91     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     ICD-10-CM: I63.421  ICD-9-CM: 434.11     Mixed type COPD (chronic obstructive pulmonary disease)     ICD-10-CM: J44.9  ICD-9-CM: 496     Smoker     ICD-10-CM: F17.200  ICD-9-CM: 305.1     Pruritus     ICD-10-CM: L29.9  ICD-9-CM: 698.9     Pneumonia of both lower lobes due to infectious organism     ICD-10-CM: J18.9  ICD-9-CM: 486     Confusion     ICD-10-CM: R41.0  ICD-9-CM: 298.9     Lumbosacral radiculopathy at S1     ICD-10-CM: M54.17  ICD-9-CM: 724.4     Epistaxis     ICD-10-CM: R04.0  ICD-9-CM: 784.7     Type 2 diabetes mellitus without  "complication, without long-term current use of insulin     ICD-10-CM: E11.9  ICD-9-CM: 250.00           I spent 72 minutes caring for Torin \"Marvin\" Darwin on this date of service. This time includes time spent by me in the following activities:reviewing tests, performing a medically appropriate examination and/or evaluation , counseling and educating the patient/family/caregiver, ordering medications, tests, or procedures and documenting information in the medical record              "

## 2024-11-19 ENCOUNTER — TELEPHONE (OUTPATIENT)
Dept: FAMILY MEDICINE CLINIC | Facility: CLINIC | Age: 79
End: 2024-11-19

## 2024-11-19 NOTE — TELEPHONE ENCOUNTER
Caller: Rik Granados    Relationship: Emergency Contact    Best call back number: [phone]    What was the call regarding: PATIENT'S WIFE CALLED TO CHECK THE STATUS OF A LETTER OF RECOMMENDATION FOR THE PATIENT TO HAVE A  PSYCH EVALUATION

## 2024-11-26 ENCOUNTER — TELEPHONE (OUTPATIENT)
Dept: FAMILY MEDICINE CLINIC | Facility: CLINIC | Age: 79
End: 2024-11-26

## 2024-11-26 NOTE — TELEPHONE ENCOUNTER
Caller: [employer]    Relationship: Other    Best call back number: [phone]    What is the best time to reach you: ANYTIME, OK TO LEAVE VOICEMAIL.    Who are you requesting to speak with (clinical staff, provider,  specific staff member): CLINICAL STAFF    Do you know the name of the person who called: GRAYSON    What was the call regarding: GRAYSON IS CALLING TO GET CLARIFICATION ON TRULICITY AND ATORVASTATIN. GRAYSON WOULD LIKE TO KNOW IF PATIENT IS STILL SUPPOSED TO TAKE THESE MEDICATIONS AND HOW. PLEASE ADVISE.    Is it okay if the provider responds through MyChart: NO CALL ONLY

## 2024-12-03 ENCOUNTER — OFFICE VISIT (OUTPATIENT)
Dept: FAMILY MEDICINE CLINIC | Facility: CLINIC | Age: 79
End: 2024-12-03
Payer: MEDICARE

## 2024-12-03 DIAGNOSIS — N40.0 BPH WITHOUT OBSTRUCTION/LOWER URINARY TRACT SYMPTOMS: ICD-10-CM

## 2024-12-03 DIAGNOSIS — I70.1 RENAL ARTERY STENOSIS: ICD-10-CM

## 2024-12-03 DIAGNOSIS — R41.0 CONFUSION: ICD-10-CM

## 2024-12-03 DIAGNOSIS — J44.9 MIXED TYPE COPD (CHRONIC OBSTRUCTIVE PULMONARY DISEASE): ICD-10-CM

## 2024-12-03 DIAGNOSIS — D50.0 IRON DEFICIENCY ANEMIA DUE TO CHRONIC BLOOD LOSS: ICD-10-CM

## 2024-12-03 DIAGNOSIS — J30.9 CHRONIC ALLERGIC RHINITIS: Primary | ICD-10-CM

## 2024-12-03 DIAGNOSIS — I10 ESSENTIAL HYPERTENSION: ICD-10-CM

## 2024-12-03 DIAGNOSIS — Z98.890 HISTORY OF RIGHT-SIDED CAROTID ENDARTERECTOMY: ICD-10-CM

## 2024-12-03 DIAGNOSIS — M25.511 ACUTE PAIN OF RIGHT SHOULDER: ICD-10-CM

## 2024-12-03 DIAGNOSIS — J18.9 PNEUMONIA OF BOTH LOWER LOBES DUE TO INFECTIOUS ORGANISM: ICD-10-CM

## 2024-12-03 DIAGNOSIS — G25.81 RLS (RESTLESS LEGS SYNDROME): ICD-10-CM

## 2024-12-03 DIAGNOSIS — J98.8 RESPIRATORY TRACT INFECTION: ICD-10-CM

## 2024-12-03 DIAGNOSIS — E55.9 VITAMIN D DEFICIENCY: ICD-10-CM

## 2024-12-03 DIAGNOSIS — M54.59 MECHANICAL LOW BACK PAIN: ICD-10-CM

## 2024-12-03 DIAGNOSIS — I70.0 ATHEROSCLEROSIS OF AORTA: ICD-10-CM

## 2024-12-03 DIAGNOSIS — G89.29 CHRONIC PAIN OF LEFT KNEE: ICD-10-CM

## 2024-12-03 DIAGNOSIS — K21.9 GASTROESOPHAGEAL REFLUX DISEASE WITHOUT ESOPHAGITIS: ICD-10-CM

## 2024-12-03 DIAGNOSIS — L29.9 PRURITUS: ICD-10-CM

## 2024-12-03 DIAGNOSIS — M25.562 CHRONIC PAIN OF LEFT KNEE: ICD-10-CM

## 2024-12-03 DIAGNOSIS — Z00.00 HEALTHCARE MAINTENANCE: ICD-10-CM

## 2024-12-03 DIAGNOSIS — Z79.4 TYPE 2 DIABETES MELLITUS WITH HYPERGLYCEMIA, WITH LONG-TERM CURRENT USE OF INSULIN: ICD-10-CM

## 2024-12-03 DIAGNOSIS — I63.81 MULTIPLE LACUNAR INFARCTS: ICD-10-CM

## 2024-12-03 DIAGNOSIS — F17.200 SMOKER: ICD-10-CM

## 2024-12-03 DIAGNOSIS — E11.65 TYPE 2 DIABETES MELLITUS WITH HYPERGLYCEMIA, WITH LONG-TERM CURRENT USE OF INSULIN: ICD-10-CM

## 2024-12-03 DIAGNOSIS — I25.10 CORONARY ARTERY CALCIFICATION SEEN ON CT SCAN: ICD-10-CM

## 2024-12-03 DIAGNOSIS — I63.421 CEREBRAL INFARCTION DUE TO EMBOLISM OF RIGHT ANTERIOR CEREBRAL ARTERY: ICD-10-CM

## 2024-12-03 DIAGNOSIS — E78.2 MIXED HYPERLIPIDEMIA: ICD-10-CM

## 2024-12-03 PROCEDURE — 1125F AMNT PAIN NOTED PAIN PRSNT: CPT | Performed by: GENERAL PRACTICE

## 2024-12-03 PROCEDURE — 3078F DIAST BP <80 MM HG: CPT | Performed by: GENERAL PRACTICE

## 2024-12-03 PROCEDURE — 3074F SYST BP LT 130 MM HG: CPT | Performed by: GENERAL PRACTICE

## 2024-12-03 PROCEDURE — 99214 OFFICE O/P EST MOD 30 MIN: CPT | Performed by: GENERAL PRACTICE

## 2024-12-03 PROCEDURE — G2211 COMPLEX E/M VISIT ADD ON: HCPCS | Performed by: GENERAL PRACTICE

## 2024-12-03 RX ORDER — AZITHROMYCIN 250 MG/1
TABLET, FILM COATED ORAL
Qty: 9 TABLET | Refills: 0 | Status: SHIPPED | OUTPATIENT
Start: 2024-12-03 | End: 2024-12-06

## 2024-12-03 NOTE — PROGRESS NOTES
Subjective   Torin Granados is a 79 y.o. male.     Chief Complaint  Cough    History of Present Illness     Cough  He has experienced an increased cough productive of purulent sputum since last here.  He admits to marked nasal congestion with small amounts of blood when he blows his nose, shortness of breath on exertion, generalized weakness and fatigue.  He denies any chest pain or hemoptysis, and has been eating well with no nausea, vomiting, or diarrhea.  He denies any dysuria or hematuria, there is no history of any fever or chills.    Anemia  He left AMA from [facility] on 11/15/2024 following a 3 day stay for persistent nosebleeds, severe anemia, possible pneumonia, and intermittent confusion.  He was transfused 2 units of packed red blood cells while in hospital.  Hemoglobin on discharge was 7.9.  He was asked to stop ASA and continue clopidogrel alone.  He has yet to undergo the labs arranged at his follow-up visit    Type 2 Diabetes Mellitus  He admits to numbness and tingling of the feet.  He denies any recent hypoglycemia.  He has been administering 10 units of insulin degludec-liraglutide daily as directed.  He continues to deny any visual disturbances, polydipsia, polyuria, or foot ulcerations.  He remains on empagliflozin as well.      Hyperlipidemia  He remains on atorvastatin and ezetimibe with no apparent side effects    Essential Hypertension  He he admits to shortness of breath.  He continues to deny any chest pain, palpitations, orthopnea, paroxysmal nocturnal dyspnea or peripheral edema.  He remains on lisinopril and amlodipine.    Right Frontal Cerebral Infarcts  He was admitted to [facility] on 10/28/2022 after a sudden episode of generalized weakness associated with collapse and apparent involuntary movements of both arms and legs.  He did not lose consciousness and recalls the entire experience.  Noncontrast CT of the brain on admission was reported as showing moderate atrophy and a an old  lacunar infarct of the anterior limb of the right internal capsule.  CTA of the brain and carotids was reported as showing a 95% stenosis of the proximal right ICA, a less than 50% stenosis of the proximal left ICA, and changes consistent with advanced centrilobular emphysema.  He underwent a right carotid endarterectomy on 11/3/2022.  MRI of the brain performed on 11/4/2022 revealed moderate atrophy, chronic microvascular changes, and old right basal ganglia and periventricular lacunar infarcts, and small acute infarcts of the right frontal cortex and subcortical white matter.  Echocardiogram done while in hospital was reported as showing sclerotic aortic valve leaflets but no other significant normalities with an estimated EF of 65%.  Ultrasound of the renal arteries was reported as showing a more than 60% stenosis on the left and a nonsignificant stenosis on the right.  He remains on clopidogrel and low-dose ASA along with levetiracetam. Unfortunately he continues to smoke and dip tobacco. He continues to deny any unilateral weakness, numbness, or tingling and there is no history of any loss of vision in either eye or side nor any difficulty talking or understanding what is said to him.  Updated ultrasound of the carotid arteries performed on 5/22/2023 revealed mild plaque but no significant stenosis.  CTA of the head and neck performed during his recent hospitalization was reported as showing mild plaque of the left ICA with an approximate 50% stenosis.  He is scheduled to undergo a stroke neurology reassessment with ISAIAS Bowden on 12/11/2024    Restless Leg Syndrome  He has a long history of intermittent lower extremity discomfort.  He describes this as a tight ache centered about his knees.  Episodes occur primarily at night.  He admits to occasional knee stiffness but denies any swelling.  He remains on ropinirole 2 3 times daily and pregabalin 100 4 times daily but feels that he needs a higher dose  of each.      Right Shoulder Pain  He has had intermittent right shoulder pain since his return home from hospital.  He denies any specific strain or trauma.  He describes the pain as a sharp discomfort with use.  The pain does not radiate elsewhere, and has been unassociated with any other symptoms.  There is no history of any stiffness or swelling, and he denies any changes in his strength or sensation.  He is right-hand dominant and gives no history of previous    Left Knee Pain  He has a long history of intermittent left knee pain associated with intermittent posterior swelling. There is no history of any stiffness and he continues to deny any giving way or locking.  He gives a history of several implant procedures on his left knee 1 of which was apparently an ACL repair. He is right-hand dominant.  Plain films of the knee performed on 4/28/2023 were reported as showing hardware associated with a previous ACL repair along with joint space narrowing and chondrocalcinosis worse medially.  Ultrasound of the left knee performed on 6/22/2023 was reported as showing a small joint effusion and posterior cystic fluid consistent with a popliteal cyst    BPH  He underwent a TURP greenlight by Dr. Barraza on 6/2/2022.  His course was uncomplicated and his bladder catheter removed 4 days afterward.  He complains of frequency, nocturia, and a sense of incomplete voiding with occasional dysuria.  He denies any hematuria.  He is no longer followed by urology    Iron Deficiency Anemia  He underwent an updated colonoscopy on 8/17/2022 with removal of 7 tubular adenomatous polyps.  He was advised to have his next study in 3 years.  He underwent an EGD by Dr. Navarro on 8/15/2023 with evidence of iron pill gastritis. He remains on a fairly high OTC dose of iron and his level on admission to hospital was 160 with a ferritin of 457    The following portions of the patient's history were reviewed and updated as appropriate: allergies,  current medications, past medical history, past social history, and problem list.    Review of Systems   Constitutional:  Positive for fatigue. Negative for chills, fever and unexpected weight loss.   HENT:  Positive for congestion, postnasal drip, rhinorrhea and sneezing. Negative for hearing loss, sinus pressure, sore throat and voice change.    Eyes:  Negative for visual disturbance.   Respiratory:  Positive for cough and shortness of breath. Negative for wheezing.    Cardiovascular:  Negative for chest pain, palpitations and leg swelling.   Gastrointestinal:  Negative for abdominal pain, blood in stool, constipation, diarrhea, nausea, vomiting and GERD.   Endocrine: Negative for polydipsia and polyuria.   Genitourinary:  Positive for frequency and nocturia (1-2). Negative for dysuria, hematuria and urgency.   Musculoskeletal:  Positive for arthralgias and back pain. Negative for joint swelling, myalgias and neck pain.   Skin:  Negative for rash.   Neurological:  Positive for tremors, weakness (generalized) and numbness (feet). Negative for speech difficulty and confusion.   Psychiatric/Behavioral:  Positive for decreased concentration. Negative for sleep disturbance and depressed mood. The patient is nervous/anxious.      Objective   Physical Exam  Constitutional:       General: He is not in acute distress.     Appearance: Normal appearance. He is well-developed. He is not ill-appearing or diaphoretic.      Comments: Alert and oriented.  Appeared somewhat disheveled.  Calm, cooperative, and in no apparent distress.  Coryza with intermittent cough.  No pallor, jaundice, diaphoresis, or cyanosis.   HENT:      Head: Atraumatic.      Right Ear: Tympanic membrane, ear canal and external ear normal.      Left Ear: Tympanic membrane, ear canal and external ear normal.      Mouth/Throat:      Lips: No lesions.      Mouth: Mucous membranes are moist. No oral lesions.      Pharynx: No oropharyngeal exudate or posterior  oropharyngeal erythema.   Eyes:      General: Lids are normal. Gaze aligned appropriately.      Extraocular Movements: Extraocular movements intact.      Conjunctiva/sclera: Conjunctivae normal.      Pupils: Pupils are equal.   Neck:      Thyroid: No thyroid mass or thyromegaly.      Vascular: No carotid bruit or JVD.      Trachea: Trachea normal. No tracheal deviation.   Cardiovascular:      Rate and Rhythm: Normal rate and regular rhythm.      Heart sounds: Normal heart sounds, S1 normal and S2 normal. No murmur heard.     No gallop. No S3 or S4 sounds.   Pulmonary:      Effort: Pulmonary effort is normal.      Breath sounds: Wheezing (diffuse - mild) present.      Comments: Pulmonary hyperinflation  Abdominal:      General: Bowel sounds are normal. There is no distension or abdominal bruit.      Palpations: Abdomen is soft. There is no hepatomegaly, splenomegaly or mass.      Tenderness: There is no abdominal tenderness.      Hernia: No hernia is present.   Musculoskeletal:      Right shoulder: No swelling, deformity, tenderness or crepitus. Decreased range of motion (abduction and flexion limited to 90 degrees, normal rotation).      Right lower leg: No edema.      Left lower leg: No edema.      Comments: No peripheral joint redness or warmth.   Lymphadenopathy:      Head:      Right side of head: No submental, submandibular, tonsillar, preauricular, posterior auricular or occipital adenopathy.      Left side of head: No submental, submandibular, tonsillar, preauricular, posterior auricular or occipital adenopathy.      Cervical: No cervical adenopathy.      Upper Body:      Right upper body: No supraclavicular adenopathy.      Left upper body: No supraclavicular adenopathy.   Skin:     General: Skin is warm and dry.      Coloration: Skin is not cyanotic, jaundiced or pale.      Findings: No lesion or rash.      Nails: There is no clubbing.   Neurological:      Mental Status: He is alert and oriented to person,  place, and time.      Cranial Nerves: No cranial nerve deficit, dysarthria or facial asymmetry.      Sensory: Sensory deficit (decreased vibration sense toes of both feet) present.      Motor: No tremor.      Coordination: Coordination normal.      Gait: Gait normal.   Psychiatric:         Attention and Perception: Attention normal.         Mood and Affect: Mood normal.         Speech: Speech normal.         Behavior: Behavior normal.         Thought Content: Thought content normal.       Assessment & Plan   Problems Addressed this Visit          Allergies and Adverse Reactions    Chronic allergic rhinitis       Cardiac and Vasculature    Atherosclerosis of aorta  Reminded regarding risk factor modification with an emphasis on tobacco cessation.  Continue clopidogrel.    Coronary artery calcification seen on CT scan  As above.    Essential hypertension   Hypertension: at goal.   Encouraged to continue to work on diet and exercise plan.   Continue current medication    History of right-sided carotid endarterectomy  As above.    Mixed hyperlipidemia  As above.   Continue current medication.    Renal artery stenosis       Endocrine and Metabolic    Type 2 diabetes mellitus with hyperglycemia, with long-term current use of insulin  Diabetes mellitus Type II, under better control.   Encouraged to continue to pursue ADA diet  Encouraged aerobic exercise.  Continue current medication    Vitamin D deficiency       Gastrointestinal Abdominal     Gastroesophageal reflux disease without esophagitis       Genitourinary and Reproductive     BPH without obstruction/lower urinary tract symptoms       Health Encounters    Healthcare maintenance  Patient is up-to-date on all recommended immunizations       Hematology and Neoplasia    Iron deficiency anemia due to chronic blood loss  Encouraged to follow-up with his scheduled labs.  Patient agrees to have this done tomorrow       Musculoskeletal and Injuries    Acute pain of right  shoulder  Advised regarding rest, gentle exercise, ice and heat.  Patient will also have views of the right shoulder done tomorrow.  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    Confusion    Multiple lacunar infarcts  As above.    RLS (restless legs syndrome)       Pulmonary and Pneumonias    Mixed type COPD (chronic obstructive pulmonary disease)  Reminded of the importance of smoking cessation    Relevant Orders    XR Chest 2 View    RESOLVED: Pneumonia of both lower lobes due to infectious organism    Relevant Medications    amoxicillin-clavulanate (AUGMENTIN) 875-125 MG per tablet    azithromycin (ZITHROMAX) 250 MG tablet    Respiratory tract infection  Advised regarding symptomatic treatment  Patient will also have a chest x-ray done tomorrow.  While awaiting the results, will start on empiric amoxicillin/clavulanate and azithromycin  Encouraged to seek immediate assessment here or in the ER if any worse or if any new symptoms    Relevant Medications    amoxicillin-clavulanate (AUGMENTIN) 875-125 MG per tablet    azithromycin (ZITHROMAX) 250 MG tablet    Other Relevant Orders    COVID-19 and FLU A/B PCR, 1 HR TAT - Swab, Nasopharynx (Completed)    XR Chest 2 View       Skin    Pruritus       Tobacco    Smoker    Relevant Orders    XR Chest 2 View     Diagnoses         Codes Comments    Chronic allergic rhinitis    -  Primary ICD-10-CM: J30.9  ICD-9-CM: 477.9     Renal artery stenosis     ICD-10-CM: I70.1  ICD-9-CM: 440.1     Mixed hyperlipidemia     ICD-10-CM: E78.2  ICD-9-CM: 272.2     History of right-sided carotid endarterectomy     ICD-10-CM: Z98.890  ICD-9-CM: V45.89     Essential hypertension     ICD-10-CM: I10  ICD-9-CM: 401.9     Coronary artery calcification seen on CT scan      ICD-10-CM: I25.10  ICD-9-CM: 414.00     Atherosclerosis of aorta     ICD-10-CM: I70.0  ICD-9-CM: 440.0     Vitamin D deficiency     ICD-10-CM: E55.9  ICD-9-CM: 268.9     Type 2 diabetes mellitus with hyperglycemia, with long-term current use of insulin     ICD-10-CM: E11.65, Z79.4  ICD-9-CM: 250.00, 790.29, V58.67     Gastroesophageal reflux disease without esophagitis     ICD-10-CM: K21.9  ICD-9-CM: 530.81     BPH without obstruction/lower urinary tract symptoms     ICD-10-CM: N40.0  ICD-9-CM: 600.00     Healthcare maintenance     ICD-10-CM: Z00.00  ICD-9-CM: V70.0     Iron deficiency anemia due to chronic blood loss     ICD-10-CM: D50.0  ICD-9-CM: 280.0     Mechanical low back pain     ICD-10-CM: M54.59  ICD-9-CM: 724.2     Chronic pain of left knee     ICD-10-CM: M25.562, G89.29  ICD-9-CM: 719.46, 338.29     RLS (restless legs syndrome)     ICD-10-CM: G25.81  ICD-9-CM: 333.94     Multiple lacunar infarcts     ICD-10-CM: I63.81  ICD-9-CM: 434.91     Confusion     ICD-10-CM: R41.0  ICD-9-CM: 298.9     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

## 2024-12-04 VITALS
HEIGHT: 67 IN | WEIGHT: 125 LBS | TEMPERATURE: 98.2 F | SYSTOLIC BLOOD PRESSURE: 122 MMHG | HEART RATE: 97 BPM | OXYGEN SATURATION: 96 % | DIASTOLIC BLOOD PRESSURE: 62 MMHG | RESPIRATION RATE: 15 BRPM | BODY MASS INDEX: 19.62 KG/M2

## 2024-12-04 PROBLEM — J18.9 PNEUMONIA OF BOTH LOWER LOBES DUE TO INFECTIOUS ORGANISM: Status: RESOLVED | Noted: 2024-11-18 | Resolved: 2024-12-04

## 2024-12-04 LAB
FLUAV RNA RESP QL NAA+PROBE: NOT DETECTED
FLUBV RNA ISLT QL NAA+PROBE: NOT DETECTED
SARS-COV-2 RNA RESP QL NAA+PROBE: NOT DETECTED

## 2024-12-04 PROCEDURE — 87636 SARSCOV2 & INF A&B AMP PRB: CPT | Performed by: GENERAL PRACTICE

## 2024-12-05 ENCOUNTER — HOSPITAL ENCOUNTER (OUTPATIENT)
Facility: HOSPITAL | Age: 79
Discharge: HOME | End: 2024-12-05
Admitting: GENERAL PRACTICE
Payer: MEDICARE
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
## 2024-12-05 DIAGNOSIS — J44.9 MIXED TYPE COPD (CHRONIC OBSTRUCTIVE PULMONARY DISEASE): ICD-10-CM

## 2024-12-05 DIAGNOSIS — M25.532 LEFT WRIST PAIN: ICD-10-CM

## 2024-12-05 DIAGNOSIS — F17.200 SMOKER: ICD-10-CM

## 2024-12-05 DIAGNOSIS — J98.8 RESPIRATORY TRACT INFECTION: ICD-10-CM
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
## 2024-12-05 DIAGNOSIS — M25.511 ACUTE PAIN OF RIGHT SHOULDER: ICD-10-CM

## 2024-12-05 PROCEDURE — 71046 X-RAY EXAM CHEST 2 VIEWS: CPT

## 2024-12-05 PROCEDURE — 73030 X-RAY EXAM OF SHOULDER: CPT

## 2024-12-05 PROCEDURE — 73030 X-RAY EXAM OF SHOULDER: CPT | Performed by: RADIOLOGY

## 2024-12-05 PROCEDURE — 73110 X-RAY EXAM OF WRIST: CPT

## 2024-12-06 DIAGNOSIS — S52.532A CLOSED COLLES' FRACTURE OF LEFT RADIUS, INITIAL ENCOUNTER: ICD-10-CM

## 2024-12-06 DIAGNOSIS — J98.8 RESPIRATORY TRACT INFECTION: Primary | ICD-10-CM

## 2024-12-06 PROBLEM — S52.539A COLLES' FRACTURE: Status: ACTIVE | Noted: 2024-12-06

## 2024-12-06 PROBLEM — S52.539A COLLES' FRACTURE: Status: RESOLVED | Noted: 2024-12-06 | Resolved: 2024-12-06

## 2024-12-06 RX ORDER — LEVOFLOXACIN 500 MG/1
500 TABLET, FILM COATED ORAL DAILY
Qty: 7 TABLET | Refills: 0 | Status: SHIPPED | OUTPATIENT
Start: 2024-12-06 | End: 2024-12-13

## (undated) DEVICE — LUBE JELLY PK/2.75GM STRL BX/144

## (undated) DEVICE — CONMED SCOPE SAVER BITE BLOCK, 20X27 MM: Brand: SCOPE SAVER

## (undated) DEVICE — CATHETER,FOLEY,100%SILICONE,20FR,10ML,LF: Brand: MEDLINE

## (undated) DEVICE — FIBR LASR MOXY XPS GREENLIGHT

## (undated) DEVICE — VLV SXN AIR/H2O ORCAPOD3 1P/U STRL

## (undated) DEVICE — Device

## (undated) DEVICE — COR CYSTO: Brand: MEDLINE INDUSTRIES, INC.

## (undated) DEVICE — Device: Brand: DEFENDO AIR/WATER/SUCTION AND BIOPSY VALVE

## (undated) DEVICE — FRCP BX RADJAW4 NDL 2.8 240 STD OG

## (undated) DEVICE — ST INF PRI SMRTSTE 20DRP 2VLV 24ML 117

## (undated) DEVICE — ENDOSCOPY PORT CONNECTOR FOR OLYMPUS® SCOPES: Brand: ERBE

## (undated) DEVICE — HYBRID TUBING/CAP SET FOR OLYMPUS® SCOPES: Brand: ERBE

## (undated) DEVICE — SINGLE PORT MANIFOLD: Brand: NEPTUNE 2

## (undated) DEVICE — SUCTION CANISTER, 1500CC, RIGID: Brand: DEROYAL

## (undated) DEVICE — SYR LUER SLPTP 50ML

## (undated) DEVICE — Device: Brand: ENDOGATOR

## (undated) DEVICE — GOWN,REINF,POLY,ECL,PP SLV,XXL: Brand: MEDLINE

## (undated) DEVICE — CONN Y IRR DISP 1P/U

## (undated) DEVICE — SYR LUERLOK 30CC

## (undated) DEVICE — GLV SURG TRIUMPH MICRO PF LTX 8 STRL

## (undated) DEVICE — MEDI-VAC NON-CONDUCTIVE SUCTION TUBING: Brand: CARDINAL HEALTH

## (undated) DEVICE — DEFOGGER!" ANTI FOG KIT: Brand: DEROYAL

## (undated) DEVICE — Y-TYPE TUR/BLADDER IRRIGATION SET, REGULATING CLAMP

## (undated) DEVICE — DRAINBAG,ANTI-REFLUX TOWER,L/F,2000ML,LL: Brand: MEDLINE